# Patient Record
Sex: MALE | Race: WHITE | NOT HISPANIC OR LATINO | Employment: FULL TIME | URBAN - NONMETROPOLITAN AREA
[De-identification: names, ages, dates, MRNs, and addresses within clinical notes are randomized per-mention and may not be internally consistent; named-entity substitution may affect disease eponyms.]

---

## 2017-10-09 ENCOUNTER — OFFICE VISIT (OUTPATIENT)
Dept: FAMILY MEDICINE | Facility: OTHER | Age: 22
End: 2017-10-09
Attending: FAMILY MEDICINE

## 2017-10-09 VITALS
HEART RATE: 88 BPM | SYSTOLIC BLOOD PRESSURE: 108 MMHG | HEIGHT: 66 IN | DIASTOLIC BLOOD PRESSURE: 62 MMHG | BODY MASS INDEX: 21.38 KG/M2 | TEMPERATURE: 99.2 F | WEIGHT: 133 LBS

## 2017-10-09 DIAGNOSIS — J01.01 ACUTE RECURRENT MAXILLARY SINUSITIS: Primary | ICD-10-CM

## 2017-10-09 PROCEDURE — 99213 OFFICE O/P EST LOW 20 MIN: CPT | Performed by: FAMILY MEDICINE

## 2017-10-09 RX ORDER — METHYLPREDNISOLONE 4 MG
TABLET, DOSE PACK ORAL
Qty: 21 TABLET | Refills: 0 | Status: SHIPPED | OUTPATIENT
Start: 2017-10-09 | End: 2018-11-21

## 2017-10-09 ASSESSMENT — ANXIETY QUESTIONNAIRES
1. FEELING NERVOUS, ANXIOUS, OR ON EDGE: NOT AT ALL
4. TROUBLE RELAXING: NOT AT ALL
6. BECOMING EASILY ANNOYED OR IRRITABLE: NOT AT ALL
5. BEING SO RESTLESS THAT IT IS HARD TO SIT STILL: NOT AT ALL
2. NOT BEING ABLE TO STOP OR CONTROL WORRYING: NOT AT ALL
7. FEELING AFRAID AS IF SOMETHING AWFUL MIGHT HAPPEN: NOT AT ALL
3. WORRYING TOO MUCH ABOUT DIFFERENT THINGS: NOT AT ALL
GAD7 TOTAL SCORE: 0

## 2017-10-09 ASSESSMENT — PATIENT HEALTH QUESTIONNAIRE - PHQ9: SUM OF ALL RESPONSES TO PHQ QUESTIONS 1-9: 0

## 2017-10-09 ASSESSMENT — PAIN SCALES - GENERAL: PAINLEVEL: MILD PAIN (3)

## 2017-10-09 NOTE — NURSING NOTE
"Chief Complaint   Patient presents with     Sinus Problem       Initial /62  Pulse 88  Temp 99.2  F (37.3  C)  Ht 5' 6\" (1.676 m)  Wt 133 lb (60.3 kg)  BMI 21.47 kg/m2 Estimated body mass index is 21.47 kg/(m^2) as calculated from the following:    Height as of this encounter: 5' 6\" (1.676 m).    Weight as of this encounter: 133 lb (60.3 kg).  Medication Reconciliation: complete     Song Odom      "

## 2017-10-09 NOTE — MR AVS SNAPSHOT
"              After Visit Summary   10/9/2017    Aaron Conner    MRN: [id number]           Patient Information     Date Of Birth          1995        Visit Information        Provider Department      10/9/2017 2:20 PM Osmel Salas MD [facility]        Today's Diagnoses     Acute recurrent maxillary sinusitis    -  1       Follow-ups after your visit        Who to contact     If you have questions or need follow up information about today's clinic visit or your schedule please contact [facility] directly at [phone].  Normal or non-critical lab and imaging results will be communicated to you by MyChart, letter or phone within 4 business days after the clinic has received the results. If you do not hear from us within 7 days, please contact the clinic through MyChart or phone. If you have a critical or abnormal lab result, we will notify you by phone as soon as possible.  Submit refill requests through [employer] or call your pharmacy and they will forward the refill request to us. Please allow 3 business days for your refill to be completed.          Additional Information About Your Visit        MyChart Information     [employer] lets you send messages to your doctor, view your test results, renew your prescriptions, schedule appointments and more. To sign up, go to [email or web address] . Click on \"Log in\" on the left side of the screen, which will take you to the Welcome page. Then click on \"Sign up Now\" on the right side of the page.     You will be asked to enter the access code listed below, as well as some personal information. Please follow the directions to create your username and password.     Your access code is: [email or web address]  Expires: 2018  2:48 PM     Your access code will  in 90 days. If you need help or a new code, please call your [facility] or [phone].        Care EveryWhere ID     This is your Care EveryWhere ID. This could be used by other " "organizations to access your [place] medical records  [id number]        Your Vitals Were     Pulse Temperature Height BMI (Body Mass Index)          88 99.2  F (37.3  C) 5' 6\" (1.676 m) 21.47 kg/m2         Blood Pressure from Last 3 Encounters:   10/09/17 108/62   10/18/16 118/70   11/16/15 121/61    Weight from Last 3 Encounters:   10/09/17 133 lb (60.3 kg)   10/18/16 131 lb (59.4 kg)   11/16/15 130 lb (59 kg)              Today, you had the following     No orders found for display         Today's Medication Changes          These changes are accurate as of: 10/9/17  2:48 PM.  If you have any questions, ask your nurse or doctor.               Start taking these medicines.        Dose/Directions    amoxicillin-clavulanate 875-125 MG per tablet   Commonly known as:  AUGMENTIN   Used for:  Acute recurrent maxillary sinusitis   Started by:  Osmel Salas MD        Dose:  1 tablet   Take 1 tablet by mouth 2 times daily   Quantity:  20 tablet   Refills:  0         Stop taking these medicines if you haven't already. Please contact your care team if you have questions.     cefPROZIL 500 MG tablet   Commonly known as:  CEFZIL   Stopped by:  Osmel Salas MD                Where to get your medicines      These medications were sent to [address] [address]     Phone:  [phone]     amoxicillin-clavulanate 875-125 MG per tablet    methylPREDNISolone 4 MG tablet                Primary Care Provider Office Phone # Fax #    Osmel Salas [phone]       [facility] [address]  [place] [address]        Equal Access to Services     [facility] TITO AH: Hadii shi Zhou, waaxda luqadaha, qaybta kaalmada adevaldezyada, jean benitez. So [facility] [phone].    ATENCIÓN: Si habla español, tiene a mcgarry disposición servicios gratuitos de asistencia lingüística. Llame al [phone].    We " comply with applicable federal civil rights laws and [place] laws. We do not discriminate on the basis of race, color, national origin, age, disability, sex, sexual orientation, or gender identity.            Thank you!     Thank you for [facility]  for your [facility]. Our goal is always to provide you with excellent care. Hearing back from our patients is one way we can continue to improve our services. Please take a few minutes to complete the written survey that you may receive in the mail after your visit with us. Thank you!             Your Updated Medication List - Protect others around you: Learn how to safely use, store and throw away your medicines at www.disposemymeds.org.          This list is accurate as of: 10/9/17  2:48 PM.  Always use your most recent med list.                   Brand Name Dispense Instructions for use Diagnosis    amoxicillin-clavulanate 875-125 MG per tablet    AUGMENTIN    20 tablet    Take 1 tablet by mouth 2 times daily    Acute recurrent maxillary sinusitis       methylPREDNISolone 4 MG tablet    MEDROL DOSEPAK    21 tablet    Follow package instructions    Acute recurrent maxillary sinusitis

## 2017-10-09 NOTE — PROGRESS NOTES
"  SUBJECTIVE:   Aaron Conner is a 21 year old male who presents to clinic today for the following health issues:      RESPIRATORY SYMPTOMS      Duration: couple weeks    Description  nasal congestion, sore throat, facial pain/pressure, cough, chills, ear pain left, headache, fatigue/malaise, hoarse voice, myalgias and conjunctival irritation    Severity: moderate    Accompanying signs and symptoms: dizziness    History (predisposing factors):  none    Precipitating or alleviating factors: None    Therapies tried and outcome:  rest and fluids nasal spray/wash - affrin      ROS  GEN: NEGATIVE for fever, weight loss, night sweats, recent travel. POSITIVE for warmth and chills and sick contacts.   HEENT: NEGATIVE for postnasal drip, sore throat. POSITIVE for sinus tenderness, cough,   CV: NEGATIVE for chest pain, palpitations.   RESP: NEGATIVE for SOB, pleuritic pain.   GI: NEGATIVE for diarrhea, constipation           Problem list and histories reviewed & adjusted, as indicated.  Additional history: as documented    Labs reviewed in EPIC    Reviewed and updated as needed this visit by clinical staff     Reviewed and updated as needed this visit by Provider         ROS:  C: NEGATIVE for fever, chills, change in weight  R: NEGATIVE for significant cough or SOB    OBJECTIVE:                                                    /62  Pulse 88  Temp 99.2  F (37.3  C)  Ht 5' 6\" (1.676 m)  Wt 133 lb (60.3 kg)  BMI 21.47 kg/m2  Body mass index is 21.47 kg/(m^2).   GENERAL: healthy, alert, well nourished, well hydrated, no distress  HENT: ear canals- normal; TMs- normal; Nose- normal; Mouth- no ulcers, no lesions  Maxillary sinus tenderness  NECK: no tenderness, no adenopathy, no asymmetry, no masses, no stiffness; thyroid- normal to palpation  RESP: lungs clear to auscultation - no rales, no rhonchi, no wheezes         ASSESSMENT/PLAN:                                                    (J01.01) Acute recurrent " maxillary sinusitis  (primary encounter diagnosis)  Comment: will treat.    Plan: amoxicillin-clavulanate (AUGMENTIN) 875-125 MG         per tablet, methylPREDNISolone (MEDROL DOSEPAK)        4 MG tablet        Symptomatic treatment was discussed along when patient should call and/or come back into the clinic or go to ER/Urgent care. All questions answered.   Symptomatic treatment was discussed along what is available for OTC medications for symptomatic relief.             Osmel Salas MD  [facility]

## 2017-10-10 ENCOUNTER — TELEPHONE (OUTPATIENT)
Dept: FAMILY MEDICINE | Facility: OTHER | Age: 22
End: 2017-10-10

## 2017-10-10 ENCOUNTER — OFFICE VISIT (OUTPATIENT)
Dept: FAMILY MEDICINE | Facility: OTHER | Age: 22
End: 2017-10-10
Attending: FAMILY MEDICINE

## 2017-10-10 VITALS
BODY MASS INDEX: 21.38 KG/M2 | SYSTOLIC BLOOD PRESSURE: 112 MMHG | HEIGHT: 66 IN | WEIGHT: 133 LBS | HEART RATE: 100 BPM | TEMPERATURE: 99.2 F | DIASTOLIC BLOOD PRESSURE: 62 MMHG

## 2017-10-10 DIAGNOSIS — B08.4 HAND, FOOT AND MOUTH DISEASE: Primary | ICD-10-CM

## 2017-10-10 PROCEDURE — 99213 OFFICE O/P EST LOW 20 MIN: CPT | Performed by: FAMILY MEDICINE

## 2017-10-10 ASSESSMENT — PAIN SCALES - GENERAL: PAINLEVEL: MILD PAIN (2)

## 2017-10-10 ASSESSMENT — ANXIETY QUESTIONNAIRES: GAD7 TOTAL SCORE: 0

## 2017-10-10 NOTE — PROGRESS NOTES
SUBJECTIVE:   Aaron Conner is a 21 year old male who presents to clinic today for the following health issues:      Rash      Duration: 1 day    Description  Location: bottom of left foot  Itching: no    Intensity:  moderate    Accompanying signs and symptoms: painfull    History (similar episodes/previous evaluation): None    Precipitating or alleviating factors:  New exposures:  medication augmebtin and prednisone  Recent travel: no      Therapies tried and outcome: none    Throat      Duration: blisters and white spots    Description (location/character/radiation): throat    Intensity:  mild    Accompanying signs and symptoms: fatigue    History (similar episodes/previous evaluation): None    Precipitating or alleviating factors: None    Therapies tried and outcome: None     Hot flashes, cold flashes - feeling more weak and run down.   101 last night   Headache with coughing   Took decongestant this morning and slept better.   Started prednisone this morning, this it was after rash.   Taken 2 doses of Augmentin.     Noticed throat spots this am with blister on left tonsil. Pain 4-5/10 when swallowing, throat feels small but the same as yesterday.     Rash this am, purple circles in center of sole, somewhat painful. No itching. Feels like needles with pressure when moving or walking.   Didn't step on anything he knows.   Nothing like this before. No known allergies.       dtr has simialr sx      Problem list and histories reviewed & adjusted, as indicated.  Additional history: as documented        Reviewed and updated as needed this visit by clinical staffTobacco  Allergies  Meds  Med Hx  Surg Hx  Fam Hx  Soc Hx      Reviewed and updated as needed this visit by Provider       ROS:  CONSTITUTIONAL:POSITIVE for chills, fatigue, fever to 101, malaise, myalgias and sweats  INTEGUMENTARY/SKIN: POSITIVE for rash on bottom of left foot. NEGATIVE for other rashes or sores.   ENT/MOUTH: POSITIVE for hoarseness,  "nasal congestion, sinus pressure and sore throat  RESP:POSITIVE for productive cough. NEGATIVE for SOB or difficulty breathing.   CV: NEGATIVE for chest pain, palpitations or peripheral edema  GI: NEGATIVE for nausea, abdominal pain, heartburn, or change in bowel habits    OBJECTIVE:     /62  Pulse 100  Temp 99.2  F (37.3  C)  Ht 5' 6\" (1.676 m)  Wt 133 lb (60.3 kg)  BMI 21.47 kg/m2  Body mass index is 21.47 kg/(m^2).     GENERAL: mild  ill appearing & coughing, alert and no acute distress  HENT: normal cephalic/atraumatic, ear canals and TM's normal, nose and mouth tonsils with  ulcers  On both - very superficial , oral mucous membranes moist, tonsillar erythema   NECK: right adenopathy, no asymmetry, masses, or scars and thyroid normal to palpation  RESP: lungs clear to auscultation - no rales, rhonchi or wheezes  CV: regular rate and rhythm, normal S1 S2, no S3 or S4, no murmur, click or rub, no peripheral edema and peripheral pulses strong  MS: no gross musculoskeletal defects noted, no edema  SKIN: left foot with purple rash tender to the touch, [id number] in center of sole with some blisters and right foot starting to have some blister     Diagnostic Test Results:  none     ASSESSMENT/PLAN:     (B08.4) Hand, foot and mouth disease  (primary encounter diagnosis)  Comment: discussed and hand out given.  Truly feel this is going on. Probably not so much sinus issue that was discussed yesterday.    Plan: Discussed stopping steroids- may immunosuppress but may also help pain in throat. Stop for now.  Motrin and tylenol.  Keeping or stopping abx discussed. Pt wants to stay on it.  Contagiousness and pain control discussed.  Symptomatic treatment was discussed along when patient should call and/or come back into the clinic or go to ER/Urgent care. All questions answered.         See Patient Instructions    Osmel Salas MD  [facility] [place]  "

## 2017-10-10 NOTE — TELEPHONE ENCOUNTER
Has a bumpy painful rash on bottom of left foot, noticed this morning. Also throat looks worse than yesterday has white bumps in back of it and a puss filled bump.

## 2017-10-10 NOTE — PATIENT INSTRUCTIONS
When Your Child Has Hand, Foot, and Mouth Disease  Hand, foot, and mouth disease (HFMD) is a common viral infection in children. It can cause mouth sores and a painless rash on the hands, feet, or buttocks. HFMD can be easily spread from one person to another. It occurs more often in children younger than 10 years old, but anyone can get it. HFMD is often mistaken for strep throat because the symptoms of both conditions are similar. HFMD can cause some discomfort, but it s not a serious problem. Most cases can easily be managed and treated at home.  What causes hand, foot, and mouth disease?  HFMD is usually caused by the coxsackievirus. It can also be caused by other viruses in the same family as coxsackievirus. Your child may have caught HFMD in one of the following ways:    Breathing infected air (the virus can enter the air when an infected person coughs, sneezes, or talks).    Contact with items contaminated with stool from an infected person. Contamination can occur when an infected person doesn t wash his or her hands after having a bowel movement or changing a diaper.    Contact with fluid from the blisters that are part of the rash (this type of transmission is rare).  What are the symptoms of hand, foot, and mouth disease?  Symptoms usually appear 24 to 72 hours after exposure. They include:    Rash (small, red bumps or blisters on the hands, feet, or buttocks)    Mouth sores that often occur on the gums, tongue, inside the cheeks, and in the back of the throat (mouth sores may not occur in some children)    Sore throat    A nonspecific rash over the rest of the body    Fever    Loss of appetite    Pain when swallowing    Drooling  How is hand, foot, and mouth disease diagnosed?  HFMD is diagnosed by how the rash and mouth sores look. To get more information, the healthcare provider will ask about your child s symptoms and health history. He or she will also examine your child. You will be told if any  tests are needed to rule out other infections.  How is hand, foot, and mouth disease treated?  There is no specific treatment for HFMD, but there are things you can do at home to help relieve some symptoms. The illness generally lasts about 7 to 10 days. Your child is no longer contagious 24 hours after the fever is gone.  Mouth pain    Unless your child s healthcare provider has prescribed another medicine for mouth pain, give your child ibuprofen or acetaminophen to treat pain or discomfort. Talk with your child's provider about dosing instructions and when to give the medicine (schedule). Do not give ibuprofen to an infant age 6 months or younger. Do not give aspirin to a child with a fever. This can put your child at risk of a serious illness called Reye syndrome.    Liquid antacid can be used 4 times per day to coat the mouth sores for pain relief. Talk with your child's provider about how much and when to give the medicine to your child:    Children over age 4 can use 1 teaspoon (5ml) as a mouth rinse after meals.    For children under age 4, a parent can place 1/2 teaspoon (2.5ml) in the front of the mouth after meals. Avoid regular mouth rinses because they may sting.  Diet    Follow a soft diet with plenty of fluids to prevent fluid loss (dehydration). If your child doesn't want to eat solid foods, it's OK for a few days, as long as he or she drinks plenty of fluids.    Cool drinks and frozen treats (such as sherbet) are soothing and easier to take.    Avoid citrus juices (such as orange juice or lemonade) and salty or spicy foods. These may cause more pain in the mouth sores.  When to seek medical care  Call the child's provider if your otherwise healthy child has any of the following:    A mouth sore that doesn t go away within 14 days    Increased mouth pain    Trouble swallowing    Neck pain    Chest pain    Trouble breathing    Weakness    Lack of energy    Signs of infection around the rash or mouth  sores (pus, drainage, or swelling)    Signs of dehydration (very dark or little urine, excessive thirst, dry mouth, dizziness)    A fever ((see fever and children section below)    A seizure  Fever and children  Always use a digital thermometer when checking your child s temperature. Never use mercury thermometers.  For infants and toddlers, be sure to use a rectal thermometer correctly. A rectal thermometer may accidentally poke a hole in (perforate) the rectum. It may also pass on germs from the stool. Always follow the product maker s instructions for proper use. If you don t feel comfortable taking a rectal temperature, use a different method. When you talk to your child s healthcare provider, tell him or her which type of method you used to take your child s temperature.  Here are guidelines for fever temperature. Ear temperatures aren t accurate before 6 months of age. Don t take an oral temperature until your child is at least 4 years old.  Infant under 3 months old:    Ask your child s healthcare provider how you should take the temperature.    Rectal or forehead (temporal artery) temperature of 100.4 F (38 C) or higher, or as directed by the provider.    Armpit (axillary) temperature of 99 F (37.2 C) or higher, or as directed by the provider.  Child age 3 to 36 months:    Rectal, forehead (temporal artery), or ear temperature of 102 F (38.9 C) or higher, or as directed by the provider.    Armpit temperature of 101 F (38.3 C) or higher, or as directed by the provider.  Child of any age:    Repeated temperature of 104 F (40 C) or higher, or as directed by the provider.    Fever that lasts more than 24 hours in a child under 2 years old, or for 3 days in a child 2 years or older.   How can hand, foot, and mouth disease be prevented?    Follow these steps to keep your child from passing HFMD on to others:    Teach your child to wash his or her hands with soap and warm water often. Handwashing is especially  important before eating or handling food, after using the bathroom, and after touching the rash. A child is very contagious during the first week of the illness and he or she can still be contagious for days to weeks after the illness resolves.    Your child should remain at home while he or she is sick with hand, foot, and mouth disease. Discuss with your child's health care provider how long you should keep your child from attending school or  or playing with others.    Do not allow your child to share cups, utensils, napkins, or personal items such as towels and toothbrushes with others.  Date Last Reviewed: 1/1/2017 2000-2017 [employer]. [address] [place] [address], [place] [address]. All rights reserved. This information is not intended as a substitute for professional medical care. Always follow your healthcare professional's instructions.        Hand, Foot & Mouth Disease (Child)    Hand, foot, and mouth disease (HFMD) is an illness caused by a virus. It is usually seen in infant and children younger than 10 years of age, but can occur in adults. This virus causes small ulcers in the mouth (throat, lips, cheeks, gums, and tongue) and small blisters or red spots may appear on the palms (hands), diaper area, and soles of the feet. There is usually a low-grade fever and poor appetite. HFMD is not a serious illness and usually go away in 1 to 2 weeks. The painful sores in the mouth may prevent your child from taking oral fluids well and result in dehydration.  It takes 3 to 5 days for the illness to appear in an exposed child. Generally, the HFMD is the most contagious during the first week of the illness. Sometimes, people can be contagious for days or weeks after the symptoms have disappeared. Adults who get infected with the HFMD may not have symptoms and may still be contagious.  HFMD can be transmitted from person to person by:    Touching your nose, mouth, eye after touching the stool of  an infected person (has the virus)    Touching your nose, mouth, eye after touching fluid from the blisters/sores of an infected person    Respiratory secretions (sneezing, coughing, blowing your nose)    Touching contaminated objects (toys, doorknobs)    Oral secretions (kissing)  Home care  Mouth pain  Unless your doctor has prescribed another medicine for mouth pain:    Acetaminophen or ibuprofen may be used for pain or discomfort. Please consult your child's doctor before giving your child acetaminophen or ibuprofen for dosing instructions and when to give the medicine (schedule).  Do not give ibuprofen to an infant 6 months of age or younger. Talk to your child's doctor before giving him or her over-the counter medicines.    Liquid antacid can be used 4 times per day to coat the mouth sores for pain relief.  Follow these instructions or do as directed by your child's doctor.    Children over age 4 can use 1 teaspoon (5 ml)  as a mouth rinse after meals.    For children under age 4, a parent can place 1/2 teaspoon (2.5 ml)  in the front of the mouth after meals.  Avoid regular mouth rinses because they may sting.  Feeding  Follow a soft diet with plenty of fluids to prevent dehydration. If your child doesn't want to eat solid foods, it's OK for a few days, as long as he or she drinks lots of fluid. Cool drinks and frozen treats (sherbet) are soothing and easier to take. Avoid citrus juices (orange juice, lemonade, etc.) and salty or spicy foods. These may cause more pain in the mouth sores.  Fever  You may use acetaminophen or ibuprofen for fever, as directed by your child's doctor. Talk to your child's doctor for dosing instructions and schedule. Do not give ibuprofen to an infant 6 months of age or younger. If your child has chronic liver or kidney disease or ever had a stomach ulcer or GI bleeding, talk with your doctor before using these medicines.  Aspirin should never be used in anyone under 18 years of  age who is ill with a fever. It may cause severe disease (Reye Syndrome) or death.  Isolation  Children may return to day care or school once the fever is gone and they are eating and drinking well. Contact your healthcare provider and ask when your child (or you) is able to return to school (or work).  Follow up  Follow up with your doctor as directed by our staff.  When to seek medical care  Call your child's healthcare provider right away if any of these occur:    Your child complains of neck or chest pain    Your child is having trouble breathing and lethargic    Your child is having trouble swallowing    Mouth ulcers are present after 2 weeks    Your child's condition is worse    Your child appear to be dehydrated (dry mouth, no tears, haven' t urinated is 8 or more hours)    Fever of 100.4 F (38 C) or higher, not better with fever medicine    Your child has repeated fevers above 104 F (40 C)    Your child is younger than 2 years old and their fever continues for more than 24 hours    Your child is 2 years old and older and their fever continues for more than 3 days  When to call 911  When to call 911 or seek medical care immediately :    Unusual fussiness, drowsiness or confusion    Dark purple rash    Trouble breathing    Seizure  Date Last Reviewed: 8/13/2015 2000-2017 [employer] [address] [place] [address], [place] [address]. All rights reserved. This information is not intended as a substitute for professional medical care. Always follow your healthcare professional's instructions.

## 2017-10-10 NOTE — TELEPHONE ENCOUNTER
"9:07 AM    Reason for Call: Phone Call    Description: Patient was seen yesterday for sinus infection and was given an antibiotic. This morning patient stated \" I have sores in the back of my throat and I would like to make sure that the antibiotic I was given will treat this?\"    Was an appointment offered for this call? No  If yes : Appointment type              Date    Preferred method for responding to this message: Telephone Call  What is your phone number ?    If we cannot reach you directly, may we leave a detailed response at the number you provided? Yes    Can this message wait until your PCP/provider returns, if available today? Not applicable,     Viola Brown"

## 2017-10-10 NOTE — MR AVS SNAPSHOT
After Visit Summary   10/10/2017    Aaron Conner    MRN: [id number]           Patient Information     Date Of Birth          1995        Visit Information        Provider Department      10/10/2017 10:40 AM Osmel Salas MD [facility]        Today's Diagnoses     Hand, foot and mouth disease    -  1      Care Instructions      When Your Child Has Hand, Foot, and Mouth Disease  Hand, foot, and mouth disease (HFMD) is a common viral infection in children. It can cause mouth sores and a painless rash on the hands, feet, or buttocks. HFMD can be easily spread from one person to another. It occurs more often in children younger than 10 years old, but anyone can get it. HFMD is often mistaken for strep throat because the symptoms of both conditions are similar. HFMD can cause some discomfort, but it s not a serious problem. Most cases can easily be managed and treated at home.  What causes hand, foot, and mouth disease?  HFMD is usually caused by the coxsackievirus. It can also be caused by other viruses in the same family as coxsackievirus. Your child may have caught HFMD in one of the following ways:    Breathing infected air (the virus can enter the air when an infected person coughs, sneezes, or talks).    Contact with items contaminated with stool from an infected person. Contamination can occur when an infected person doesn t wash his or her hands after having a bowel movement or changing a diaper.    Contact with fluid from the blisters that are part of the rash (this type of transmission is rare).  What are the symptoms of hand, foot, and mouth disease?  Symptoms usually appear 24 to 72 hours after exposure. They include:    Rash (small, red bumps or blisters on the hands, feet, or buttocks)    Mouth sores that often occur on the gums, tongue, inside the cheeks, and in the back of the throat (mouth sores may not occur in some children)    Sore throat    A nonspecific rash over  the rest of the body    Fever    Loss of appetite    Pain when swallowing    Drooling  How is hand, foot, and mouth disease diagnosed?  HFMD is diagnosed by how the rash and mouth sores look. To get more information, the healthcare provider will ask about your child s symptoms and health history. He or she will also examine your child. You will be told if any tests are needed to rule out other infections.  How is hand, foot, and mouth disease treated?  There is no specific treatment for HFMD, but there are things you can do at home to help relieve some symptoms. The illness generally lasts about 7 to 10 days. Your child is no longer contagious 24 hours after the fever is gone.  Mouth pain    Unless your child s healthcare provider has prescribed another medicine for mouth pain, give your child ibuprofen or acetaminophen to treat pain or discomfort. Talk with your child's provider about dosing instructions and when to give the medicine (schedule). Do not give ibuprofen to an infant age 6 months or younger. Do not give aspirin to a child with a fever. This can put your child at risk of a serious illness called Reye syndrome.    Liquid antacid can be used 4 times per day to coat the mouth sores for pain relief. Talk with your child's provider about how much and when to give the medicine to your child:    Children over age 4 can use 1 teaspoon (5ml) as a mouth rinse after meals.    For children under age 4, a parent can place 1/2 teaspoon (2.5ml) in the front of the mouth after meals. Avoid regular mouth rinses because they may sting.  Diet    Follow a soft diet with plenty of fluids to prevent fluid loss (dehydration). If your child doesn't want to eat solid foods, it's OK for a few days, as long as he or she drinks plenty of fluids.    Cool drinks and frozen treats (such as sherbet) are soothing and easier to take.    Avoid citrus juices (such as orange juice or lemonade) and salty or spicy foods. These may cause more  pain in the mouth sores.  When to seek medical care  Call the child's provider if your otherwise healthy child has any of the following:    A mouth sore that doesn t go away within 14 days    Increased mouth pain    Trouble swallowing    Neck pain    Chest pain    Trouble breathing    Weakness    Lack of energy    Signs of infection around the rash or mouth sores (pus, drainage, or swelling)    Signs of dehydration (very dark or little urine, excessive thirst, dry mouth, dizziness)    A fever ((see fever and children section below)    A seizure  Fever and children  Always use a digital thermometer when checking your child s temperature. Never use mercury thermometers.  For infants and toddlers, be sure to use a rectal thermometer correctly. A rectal thermometer may accidentally poke a hole in (perforate) the rectum. It may also pass on germs from the stool. Always follow the product maker s instructions for proper use. If you don t feel comfortable taking a rectal temperature, use a different method. When you talk to your child s healthcare provider, tell him or her which type of method you used to take your child s temperature.  Here are guidelines for fever temperature. Ear temperatures aren t accurate before 6 months of age. Don t take an oral temperature until your child is at least 4 years old.  Infant under 3 months old:    Ask your child s healthcare provider how you should take the temperature.    Rectal or forehead (temporal artery) temperature of 100.4 F (38 C) or higher, or as directed by the provider.    Armpit (axillary) temperature of 99 F (37.2 C) or higher, or as directed by the provider.  Child age 3 to 36 months:    Rectal, forehead (temporal artery), or ear temperature of 102 F (38.9 C) or higher, or as directed by the provider.    Armpit temperature of 101 F (38.3 C) or higher, or as directed by the provider.  Child of any age:    Repeated temperature of 104 F (40 C) or higher, or as directed by  the provider.    Fever that lasts more than 24 hours in a child under 2 years old, or for 3 days in a child 2 years or older.   How can hand, foot, and mouth disease be prevented?    Follow these steps to keep your child from passing HFMD on to others:    Teach your child to wash his or her hands with soap and warm water often. Handwashing is especially important before eating or handling food, after using the bathroom, and after touching the rash. A child is very contagious during the first week of the illness and he or she can still be contagious for days to weeks after the illness resolves.    Your child should remain at home while he or she is sick with hand, foot, and mouth disease. Discuss with your child's health care provider how long you should keep your child from attending school or  or playing with others.    Do not allow your child to share cups, utensils, napkins, or personal items such as towels and toothbrushes with others.  Date Last Reviewed: 1/1/2017 2000-2017 [employer]. [address] [place] [address]. All rights reserved. This information is not intended as a substitute for professional medical care. Always follow your healthcare professional's instructions.        Hand, Foot & Mouth Disease (Child)    Hand, foot, and mouth disease (HFMD) is an illness caused by a virus. It is usually seen in infant and children younger than 10 years of age, but can occur in adults. This virus causes small ulcers in the mouth (throat, lips, cheeks, gums, and tongue) and small blisters or red spots may appear on the palms (hands), diaper area, and soles of the feet. There is usually a low-grade fever and poor appetite. HFMD is not a serious illness and usually go away in 1 to 2 weeks. The painful sores in the mouth may prevent your child from taking oral fluids well and result in dehydration.  It takes 3 to 5 days for the illness to appear in an exposed child. Generally, the  HFMD is the most contagious during the first week of the illness. Sometimes, people can be contagious for days or weeks after the symptoms have disappeared. Adults who get infected with the HFMD may not have symptoms and may still be contagious.  HFMD can be transmitted from person to person by:    Touching your nose, mouth, eye after touching the stool of an infected person (has the virus)    Touching your nose, mouth, eye after touching fluid from the blisters/sores of an infected person    Respiratory secretions (sneezing, coughing, blowing your nose)    Touching contaminated objects (toys, doorknobs)    Oral secretions (kissing)  Home care  Mouth pain  Unless your doctor has prescribed another medicine for mouth pain:    Acetaminophen or ibuprofen may be used for pain or discomfort. Please consult your child's doctor before giving your child acetaminophen or ibuprofen for dosing instructions and when to give the medicine (schedule).  Do not give ibuprofen to an infant 6 months of age or younger. Talk to your child's doctor before giving him or her over-the counter medicines.    Liquid antacid can be used 4 times per day to coat the mouth sores for pain relief.  Follow these instructions or do as directed by your child's doctor.    Children over age 4 can use 1 teaspoon (5 ml)  as a mouth rinse after meals.    For children under age 4, a parent can place 1/2 teaspoon (2.5 ml)  in the front of the mouth after meals.  Avoid regular mouth rinses because they may sting.  Feeding  Follow a soft diet with plenty of fluids to prevent dehydration. If your child doesn't want to eat solid foods, it's OK for a few days, as long as he or she drinks lots of fluid. Cool drinks and frozen treats (sherbet) are soothing and easier to take. Avoid citrus juices (orange juice, lemonade, etc.) and salty or spicy foods. These may cause more pain in the mouth sores.  Fever  You may use acetaminophen or ibuprofen for fever, as directed  by your child's doctor. Talk to your child's doctor for dosing instructions and schedule. Do not give ibuprofen to an infant 6 months of age or younger. If your child has chronic liver or kidney disease or ever had a stomach ulcer or GI bleeding, talk with your doctor before using these medicines.  Aspirin should never be used in anyone under 18 years of age who is ill with a fever. It may cause severe disease (Reye Syndrome) or death.  Isolation  Children may return to day care or school once the fever is gone and they are eating and drinking well. Contact your healthcare provider and ask when your child (or you) is able to return to school (or work).  Follow up  Follow up with your doctor as directed by our staff.  When to seek medical care  Call your child's healthcare provider right away if any of these occur:    Your child complains of neck or chest pain    Your child is having trouble breathing and lethargic    Your child is having trouble swallowing    Mouth ulcers are present after 2 weeks    Your child's condition is worse    Your child appear to be dehydrated (dry mouth, no tears, haven' t urinated is 8 or more hours)    Fever of 100.4 F (38 C) or higher, not better with fever medicine    Your child has repeated fevers above 104 F (40 C)    Your child is younger than 2 years old and their fever continues for more than 24 hours    Your child is 2 years old and older and their fever continues for more than 3 days  When to call 911  When to call 911 or seek medical care immediately :    Unusual fussiness, drowsiness or confusion    Dark purple rash    Trouble breathing    Seizure  Date Last Reviewed: 8/13/2015 2000-2017 [employer]. [address] [place] [address], [place] [address]. All rights reserved. This information is not intended as a substitute for professional medical care. Always follow your healthcare professional's instructions.                Follow-ups after your visit        Who to  "contact     If you have questions or need follow up information about today's clinic visit or your schedule please contact [facility] directly at [phone].  Normal or non-critical lab and imaging results will be communicated to you by MyChart, letter or phone within 4 business days after the clinic has received the results. If you do not hear from us within 7 days, please contact the clinic through MyChart or phone. If you have a critical or abnormal lab result, we will notify you by phone as soon as possible.  Submit refill requests through [employer] or call your pharmacy and they will forward the refill request to us. Please allow 3 business days for your refill to be completed.          Additional Information About Your Visit        [employer] Information     [employer] lets you send messages to your doctor, view your test results, renew your prescriptions, schedule appointments and more. To sign up, go to [email or web address] . Click on \"Log in\" on the left side of the screen, which will take you to the Welcome page. Then click on \"Sign up Now\" on the right side of the page.     You will be asked to enter the access code listed below, as well as some personal information. Please follow the directions to create your username and password.     Your access code is: [email or web address]  Expires: 2018  2:48 PM     Your access code will  in 90 days. If you need help or a new code, please call your [facility] clinic or [phone].        Care EveryWhere ID     This is your Care EveryWhere ID. This could be used by other organizations to access your [facility] medical records  [id number]        Your Vitals Were     Pulse Temperature Height BMI (Body Mass Index)          100 99.2  F (37.3  C) 5' 6\" (1.676 m) 21.47 kg/m2         Blood Pressure from Last 3 Encounters:   10/10/17 112/62   10/09/17 108/62   10/18/16 118/70    Weight from Last 3 Encounters:   10/10/17 133 lb (60.3 kg)   10/09/17 133 lb (60.3 " kg)   10/18/16 131 lb (59.4 kg)              Today, you had the following     No orders found for display       Primary Care Provider Office Phone # Fax #    Osmel Salas [phone]       [facility] [address] [place] [address]        Equal Access to Services     [facility] TITO : Hadii aad ku hadasho Soomaali, waaxda luqadaha, qaybta kaalmada adeegyada, waxay idiin hayaan adeeg khradhash lakatherinn . [facility] [phone].    ATENCIÓN: Si habla español, tiene a mcgarry disposición servicios gratuitos de asistencia lingüística. Llame al [phone].    We comply with applicable federal civil rights laws and [place] laws. We do not discriminate on the basis of race, color, national origin, age, disability, sex, sexual orientation, or gender identity.            Thank you!     Thank you for choosing [facility]  for your care. Our goal is always to provide you with excellent care. Hearing back from our patients is one way we can continue to improve our services. Please take a few minutes to complete the written survey that you may receive in the mail after your visit with us. Thank you!             Your Updated Medication List - Protect others around you: Learn how to safely use, store and throw away your medicines at www.disposemymeds.org.          This list is accurate as of: 10/10/17 11:23 AM.  Always use your most recent med list.                   Brand Name Dispense Instructions for use Diagnosis    amoxicillin-clavulanate 875-125 MG per tablet    AUGMENTIN    20 tablet    Take 1 tablet by mouth 2 times daily    Acute recurrent maxillary sinusitis       methylPREDNISolone 4 MG tablet    MEDROL DOSEPAK    21 tablet    Follow package instructions    Acute recurrent maxillary sinusitis

## 2017-10-11 ENCOUNTER — TELEPHONE (OUTPATIENT)
Dept: FAMILY MEDICINE | Facility: OTHER | Age: 22
End: 2017-10-11

## 2017-10-11 NOTE — TELEPHONE ENCOUNTER
10:20 AM    Reason for Call: Phone Call    Description: Pt called and stated he has a few questions regarding his diagnosis from yesterday. Please call him back at [phone]    Was an appointment offered for this call? No  If yes : Appointment type              Date    Preferred method for responding to this message: Telephone Call  What is your phone number ?    If we cannot reach you directly, may we leave a detailed response at the number you provided? Yes    Can this message wait until your PCP/provider returns, if available today? Not applicable    Chelsey Landers

## 2017-10-12 ENCOUNTER — TELEPHONE (OUTPATIENT)
Dept: FAMILY MEDICINE | Facility: OTHER | Age: 22
End: 2017-10-12

## 2017-10-12 NOTE — TELEPHONE ENCOUNTER
11:14 AM    Reason for Call: Phone Call    Description: NAHOMI  swapna would like a call back/ personal.    Was an appointment offered for this call? Yes  If yes : Appointment type              Date    Preferred method for responding to this message: Telephone Call  What is your phone number ? [phone]    If we cannot reach you directly, may we leave a detailed response at the number you provided? Yes    Can this message wait until your PCP/provider returns, if available today? adalberto Grey

## 2017-10-12 NOTE — TELEPHONE ENCOUNTER
Patient notified via phone. States he has been taking ibuprofen 800 mg not helping. Told patient MD not available this afternoon, if feels needs to be seen today to go into urgent care.

## 2017-10-12 NOTE — TELEPHONE ENCOUNTER
1:05 PM    Reason for Call: Phone Call    Description:   Patient called back , advised him of what Dr. Salas said, and he said that is what he has been doing, he says on a scale of 1 to 10 his pain is a 12 Please call him back      Feli Grey

## 2017-10-12 NOTE — TELEPHONE ENCOUNTER
Recommend 1000mg Tylenol alternating with 800mg Motrin every 4 hrs.  So can take tylenol every 8 hrs and Motrin every 8 hrs just alterate times.    Chloroseptic spray for mouth.  Push liquids to prevent dehydration.  Call tomorrow afternoon on how above is working .  Few other options available.

## 2017-10-12 NOTE — TELEPHONE ENCOUNTER
Calling about blisters becoming more wide spread, on both feet, hands and face, painful. Patient is wondering if there is anything to make it better?

## 2017-11-24 ENCOUNTER — OFFICE VISIT (OUTPATIENT)
Dept: FAMILY MEDICINE | Facility: OTHER | Age: 22
End: 2017-11-24
Attending: FAMILY MEDICINE

## 2017-11-24 VITALS
WEIGHT: 125 LBS | OXYGEN SATURATION: 98 % | DIASTOLIC BLOOD PRESSURE: 70 MMHG | BODY MASS INDEX: 20.09 KG/M2 | HEIGHT: 66 IN | SYSTOLIC BLOOD PRESSURE: 115 MMHG | HEART RATE: 111 BPM | TEMPERATURE: 98.3 F

## 2017-11-24 DIAGNOSIS — J01.01 ACUTE RECURRENT MAXILLARY SINUSITIS: Primary | ICD-10-CM

## 2017-11-24 PROCEDURE — 99213 OFFICE O/P EST LOW 20 MIN: CPT | Performed by: FAMILY MEDICINE

## 2017-11-24 ASSESSMENT — ANXIETY QUESTIONNAIRES
3. WORRYING TOO MUCH ABOUT DIFFERENT THINGS: NOT AT ALL
IF YOU CHECKED OFF ANY PROBLEMS ON THIS QUESTIONNAIRE, HOW DIFFICULT HAVE THESE PROBLEMS MADE IT FOR YOU TO DO YOUR WORK, TAKE CARE OF THINGS AT HOME, OR GET ALONG WITH OTHER PEOPLE: NOT DIFFICULT AT ALL
6. BECOMING EASILY ANNOYED OR IRRITABLE: NOT AT ALL
GAD7 TOTAL SCORE: 0
1. FEELING NERVOUS, ANXIOUS, OR ON EDGE: NOT AT ALL
7. FEELING AFRAID AS IF SOMETHING AWFUL MIGHT HAPPEN: NOT AT ALL
4. TROUBLE RELAXING: NOT AT ALL
2. NOT BEING ABLE TO STOP OR CONTROL WORRYING: NOT AT ALL
5. BEING SO RESTLESS THAT IT IS HARD TO SIT STILL: NOT AT ALL

## 2017-11-24 ASSESSMENT — PAIN SCALES - GENERAL: PAINLEVEL: MODERATE PAIN (5)

## 2017-11-24 NOTE — PROGRESS NOTES
"  SUBJECTIVE:   Aaron Conner is a 21 year old male who presents to clinic today for the following health issues:      ENT Symptoms             Symptoms: cc Present Absent Comment   Fever/Chills  yes  chillls   Fatigue  yes     Muscle Aches       Eye Irritation  yes  Flashes of light   Sneezing  no     Nasal Benjie/Drg  yes  Yellow drainage   Sinus Pressure/Pain  yes  Pressure in back of the head & forehead   Loss of smell  yes  briefly   Dental pain  no     Sore Throat   no    Swollen Glands   no    Ear Pain/Fullness  yes     Cough  yes     Wheeze   no    Chest Pain   no    Shortness of breath  yes     Rash   No     Other  Yes   Constant headache      Symptom duration: week   Symptom severity:  moderate   Treatments tried: On amoxicillin but didn't finish it- for Sinus Infection   Contacts:       Last notes reviewed  Last week has increase sinus pain and pressure.   Has h/o chronic sinus issue in past and s/p sinus surgery           Problem list and histories reviewed & adjusted, as indicated.  Additional history: as documented    Labs reviewed in EPIC    Reviewed and updated as needed this visit by clinical staffTobacco  Allergies  Meds  Med Hx  Surg Hx  Fam Hx  Soc Hx      Reviewed and updated as needed this visit by Provider         ROS:  C: NEGATIVE for fever, chills, change in weight  R: NEGATIVE for significant cough or SOB    OBJECTIVE:                                                    /70  Pulse 111  Temp 98.3  F (36.8  C) (Tympanic)  Ht 5' 6\" (1.676 m)  Wt 125 lb (56.7 kg)  SpO2 98%  BMI 20.18 kg/m2  Body mass index is 20.18 kg/(m^2).   GENERAL: healthy, alert, well nourished, well hydrated, no distress  HENT: ear canals- normal; TMs- normal; Nose- normal; Mouth- no ulcers, no lesions  Sinus tenderness over maxillary   NECK: no tenderness, no adenopathy, no asymmetry, no masses, no stiffness; thyroid- normal to palpation  RESP: lungs clear to auscultation - no rales, no rhonchi, no " wheezes         ASSESSMENT/PLAN:                                                    (J01.01) Acute recurrent maxillary sinusitis  (primary encounter diagnosis)  Comment: will treat and needs to finish all meds. Did not finish augmentin and did not use steriod pack last time  Plan: amoxicillin-clavulanate (AUGMENTIN) 875-125 MG         per tablet        Symptomatic treatment was discussed along when patient should call and/or come back into the clinic or go to ER/Urgent care. All questions answered.   If not getting better - consider CT has h/o sinus cyst.         See Patient Instructions    Osmel Salas MD  [facility]

## 2017-11-24 NOTE — MR AVS SNAPSHOT
"              After Visit Summary   11/24/2017    Aaron Conner    MRN: [id number]           Patient Information     Date Of Birth          1995        Visit Information        Provider Department      11/24/2017 2:30 PM Osmel Salas MD [facility]        Today's Diagnoses     Acute recurrent maxillary sinusitis    -  1       Follow-ups after your visit        Your next 10 appointments already scheduled     Dec 05, 2017  8:45 AM CST   (Arrive by 8:30 AM)   New Visit with Jessica Ramsay PA-C   [facility] [place] ([facility] [place] )    [address]  [place] [address]   [phone]              Who to contact     If you have questions or need follow up information about today's clinic visit or your schedule please contact [facility] directly at [phone].  Normal or non-critical lab and imaging results will be communicated to you by MyChart, letter or phone within 4 business days after the clinic has received the results. If you do not hear from us within 7 days, please contact the clinic through MyChart or phone. If you have a critical or abnormal lab result, we will notify you by phone as soon as possible.  Submit refill requests through [employer] or call your pharmacy and they will forward the refill request to us. Please allow 3 business days for your refill to be completed.          Additional Information About Your Visit        MyChart Information     [employer] lets you send messages to your doctor, view your test results, renew your prescriptions, schedule appointments and more. To sign up, go to [email or web address] . Click on \"Log in\" on the left side of the screen, which will take you to the Welcome page. Then click on \"Sign up Now\" on the right side of the page.     You will be asked to enter the access code listed below, as well as some personal information. Please follow the directions to create your username and password.     Your access code " "is: [email or web address]  Expires: 2018  1:48 PM     Your access code will  in 90 days. If you need help or a new code, please call your [facility] clinic or [phone].        Care EveryWhere ID     This is your Care EveryWhere ID. This could be used by other organizations to access your [facility] medical records  [id number]        Your Vitals Were     Pulse Temperature Height Pulse Oximetry BMI (Body Mass Index)       111 98.3  F (36.8  C) (Tympanic) 5' 6\" (1.676 m) 98% 20.18 kg/m2        Blood Pressure from Last 3 Encounters:   17 115/70   10/10/17 112/62   10/09/17 108/62    Weight from Last 3 Encounters:   17 125 lb (56.7 kg)   10/10/17 133 lb (60.3 kg)   10/09/17 133 lb (60.3 kg)              Today, you had the following     No orders found for display         Today's Medication Changes          These changes are accurate as of: 17  3:16 PM.  If you have any questions, ask your nurse or doctor.               These medicines have changed or have updated prescriptions.        Dose/Directions    * amoxicillin-clavulanate 875-125 MG per tablet   Commonly known as:  AUGMENTIN   This may have changed:  Another medication with the same name was added. Make sure you understand how and when to take each.   Used for:  Acute recurrent maxillary sinusitis   Changed by:  Osmel Salas MD        Dose:  1 tablet   Take 1 tablet by mouth 2 times daily   Quantity:  20 tablet   Refills:  0       * amoxicillin-clavulanate 875-125 MG per tablet   Commonly known as:  AUGMENTIN   This may have changed:  You were already taking a medication with the same name, and this prescription was added. Make sure you understand how and when to take each.   Used for:  Acute recurrent maxillary sinusitis   Changed by:  Osmel Salas MD        Dose:  1 tablet   Take 1 tablet by mouth 2 times daily   Quantity:  20 tablet   Refills:  0       * Notice:  This list has 2 medication(s) that are the same as other medications " prescribed for you. Read the directions carefully, and ask your doctor or other care provider to review them with you.         Where to get your medicines      These medications were sent to [facility] - [place], [place] - [address] [facility]  [address], [place] [address]     Phone:  [phone]     amoxicillin-clavulanate 875-125 MG per tablet                Primary Care Provider Office Phone # Fax #    Osmel Salas [phone]       [facility] [address] [address]        Equal Access to Services     [facility]: Hadii aad ku hadasho Soomaali, waaxda luqadaha, qaybta kaalmada adeegyada, jean gusman . So [facility] [phone].    ATENCIÓN: Si habla español, tiene a mcgarry disposición servicios gratuitos de asistencia lingüística. [facility] [phone].    We comply with applicable federal civil rights laws and [place] laws. We do not discriminate on the basis of race, color, national origin, age, disability, sex, sexual orientation, or gender identity.            Thank you!     Thank you for choosing [facility]  for your care. Our goal is always to provide you with excellent care. Hearing back from our patients is one way we can continue to improve our services. Please take a few minutes to complete the written survey that you may receive in the mail after your visit with us. Thank you!             Your Updated Medication List - Protect others around you: Learn how to safely use, store and throw away your medicines at www.disposemymeds.org.          This list is accurate as of: 11/24/17  3:16 PM.  Always use your most recent med list.                   Brand Name Dispense Instructions for use Diagnosis    * amoxicillin-clavulanate 875-125 MG per tablet    AUGMENTIN    20 tablet    Take 1 tablet by mouth 2 times daily    Acute recurrent maxillary sinusitis       * amoxicillin-clavulanate 875-125 MG per tablet    AUGMENTIN    20  tablet    Take 1 tablet by mouth 2 times daily    Acute recurrent maxillary sinusitis       methylPREDNISolone 4 MG tablet    MEDROL DOSEPAK    21 tablet    Follow package instructions    Acute recurrent maxillary sinusitis       * Notice:  This list has 2 medication(s) that are the same as other medications prescribed for you. Read the directions carefully, and ask your doctor or other care provider to review them with you.

## 2017-11-24 NOTE — NURSING NOTE
"Chief Complaint   Patient presents with     Headache       Initial /70  Pulse 111  Temp 98.3  F (36.8  C) (Tympanic)  Ht 5' 6\" (1.676 m)  Wt 125 lb (56.7 kg)  SpO2 98%  BMI 20.18 kg/m2 Estimated body mass index is 20.18 kg/(m^2) as calculated from the following:    Height as of this encounter: 5' 6\" (1.676 m).    Weight as of this encounter: 125 lb (56.7 kg).  Medication Reconciliation: complete   Chelsey Peres    "

## 2017-11-25 ASSESSMENT — ANXIETY QUESTIONNAIRES: GAD7 TOTAL SCORE: 0

## 2017-11-27 ASSESSMENT — ANXIETY QUESTIONNAIRES
3. WORRYING TOO MUCH ABOUT DIFFERENT THINGS: NOT AT ALL
5. BEING SO RESTLESS THAT IT IS HARD TO SIT STILL: NOT AT ALL
1. FEELING NERVOUS, ANXIOUS, OR ON EDGE: NOT AT ALL
GAD7 TOTAL SCORE: 0
4. TROUBLE RELAXING: NOT AT ALL
6. BECOMING EASILY ANNOYED OR IRRITABLE: NOT AT ALL
2. NOT BEING ABLE TO STOP OR CONTROL WORRYING: NOT AT ALL
7. FEELING AFRAID AS IF SOMETHING AWFUL MIGHT HAPPEN: NOT AT ALL

## 2017-11-27 ASSESSMENT — PATIENT HEALTH QUESTIONNAIRE - PHQ9: SUM OF ALL RESPONSES TO PHQ QUESTIONS 1-9: 6

## 2017-12-01 ENCOUNTER — TELEPHONE (OUTPATIENT)
Dept: FAMILY MEDICINE | Facility: OTHER | Age: 22
End: 2017-12-01

## 2017-12-01 NOTE — TELEPHONE ENCOUNTER
Normal to cough up dark yellow brownish phlegm after quitting smoking. If developes any other symptoms needs to come in an bee seen.

## 2017-12-01 NOTE — TELEPHONE ENCOUNTER
11:46 AM    Reason for Call: Phone Call    Description: Pt would like a call  Back states he quit smoking about 2 weeks ago and he  is coughing up Black flem and wants to know if this is normal     Was an appointment offered for this call? No  If yes : Appointment type              Date    Preferred method for responding to this message: Telephone Call  What is your phone number ? [phone]    If we cannot reach you directly, may we leave a detailed response at the number you provided? Yes    Can this message wait until your PCP/provider returns, if available today? Not applicable, provider is in     Fabi Cosme

## 2018-07-28 ENCOUNTER — HOSPITAL ENCOUNTER (EMERGENCY)
Facility: HOSPITAL | Age: 23
Discharge: HOME | End: 2018-07-28
Attending: INTERNAL MEDICINE | Admitting: INTERNAL MEDICINE

## 2018-07-28 VITALS
OXYGEN SATURATION: 100 % | TEMPERATURE: 97.4 F | DIASTOLIC BLOOD PRESSURE: 71 MMHG | RESPIRATION RATE: 16 BRPM | SYSTOLIC BLOOD PRESSURE: 125 MMHG

## 2018-07-28 DIAGNOSIS — K08.89 PAIN, DENTAL: ICD-10-CM

## 2018-07-28 PROCEDURE — 25000132 ZZH RX MED GY IP 250 OP 250 PS 637: Performed by: INTERNAL MEDICINE

## 2018-07-28 PROCEDURE — 99283 EMERGENCY DEPT VISIT LOW MDM: CPT

## 2018-07-28 PROCEDURE — 99283 EMERGENCY DEPT VISIT LOW MDM: CPT | Performed by: INTERNAL MEDICINE

## 2018-07-28 RX ORDER — KETOROLAC TROMETHAMINE 30 MG/ML
60 INJECTION, SOLUTION INTRAMUSCULAR; INTRAVENOUS ONCE
Status: DISCONTINUED | OUTPATIENT
Start: 2018-07-28 | End: 2018-07-28 | Stop reason: HOSPADM

## 2018-07-28 RX ORDER — IBUPROFEN 800 MG/1
800 TABLET, FILM COATED ORAL ONCE
Status: COMPLETED | OUTPATIENT
Start: 2018-07-28 | End: 2018-07-28

## 2018-07-28 RX ADMIN — IBUPROFEN 800 MG: 800 TABLET ORAL at 01:04

## 2018-07-28 ASSESSMENT — ENCOUNTER SYMPTOMS
ARTHRALGIAS: 0
HEADACHES: 0
NECK STIFFNESS: 0
CONFUSION: 0
COLOR CHANGE: 0
FEVER: 0
DIFFICULTY URINATING: 0
EYE REDNESS: 0
FACIAL SWELLING: 0
SHORTNESS OF BREATH: 0
ABDOMINAL PAIN: 0

## 2018-07-28 NOTE — ED AVS SNAPSHOT
[facility]    [address] [place] [address]    Phone:  [phone]                                       Aaron Conner   MRN: [id number]    Department:  [facility]   Date of Visit:  7/28/2018           After Visit Summary Signature Page     I have received my discharge instructions, and my questions have been answered. I have discussed any challenges I see with this plan with the nurse or doctor.    ..........................................................................................................................................  Patient/Patient Representative Signature      ..........................................................................................................................................  Patient Representative Print Name and Relationship to Patient    ..................................................               ................................................  Date                                            Time    ..........................................................................................................................................  Reviewed by Signature/Title    ...................................................              ..............................................  Date                                                            Time

## 2018-07-28 NOTE — ED AVS SNAPSHOT
" [facility]    [address] [address]    Phone:  [phone]                                       Aaron Conner   MRN: [id number]    Department:  [facility]   Date of Visit:  7/28/2018           Patient Information     Date Of Birth          1995        Your diagnoses for this visit were:     Pain, dental        You were seen by Sandip Melara MD.      Follow-up Information     Schedule an appointment as soon as possible for a visit with [facility].    Contact information:    [address] [facility] [address] [phone]          Discharge Instructions          *DENTAL PAIN    A crack or cavity in the tooth, which exposes the sensitive inner area of the tooth can cause tooth pain. An infection in the gum or the root of the tooth can cause pain and swelling. The pain is often made worse by drinking hot or cold fluids, or biting on hard foods. Pain may spread from the tooth to the ear or jaw on the same side.  HOME CARE:    1. Avoid hot and cold foods and liquids since your tooth may be sensitive to temperature changes.  2. If your tooth is chipped or cracked, or if there is a large open cavity, apply OIL OF CLOVES (available over-the-counter in drug stores) directly to the tooth to reduce pain. Some pharmacies carry an over-the-counter \"toothache kit.\" This contains a paste, which can be applied over the exposed tooth to decrease sensitivity. This is only a temporary solution. See a dentist as soon as possible to fix the tooth.  3. A cold pack on your jaw over the sore area may help reduce pain.  4. You may use acetaminophen (Tylenol) 650-1000 mg every 6 hours or ibuprofen (Motrin, Advil) 600 mg every 6-8 hours with food to control pain, if you are able to take these medicines. [ NOTE: If you have chronic liver or kidney disease or ever had a stomach ulcer or GI bleeding, talk with your doctor before using these medicines.]  5. If you have signs of an " infection, an antibiotic will be given. Take it as directed.  FOLLOW-UP as directed with a dentist. Your pain may go away with the treatment given. However, only a dentist can fully evaluate and treat the cause and prevent the pain from coming back again.  TOOTHACHE IS A SIGN OF DISEASE IN YOUR TOOTH AND SHOULD BE EXAMINED AND TREATED BY A DENTIST.  GET PROMPT MEDICAL ATTENTION if any of the following occur:    Your face becomes swollen or red    Pain worsens or spreads to the neck    Fever over 101  F (38.3  C)    Unusual drowsiness; headache or stiff neck; weakness or fainting    Pus drains from the tooth    Difficulty swallowing or breathing       9222-7126 [employer]. [address] [place] [address], [place] [address]. All rights reserved. This information is not intended as a substitute for professional medical care. Always follow your healthcare professional's instructions.  This information has been modified by your health care provider with permission from the publisher.         Review of your medicines      Our records show that you are taking the medicines listed below. If these are incorrect, please call your family doctor or clinic.        Dose / Directions Last dose taken    * amoxicillin-clavulanate 875-125 MG per tablet   Commonly known as:  AUGMENTIN   Dose:  1 tablet   Quantity:  20 tablet        Take 1 tablet by mouth 2 times daily   Refills:  0        * amoxicillin-clavulanate 875-125 MG per tablet   Commonly known as:  AUGMENTIN   Dose:  1 tablet   Quantity:  20 tablet        Take 1 tablet by mouth 2 times daily   Refills:  0        methylPREDNISolone 4 MG tablet   Commonly known as:  MEDROL DOSEPAK   Quantity:  21 tablet        Follow package instructions   Refills:  0        * Notice:  This list has 2 medication(s) that are the same as other medications prescribed for you. Read the directions carefully, and ask your doctor or other care provider to review them with you.            Orders  "Needing Specimen Collection     None      Pending Results     No orders found from 2018 to 2018.            Pending Culture Results     No orders found from 2018 to 2018.            Thank you for choosing [facility]       Thank you for choosing [facility] for your care. Our goal is always to provide you with excellent care. Hearing back from our patients is one way we can continue to improve our services. Please take a few minutes to complete the written survey that you may receive in the mail after you visit with us. Thank you!        [employer] Information     [employer] lets you send messages to your doctor, view your test results, renew your prescriptions, schedule appointments and more. To sign up, go to [email or web address] . Click on \"Log in\" on the left side of the screen, which will take you to the Welcome page. Then click on \"Sign up Now\" on the right side of the page.     You will be asked to enter the access code listed below, as well as some personal information. Please follow the directions to create your username and password.     Your access code is: [email or web address]  Expires: 10/26/2018  1:01 AM     Your access code will  in 90 days. If you need help or a new code, please call your [facility] clinic or [phone].        Care EveryWhere ID     This is your Care EveryWhere ID. This could be used by other organizations to access your [facility] medical records  [id number]        Equal Access to Services     [facility]: Hadii shi broussardo Somichaelali, waaxda luqadaha, qaybta kaalmada adeegyada, jean gusman . So [facility] [phone].    ATENCIÓN: Si habla español, tiene a mcgarry disposición servicios gratuitos de asistencia lingüística. Llame al [phone].    We comply with applicable federal civil rights laws and [place] laws. We do not discriminate on the basis of race, color, national origin, age, disability, sex, sexual orientation, or gender " identity.            After Visit Summary       This is your record. Keep this with you and show to your community pharmacist(s) and doctor(s) at your next visit.

## 2018-07-28 NOTE — ED NOTES
Pt c/o left lower dental pain that started Monday. Pt states that he has been unable to be seen by a dentist but was rx'd clindamycin, which he started yesterday.

## 2018-07-28 NOTE — DISCHARGE INSTRUCTIONS
"   *DENTAL PAIN    A crack or cavity in the tooth, which exposes the sensitive inner area of the tooth can cause tooth pain. An infection in the gum or the root of the tooth can cause pain and swelling. The pain is often made worse by drinking hot or cold fluids, or biting on hard foods. Pain may spread from the tooth to the ear or jaw on the same side.  HOME CARE:    1. Avoid hot and cold foods and liquids since your tooth may be sensitive to temperature changes.  2. If your tooth is chipped or cracked, or if there is a large open cavity, apply OIL OF CLOVES (available over-the-counter in drug stores) directly to the tooth to reduce pain. Some pharmacies carry an over-the-counter \"toothache kit.\" This contains a paste, which can be applied over the exposed tooth to decrease sensitivity. This is only a temporary solution. See a dentist as soon as possible to fix the tooth.  3. A cold pack on your jaw over the sore area may help reduce pain.  4. You may use acetaminophen (Tylenol) 650-1000 mg every 6 hours or ibuprofen (Motrin, Advil) 600 mg every 6-8 hours with food to control pain, if you are able to take these medicines. [ NOTE: If you have chronic liver or kidney disease or ever had a stomach ulcer or GI bleeding, talk with your doctor before using these medicines.]  5. If you have signs of an infection, an antibiotic will be given. Take it as directed.  FOLLOW-UP as directed with a dentist. Your pain may go away with the treatment given. However, only a dentist can fully evaluate and treat the cause and prevent the pain from coming back again.  TOOTHACHE IS A SIGN OF DISEASE IN YOUR TOOTH AND SHOULD BE EXAMINED AND TREATED BY A DENTIST.  GET PROMPT MEDICAL ATTENTION if any of the following occur:    Your face becomes swollen or red    Pain worsens or spreads to the neck    Fever over 101  F (38.3  C)    Unusual drowsiness; headache or stiff neck; weakness or fainting    Pus drains from the tooth    Difficulty " swallowing or breathing       2029-1536 [employer]. [address] [place] [address], [place] [address]. All rights reserved. This information is not intended as a substitute for professional medical care. Always follow your healthcare professional's instructions.  This information has been modified by your health care provider with permission from the publisher.

## 2018-07-29 NOTE — ED PROVIDER NOTES
History     Chief Complaint   Patient presents with     Dental Pain     Patient is a 22 year old male presenting with tooth pain. The history is provided by the patient.   Dental Pain   Location:  Lower  Lower teeth location:  17/LL 3rd molar  Quality:  Aching  Onset quality:  Gradual  Chronicity:  Recurrent  Associated symptoms: no congestion, no facial swelling, no fever and no headaches          Problem List:    There are no active problems to display for this patient.       Past Medical History:    Past Medical History:   Diagnosis Date     Allergic rhinitis, cause unspecified 12/10/2010     Anxiety 3/28/2011     Cephalgia 5/14/2012     Chronic tonsillitis 5/14/2012     Peripheral vertigo, unspecified 3/14/2011       Past Surgical History:    Past Surgical History:   Procedure Laterality Date     APPENDECTOMY      appendicitis     foreign  body removed from right long finger  1994     maxillary antrostomy  5/2011    LT       Family History:    Family History   Problem Relation Age of Onset     Cancer Maternal Grandfather      lung     Cancer Mother      ovarian       Social History:  Marital Status:  Single [1]  Social History   Substance Use Topics     Smoking status: Former Smoker     Packs/day: 0.50     Types: Cigarettes     Smokeless tobacco: Former User     Types: Chew      Comment: yes passive exposure, chews twice wekkly     Alcohol use No        Medications:      amoxicillin-clavulanate (AUGMENTIN) 875-125 MG per tablet   amoxicillin-clavulanate (AUGMENTIN) 875-125 MG per tablet   methylPREDNISolone (MEDROL DOSEPAK) 4 MG tablet         Review of Systems   Constitutional: Negative for fever.   HENT: Negative for congestion and facial swelling.    Eyes: Negative for redness.   Respiratory: Negative for shortness of breath.    Cardiovascular: Negative for chest pain.   Gastrointestinal: Negative for abdominal pain.   Genitourinary: Negative for difficulty urinating.   Musculoskeletal: Negative for  arthralgias and neck stiffness.   Skin: Negative for color change.   Neurological: Negative for headaches.   Psychiatric/Behavioral: Negative for confusion.       Physical Exam   BP: 125/71  Heart Rate: 96  Temp: 97.4  F (36.3  C)  Resp: 16  SpO2: 100 %      Physical Exam   Constitutional: No distress.   HENT:   Head: Atraumatic.   Mouth/Throat: Oropharynx is clear and moist. Normal dentition. Dental caries present. No oropharyngeal exudate.       Eyes: Pupils are equal, round, and reactive to light. No scleral icterus.   Cardiovascular: Normal heart sounds and intact distal pulses.    Pulmonary/Chest: Breath sounds normal. No respiratory distress.   Abdominal: Soft. Bowel sounds are normal. There is no tenderness.   Musculoskeletal: He exhibits no edema or tenderness.   Skin: Skin is warm. No rash noted. He is not diaphoretic.       ED Course     ED Course     Procedures           No results found for this or any previous visit (from the past 24 hour(s)).    Medications   ibuprofen (ADVIL/MOTRIN) tablet 800 mg (800 mg Oral Given 7/28/18 0104)       Assessments & Plan (with Medical Decision Making)   Dental pain  NSAIDS for pain controled  Fu with dental clinic  I have reviewed the nursing notes.    I have reviewed the findings, diagnosis, plan and need for follow up with the patient.      Discharge Medication List as of 7/28/2018  1:01 AM          Final diagnoses:   Pain, dental       7/28/2018   [facility]     Sandip Melara MD  07/28/18 2015

## 2018-11-05 ENCOUNTER — TELEPHONE (OUTPATIENT)
Dept: FAMILY MEDICINE | Facility: OTHER | Age: 23
End: 2018-11-05

## 2018-11-05 NOTE — TELEPHONE ENCOUNTER
8:45 AM    Reason for Call: OVERBOOK    Patient is having the following symptoms: sore throat for 2 days.    The patient is requesting an appointment for 11/05/18 with Anyone who is willing to fit him in .    Was an appointment offered for this call? No  If yes : Appointment type              Date    Preferred method for responding to this message: Telephone Call  What is your phone number ?[phone]    If we cannot reach you directly, may we leave a detailed response at the number you provided? Yes    Can this message wait until your PCP/provider returns, if unavailable today? No, pcp is out     Stephanie Steele

## 2018-11-21 ENCOUNTER — HOSPITAL ENCOUNTER (EMERGENCY)
Facility: HOSPITAL | Age: 23
Discharge: HOME | End: 2018-11-21
Attending: PHYSICIAN ASSISTANT | Admitting: PHYSICIAN ASSISTANT

## 2018-11-21 VITALS
TEMPERATURE: 98.6 F | DIASTOLIC BLOOD PRESSURE: 79 MMHG | SYSTOLIC BLOOD PRESSURE: 121 MMHG | WEIGHT: 130 LBS | RESPIRATION RATE: 16 BRPM | OXYGEN SATURATION: 99 % | HEART RATE: 100 BPM | BODY MASS INDEX: 20.98 KG/M2

## 2018-11-21 DIAGNOSIS — Z11.3 SCREENING EXAMINATION FOR VENEREAL DISEASE: ICD-10-CM

## 2018-11-21 DIAGNOSIS — Z13.9 SCREENING FOR CONDITION: ICD-10-CM

## 2018-11-21 LAB
ALBUMIN UR-MCNC: 10 MG/DL
AMORPH CRY #/AREA URNS HPF: ABNORMAL /HPF
APPEARANCE UR: ABNORMAL
BACTERIA #/AREA URNS HPF: ABNORMAL /HPF
BILIRUB UR QL STRIP: NEGATIVE
C TRACH DNA SPEC QL PROBE+SIG AMP: NOT DETECTED
COLOR UR AUTO: YELLOW
GLUCOSE UR STRIP-MCNC: NEGATIVE MG/DL
HGB UR QL STRIP: NEGATIVE
KETONES UR STRIP-MCNC: NEGATIVE MG/DL
LEUKOCYTE ESTERASE UR QL STRIP: NEGATIVE
MUCOUS THREADS #/AREA URNS LPF: PRESENT /LPF
N GONORRHOEA DNA SPEC QL PROBE+SIG AMP: NOT DETECTED
NITRATE UR QL: NEGATIVE
PH UR STRIP: 7 PH (ref 4.7–8)
RBC #/AREA URNS AUTO: 2 /HPF (ref 0–2)
SOURCE: ABNORMAL
SP GR UR STRIP: 1.02 (ref 1–1.03)
SPECIMEN SOURCE: NORMAL
UROBILINOGEN UR STRIP-MCNC: 2 MG/DL (ref 0–2)
WBC #/AREA URNS AUTO: <1 /HPF (ref 0–5)

## 2018-11-21 PROCEDURE — 99213 OFFICE O/P EST LOW 20 MIN: CPT | Performed by: PHYSICIAN ASSISTANT

## 2018-11-21 PROCEDURE — G0463 HOSPITAL OUTPT CLINIC VISIT: HCPCS

## 2018-11-21 PROCEDURE — 81001 URINALYSIS AUTO W/SCOPE: CPT | Performed by: FAMILY MEDICINE

## 2018-11-21 PROCEDURE — 87491 CHLMYD TRACH DNA AMP PROBE: CPT | Performed by: FAMILY MEDICINE

## 2018-11-21 PROCEDURE — 87591 N.GONORRHOEAE DNA AMP PROB: CPT | Performed by: FAMILY MEDICINE

## 2018-11-21 ASSESSMENT — ENCOUNTER SYMPTOMS
FATIGUE: 0
CARDIOVASCULAR NEGATIVE: 1
BACK PAIN: 0
ABDOMINAL PAIN: 0
NAUSEA: 0
PSYCHIATRIC NEGATIVE: 1
FEVER: 0
HEMATURIA: 0
DYSURIA: 1
APPETITE CHANGE: 0
FLANK PAIN: 0
RESPIRATORY NEGATIVE: 1
VOMITING: 0
FREQUENCY: 0
NEUROLOGICAL NEGATIVE: 1

## 2018-11-21 NOTE — ED AVS SNAPSHOT
[facility]    [address] [address]    Phone:  [phone]                                       Aaron Conner   MRN: [id number]    Department:  [facility]   Date of Visit:  11/21/2018           Patient Information     Date Of Birth          1995        Your diagnoses for this visit were:     Screening examination for venereal disease     Screening for condition Urine negative for bladder infection       You were seen by Bonnie Del Angel PA.      Follow-up Information     Follow up with [facility].    Specialty:  EMERGENCY MEDICINE    Why:  If symptoms worsen or if further concerns develop over the long weekend    Contact information:    [address] [address] [phone]    Additional information:    From [facility]: Take [address]. Turn left at [address]. Turn left at the first stoplight on [address]. At the first stop sign, take a right onto [address]. Take a left into the parking lot and continue through until you reach the North enterance of the building.       From [place]: Take [address]. Take the [address] ramp towards [place]. Turn left onto [address]. Take a slight right onto [address] [facility]. Turn left at the first stoplight on [address]. At the first stop sign, take a right onto [address]. Take a left into the parking lot and continue through until you reach the North enterance of the building.       From [place]: Take [address]. Take a right at [address]. At the first stop sign, take a right onto [address]. Take a left into the parking lot and continue through until you reach the North enterance of the building.       Discharge References/Attachments     URETHRITIS, INFEC VS INFLAM (ADULT MALE) (ENGLISH)         Review of your medicines      Notice     You have not been prescribed any medications.           "  Procedures and tests performed during your visit     GC/Chlamydia by PCR - HIGH    UA with Microscopic      Orders Needing Specimen Collection     None      Pending Results     Date and Time Order Name Status Description    2018 GC/Chlamydia by PCR - [facility]IGH In process             Pending Culture Results     Date and Time Order Name Status Description    2018 GC/Chlamydia by PCR - [facility] In process             Thank you for choosing [facility]       Thank you for choosing [facility] for your care. Our goal is always to provide you with excellent care. Hearing back from our patients is one way we can continue to improve our services. Please take a few minutes to complete the written survey that you may receive in the mail after you visit with us. Thank you!        [employer] Information     [employer] lets you send messages to your doctor, view your test results, renew your prescriptions, schedule appointments and more. To sign up, go to [email or web address] . Click on \"Log in\" on the left side of the screen, which will take you to the Welcome page. Then click on \"Sign up Now\" on the right side of the page.     You will be asked to enter the access code listed below, as well as some personal information. Please follow the directions to create your username and password.     Your access code is: [email or web address]  Expires: 2/3/2019  4:27 PM     Your access code will  in 90 days. If you need help or a new code, please call your [facility] clinic or [phone].        Care EveryWhere ID     This is your Care EveryWhere ID. This could be used by other organizations to access your [facility] medical records  [id number]        Equal Access to Services     [facility]: Hadii shi Zhou, waaxda luqadaha, qaybta kaalmajean sapp . So [facility] [phone].    ATENCIÓN: Si habla español, tiene a mcgarry disposición servicios gratuitos de asistencia lingüística. " Elizabeth platt [phone].    We comply with applicable federal civil rights laws and [place] laws. We do not discriminate on the basis of race, color, national origin, age, disability, sex, sexual orientation, or gender identity.            After Visit Summary       This is your record. Keep this with you and show to your community pharmacist(s) and doctor(s) at your next visit.

## 2018-11-21 NOTE — ED AVS SNAPSHOT
[facility]    [address] [place] [address]    Phone:  [phone]                                       Aaron Conner   MRN: [id number]    Department[facility]  [facility]   Date of Visit:  11/21/2018           After Visit Summary Signature Page     I have received my discharge instructions, and my questions have been answered. I have discussed any challenges I see with this plan with the nurse or doctor.    ..........................................................................................................................................  Patient/Patient Representative Signature      ..........................................................................................................................................  Patient Representative Print Name and Relationship to Patient    ..................................................               ................................................  Date                                   Time    ..........................................................................................................................................  Reviewed by Signature/Title    ...................................................              ..............................................  Date                                               Time          22EPIC Rev 08/18

## 2018-11-22 NOTE — ED PROVIDER NOTES
"  History     Chief Complaint   Patient presents with     Rule out Urinary Tract Infection     \"symptoms for 2 days\"      The history is provided by the patient. No  was used.     Aaron Conner is a 22 year old male who has 2 days of intermittent irritation with urination and irritation to the urethra. No n/v/d/f/c. No flank pain. No pelvic or bladder pain/pressure.  States his urine has white/clody aspect to it.  No blood in the urine. No decrease in energy      Past Medical History:    Past Medical History:   Diagnosis Date     Allergic rhinitis, cause unspecified 12/10/2010     Anxiety 3/28/2011     Cephalgia 5/14/2012     Chronic tonsillitis 5/14/2012     Peripheral vertigo, unspecified 3/14/2011       Past Surgical History:    Past Surgical History:   Procedure Laterality Date     APPENDECTOMY      appendicitis     foreign  body removed from right long finger  1994     maxillary antrostomy  5/2011    LT       Family History:    Family History   Problem Relation Age of Onset     Cancer Maternal Grandfather      lung     Cancer Mother      ovarian       Social History:  Marital Status:  Single [1]  Social History   Substance Use Topics     Smoking status: Former Smoker     Packs/day: 0.50     Types: Cigarettes     Smokeless tobacco: Former User     Types: Chew      Comment: yes passive exposure, chews twice wekkly     Alcohol use No        Medications:      No current outpatient prescriptions on file.      Review of Systems   Constitutional: Negative for appetite change, fatigue and fever.   Respiratory: Negative.    Cardiovascular: Negative.    Gastrointestinal: Negative for abdominal pain, nausea and vomiting.   Genitourinary: Positive for dysuria. Negative for discharge, flank pain, frequency, hematuria, penile pain, penile swelling, scrotal swelling, testicular pain and urgency.   Musculoskeletal: Negative for back pain.   Neurological: Negative.    Psychiatric/Behavioral: Negative.  "       Physical Exam   BP: 121/79  Pulse: 100  Temp: 98.6  F (37  C)  Resp: 16  Weight: 59 kg (130 lb)  SpO2: 99 %      Physical Exam   Constitutional: He is oriented to person, place, and time. He appears well-developed and well-nourished. No distress.   HENT:   Head: Atraumatic.   Cardiovascular: Normal rate, regular rhythm and normal heart sounds.    Pulmonary/Chest: Effort normal and breath sounds normal. No respiratory distress.   Abdominal: Soft. Bowel sounds are normal. He exhibits no distension.   NO CVA TTP   Neurological: He is alert and oriented to person, place, and time.   Skin: He is not diaphoretic.   Psychiatric: He has a normal mood and affect.   Nursing note and vitals reviewed.      ED Course     ED Course     Procedures            Results for orders placed or performed during the hospital encounter of 11/21/18 (from the past 24 hour(s))   GC/Chlamydia by PCR - [facility]   Result Value Ref Range    Specimen Source Urine     Neisseria gonorrhoreae PCR Not Detected NDET^Not Detected    Chlamydia Trachomatis PCR Not Detected NDET^Not Detected   UA with Microscopic   Result Value Ref Range    Color Urine Yellow     Appearance Urine Slightly Cloudy     Glucose Urine Negative NEG^Negative mg/dL    Bilirubin Urine Negative NEG^Negative    Ketones Urine Negative NEG^Negative mg/dL    Specific Gravity Urine 1.020 1.003 - 1.035    Blood Urine Negative NEG^Negative    pH Urine 7.0 4.7 - 8.0 pH    Protein Albumin Urine 10 (A) NEG^Negative mg/dL    Urobilinogen mg/dL 2.0 0.0 - 2.0 mg/dL    Nitrite Urine Negative NEG^Negative    Leukocyte Esterase Urine Negative NEG^Negative    Source Midstream Urine     WBC Urine <1 0 - 5 /HPF    RBC Urine 2 0 - 2 /HPF    Bacteria Urine None (A) NEG^Negative /HPF    Mucous Urine Present (A) NEG^Negative /LPF    Amorphous Crystals Few (A) NEG^Negative /HPF       Medications - No data to display    Assessments & Plan (with Medical Decision Making)     I have reviewed the nursing  notes.    I have reviewed the findings, diagnosis, plan and need for follow up with the patient.      There are no discharge medications for this patient.      Final diagnoses:   Screening examination for venereal disease   Screening for condition - Urine negative for bladder infection       Pt has the negative GC results  Patient verbally educated and given appropriate education sheets for the diagnoses and has no questions.   Follow up with your Primary Care provider if symptoms increase or if further concerns develop, return to the ER  Bonnie Del Angel Certified  Physician Assistant  11/21/2018  11:10 PM  URGENT CARE CLINIC      11/21/2018   [facility]     Bonnie Del Angel PA  11/21/18 [id number]

## 2018-11-22 NOTE — PROGRESS NOTES
Pt asked me to pedro starr when the results came in. I have relayed the negative GC results. He has no further questions.  Bonnie Del Angel Certified  Physician Assistant  11/21/2018  11:10 PM  URGENT CARE CLINIC

## 2018-11-22 NOTE — ED TRIAGE NOTES
"Pt presents today with c/o possible UTI. Symptoms; cloudy ness, \"discomfort'. Started 2 days ago.   "

## 2018-11-25 ENCOUNTER — HOSPITAL ENCOUNTER (EMERGENCY)
Facility: HOSPITAL | Age: 23
Discharge: HOME | End: 2018-11-25
Attending: PHYSICIAN ASSISTANT | Admitting: PHYSICIAN ASSISTANT

## 2018-11-25 VITALS
SYSTOLIC BLOOD PRESSURE: 121 MMHG | DIASTOLIC BLOOD PRESSURE: 73 MMHG | TEMPERATURE: 98.6 F | OXYGEN SATURATION: 98 % | RESPIRATION RATE: 16 BRPM

## 2018-11-25 DIAGNOSIS — N34.2 URETHRITIS: ICD-10-CM

## 2018-11-25 LAB
ALBUMIN UR-MCNC: NEGATIVE MG/DL
AMORPH CRY #/AREA URNS HPF: ABNORMAL /HPF
APPEARANCE UR: ABNORMAL
BACTERIA #/AREA URNS HPF: ABNORMAL /HPF
BILIRUB UR QL STRIP: NEGATIVE
C TRACH DNA SPEC QL PROBE+SIG AMP: NOT DETECTED
COLOR UR AUTO: YELLOW
GLUCOSE UR STRIP-MCNC: NEGATIVE MG/DL
HGB UR QL STRIP: ABNORMAL
KETONES UR STRIP-MCNC: NEGATIVE MG/DL
LEUKOCYTE ESTERASE UR QL STRIP: NEGATIVE
MUCOUS THREADS #/AREA URNS LPF: PRESENT /LPF
N GONORRHOEA DNA SPEC QL PROBE+SIG AMP: NOT DETECTED
NITRATE UR QL: NEGATIVE
PH UR STRIP: 7 PH (ref 4.7–8)
RBC #/AREA URNS AUTO: >182 /HPF (ref 0–2)
SOURCE: ABNORMAL
SP GR UR STRIP: 1.01 (ref 1–1.03)
SPECIMEN SOURCE: NORMAL
UROBILINOGEN UR STRIP-MCNC: NORMAL MG/DL (ref 0–2)
WBC #/AREA URNS AUTO: 7 /HPF (ref 0–5)

## 2018-11-25 PROCEDURE — 99283 EMERGENCY DEPT VISIT LOW MDM: CPT | Performed by: PHYSICIAN ASSISTANT

## 2018-11-25 PROCEDURE — 25000132 ZZH RX MED GY IP 250 OP 250 PS 637: Performed by: PHYSICIAN ASSISTANT

## 2018-11-25 PROCEDURE — 96372 THER/PROPH/DIAG INJ SC/IM: CPT

## 2018-11-25 PROCEDURE — 87491 CHLMYD TRACH DNA AMP PROBE: CPT | Performed by: PHYSICIAN ASSISTANT

## 2018-11-25 PROCEDURE — 25000125 ZZHC RX 250

## 2018-11-25 PROCEDURE — 87591 N.GONORRHOEAE DNA AMP PROB: CPT | Performed by: PHYSICIAN ASSISTANT

## 2018-11-25 PROCEDURE — 25000128 H RX IP 250 OP 636: Performed by: PHYSICIAN ASSISTANT

## 2018-11-25 PROCEDURE — 99284 EMERGENCY DEPT VISIT MOD MDM: CPT | Mod: 25

## 2018-11-25 PROCEDURE — 81001 URINALYSIS AUTO W/SCOPE: CPT | Performed by: PHYSICIAN ASSISTANT

## 2018-11-25 RX ORDER — AZITHROMYCIN 250 MG/1
1000 TABLET, FILM COATED ORAL ONCE
Status: COMPLETED | OUTPATIENT
Start: 2018-11-25 | End: 2018-11-25

## 2018-11-25 RX ORDER — CEFTRIAXONE SODIUM 1 G
250 VIAL (EA) INJECTION ONCE
Status: COMPLETED | OUTPATIENT
Start: 2018-11-25 | End: 2018-11-25

## 2018-11-25 RX ORDER — LIDOCAINE HYDROCHLORIDE 10 MG/ML
INJECTION, SOLUTION EPIDURAL; INFILTRATION; INTRACAUDAL; PERINEURAL
Status: COMPLETED
Start: 2018-11-25 | End: 2018-11-25

## 2018-11-25 RX ADMIN — LIDOCAINE HYDROCHLORIDE: 10 INJECTION, SOLUTION EPIDURAL; INFILTRATION; INTRACAUDAL; PERINEURAL at 17:53

## 2018-11-25 RX ADMIN — CEFTRIAXONE 250 MG: 1 INJECTION, POWDER, FOR SOLUTION INTRAMUSCULAR; INTRAVENOUS at 17:53

## 2018-11-25 RX ADMIN — AZITHROMYCIN 1000 MG: 250 TABLET, FILM COATED ORAL at 17:48

## 2018-11-25 ASSESSMENT — ENCOUNTER SYMPTOMS
ABDOMINAL PAIN: 0
CHILLS: 0
FEVER: 0

## 2018-11-25 NOTE — ED AVS SNAPSHOT
[facility]    [address] [address]    Phone:  [phone]                                       Aaron Conner   MRN: [id number]    Department[facility]  [facility]   Date of Visit:  11/25/2018           Patient Information     Date Of Birth          1995        Your diagnoses for this visit were:     Urethritis        You were seen by Nafisa Betancur PA-C.      Follow-up Information     Follow up with Osmel Salas MD In 5 days.    Specialty:  Family Practice    Contact information:    [address] [facility] [address]  [phone]          Follow up with [facility].    Specialty:  EMERGENCY MEDICINE    Why:  If symptoms worsen    Contact information:    [address] [address] [phone]    Additional information:    From [facility]: Take [address]. Turn left at [address]. Turn left at the first stoplight on [address] [place] [address]. At the first stop sign, take a right onto [address]. Take a left into the parking lot and continue through until you reach the North enterance of the building.       From [place]: Take [address]. Take the [address] ramp towards [place]. Turn left onto [address]. Take a slight right onto [address]. Turn left at the first stoplight on [address]. At the first stop sign, take a right onto [address]. Take a left into the parking lot and continue through until you reach the North enterance of the building.       From [place]: Take [address]. Take a right at [address] [facility]. At the first stop sign, take a right onto [address]. Take a left into the parking lot and continue through until you reach the North enterance of the building.         Discharge Instructions       I will call you if any of your results come back abnormal. You have been treated for both gonorrhea and chlamydia in the ED and no further treatment  is needed. Your sexual partner will need treated as well if any of your tests come back positive.     Urethritis in Men     An inflamed urethra can cause pain during urination.   The urethra is the tube that runs from the bladder through the penis. When the urethra is inflamed, it is called urethritis. The urethra becomes swollen and causes burning pain when you urinate. Other symptoms of urethritis may include itching or tingling of the penis or pus discharge from the penis. You may also have pain with sex and masturbation. Some men may not have symptoms.  What causes urethritis?  Urethritis can be caused by a bacterial or viral infection. Such an infection can lead to conditions such as a urinary tract infection (UTI) or sexually transmitted diseases (STD). Urethritis can also be caused by injury or sensitivity or allergy to chemicals in lotions and other products.  How is urethritis diagnosed?  Your healthcare provider will examine you and ask about your symptoms and health history. You may also have one or more of the following tests:    Urine test to take samples of urine and have them checked for problems.    Blood test to take a sample of blood and have it checked for problems.    Urethral discharge to take a sample of fluid from inside the urethra. A cotton swab is inserted into the opening of the penis and into the urethra.    Cystoscopy to allow the healthcare provider to look for problems in the urinary tract. The test uses a thin, flexible telescope called a cystoscope with a light and camera attached. The scope is inserted into the urethra.  How is urethritis treated?  Treatment depends on the cause of urethritis. If it s due to a bacterial infection, antibiotics (medicines that fight infection) will be given. Your healthcare provider can tell you more about your treatment options. In the meantime, your symptoms can be treated. To relieve pain and swelling, anti-inflammatory medications, such as  ibuprofen, may be given. Untreated, symptoms may get worse. Also, scar tissue can form in the urethra, causing it to narrow.  When to call your healthcare provider   Call your healthcare provider right away if you have any of the following:    Fever of 100.4 F (38.0 C) or higher     Blood in the urine or semen    Burning pain with urination    Increased urge to urinate    Discharge from the penis    Itching, tenderness, or swelling in the penis or groin    Pain during sex or masturbation   Preventing STDs  When it comes to sex, it s important to take care and be safe. Any sexual contact with the penis, vagina, anus, or mouth can spread an STD. The only sure way to prevent STDs is not having sex. But there are ways to make sex safer. Use a latex condom each time you have sex. And talk to your partner about STDs before you have sex.  Date Last Reviewed: 1/1/2017 2000-2018 [employer]. [address] [place] [address]. All rights reserved. This information is not intended as a substitute for professional medical care. Always follow your healthcare professional's instructions.             Review of your medicines      Notice     You have not been prescribed any medications.            Procedures and tests performed during your visit     GC/Chlamydia by PCR - [facility]    UA reflex to Microscopic and Culture      Orders Needing Specimen Collection     None      Pending Results     Date and Time Order Name Status Description    11/25/2018 1714 GC/Chlamydia by PCR - [facility] In process             Pending Culture Results     Date and Time Order Name Status Description    11/25/2018 1714 GC/Chlamydia by PCR - [facility] In process             Thank you for choosing [facility]       Thank you for choosing [facility] for your care. Our goal is always to provide you with excellent care. Hearing back from our patients is one way we can continue to improve our services. Please take a few minutes to complete the written  "survey that you may receive in the mail after you visit with us. Thank you!        [employer] Information     [employer] lets you send messages to your doctor, view your test results, renew your prescriptions, schedule appointments and more. To sign up, go to [email or web address] . Click on \"Log in\" on the left side of the screen, which will take you to the Welcome page. Then click on \"Sign up Now\" on the right side of the page.     You will be asked to enter the access code listed below, as well as some personal information. Please follow the directions to create your username and password.     Your access code is: [email or web address]  Expires: 2/3/2019  4:27 PM     Your access code will  in 90 days. If you need help or a new code, please call your [facility] clinic or [phone].        Care EveryWhere ID     This is your Care EveryWhere ID. This could be used by other organizations to access your [facility] medical records  [id number]        Equal Access to Services     [facility] : Hadii shi broussardo Sokarlo, waaxda luqadaha, qaybta kaalmapreeti angel, jean gusman . So [facility] [phone].    ATENCIÓN: Si habla español, tiene a mcgarry disposición servicios gratuitos de asistencia lingüística. Llame al [phone].    We comply with applicable federal civil rights laws and [place] laws. We do not discriminate on the basis of race, color, national origin, age, disability, sex, sexual orientation, or gender identity.            After Visit Summary       This is your record. Keep this with you and show to your community pharmacist(s) and doctor(s) at your next visit.                  "

## 2018-11-25 NOTE — ED NOTES
"Patient presents by himself for re-evaluation of urethral discharge and \"kidney pressure\" states his girlfriend had similar symptoms but was given an ABX and is feeling much better.  Patient is inquiring if the lab tests were \"wrong\" states when he voided for the GC/CHLAMYDIA test he had voided less then 10 minutes prior to giving the sample.  Patient states he continues to have a milky white discharge and dysuria that comes and goes.  States at times he has some \"tingling/itching\" sensation noted.  Patient is awake/alert and interactive.  Skin is pink, warm, dry and intact.  Appears to be in no distress.  Call light within reach.  "

## 2018-11-25 NOTE — ED AVS SNAPSHOT
[facility]    [address] [place] [address]    Phone:  [phone]                                       Aaron Conner   MRN: [id number]    Department[facility]  [facility]   Date of Visit:  11/25/2018           After Visit Summary Signature Page     I have received my discharge instructions, and my questions have been answered. I have discussed any challenges I see with this plan with the nurse or doctor.    ..........................................................................................................................................  Patient/Patient Representative Signature      ..........................................................................................................................................  Patient Representative Print Name and Relationship to Patient    ..................................................               ................................................  Date                                   Time    ..........................................................................................................................................  Reviewed by Signature/Title    ...................................................              ..............................................  Date                                               Time          22EPIC Rev 08/18

## 2018-11-26 NOTE — ED NOTES
Patient received discharge instructions and verbalized understanding.  Patient without any other questions at this time.  Will follow up with Dr. Salas in 5 days for a recheck.  Will return to the emergency room if symptoms get worse.  Home.

## 2018-11-26 NOTE — ED PROVIDER NOTES
History     Chief Complaint   Patient presents with     Flank Pain     c/o rt flank pain and penis discharge, notes seen for the same on wed HPI  Aaron Conner is a 22 year old male who presents with urethral pain and clear discharge from penis x 5 days. States his girlfriend had similar symptoms (dysuria/discharge) and was treated for sinusitis with Augmentin and her dysuria has also resolved. He was seen in the  last week for the same and had a negative GC/Chlamydia urine test so was not treated. He states the symptoms have been getting worse.     Problem List:    There are no active problems to display for this patient.       Past Medical History:    Past Medical History:   Diagnosis Date     Allergic rhinitis, cause unspecified 12/10/2010     Anxiety 3/28/2011     Cephalgia 5/14/2012     Chronic tonsillitis 5/14/2012     Peripheral vertigo, unspecified 3/14/2011       Past Surgical History:    Past Surgical History:   Procedure Laterality Date     APPENDECTOMY      appendicitis     foreign  body removed from right long finger  1994     maxillary antrostomy  5/2011    LT       Family History:    Family History   Problem Relation Age of Onset     Cancer Maternal Grandfather      lung     Cancer Mother      ovarian       Social History:  Marital Status:  Single [1]  Social History   Substance Use Topics     Smoking status: Current Every Day Smoker     Packs/day: 1.00     Types: Cigarettes     Smokeless tobacco: Former User     Types: Chew     Alcohol use Yes      Comment: social        Medications:      No current outpatient prescriptions on file.      Review of Systems   Constitutional: Negative for chills and fever.   Gastrointestinal: Negative for abdominal pain.   All other systems reviewed and are negative.      Physical Exam   BP: 142/80  Heart Rate: 115  Temp: 98.6  F (37  C)  Resp: 14  SpO2: 98 %      Physical Exam   Constitutional: He is oriented to person, place, and time. He appears  well-developed and well-nourished.  Non-toxic appearance. He does not have a sickly appearance. He does not appear ill. No distress.   HENT:   Head: Normocephalic and atraumatic.   Nose: Nose normal.   Mouth/Throat: Oropharynx is clear and moist. No oropharyngeal exudate.   Eyes: Conjunctivae are normal. Pupils are equal, round, and reactive to light. Right eye exhibits no discharge. Left eye exhibits no discharge. No scleral icterus.   Neck: Normal range of motion. Neck supple.   Cardiovascular: Normal rate, regular rhythm, normal heart sounds and intact distal pulses.  Exam reveals no gallop and no friction rub.    No murmur heard.  Pulmonary/Chest: Effort normal and breath sounds normal. No respiratory distress. He has no wheezes. He has no rales.   Abdominal: Soft. Bowel sounds are normal. He exhibits no distension and no mass. There is no tenderness. There is no rebound, no guarding and no CVA tenderness.   Musculoskeletal: Normal range of motion. He exhibits no edema.   Lymphadenopathy:     He has no cervical adenopathy.   Neurological: He is alert and oriented to person, place, and time. No cranial nerve deficit. Coordination normal.   Skin: Skin is warm and dry. No rash noted. He is not diaphoretic. No erythema. No pallor.   Psychiatric: He has a normal mood and affect. His behavior is normal. Judgment and thought content normal.   Nursing note and vitals reviewed.      ED Course     ED Course     Procedures            Results for orders placed or performed during the hospital encounter of 11/25/18 (from the past 24 hour(s))   GC/Chlamydia by PCR - [facility],   Result Value Ref Range    Specimen Source Urine     Neisseria gonorrhoreae PCR Not Detected NDET^Not Detected    Chlamydia Trachomatis PCR Not Detected NDET^Not Detected   UA reflex to Microscopic and Culture   Result Value Ref Range    Color Urine Yellow     Appearance Urine Slightly Cloudy     Glucose Urine Negative NEG^Negative mg/dL    Bilirubin Urine  Negative NEG^Negative    Ketones Urine Negative NEG^Negative mg/dL    Specific Gravity Urine 1.010 1.003 - 1.035    Blood Urine Large (A) NEG^Negative    pH Urine 7.0 4.7 - 8.0 pH    Protein Albumin Urine Negative NEG^Negative mg/dL    Urobilinogen mg/dL Normal 0.0 - 2.0 mg/dL    Nitrite Urine Negative NEG^Negative    Leukocyte Esterase Urine Negative NEG^Negative    Source Midstream Urine     RBC Urine >182 (H) 0 - 2 /HPF    WBC Urine 7 (H) 0 - 5 /HPF    Bacteria Urine Few (A) NEG^Negative /HPF    Mucous Urine Present (A) NEG^Negative /LPF    Amorphous Crystals Few (A) NEG^Negative /HPF       Medications   azithromycin (ZITHROMAX) tablet 1,000 mg (1,000 mg Oral Given 11/25/18 1748)   cefTRIAXone (ROCEPHIN) injection 250 mg (250 mg Intramuscular Given 11/25/18 1753)   lidocaine (PF) (XYLOCAINE) 1 % injection (  Given 11/25/18 1753)       Assessments & Plan (with Medical Decision Making)   Aaron presents with continued urethritis and clear penile discharge. He is in NAD. No CVA tenderness. No abdominal tenderness. Girlfriend had similar symptoms. Suspect STI. Will treat him with Rocephin 250mg IM and Azithromycin 1g PO. He did not want to wait for GC/Chlamydia results. He was discharged home following in good condition.   GC/Chalmydia came back negative which I informed pt of via phone. Recommended if he is not feeling better in 2 days, he f/u with Dr. Dumont, urologist. If he becomes worse, return here. He verbalizes understanding.     Plan: I will call you if any of your results come back abnormal. You have been treated for both gonorrhea and chlamydia in the ED and no further treatment is needed. Your sexual partner will need treated as well if any of your tests come back positive.     I have reviewed the nursing notes.    I have reviewed the findings, diagnosis, plan and need for follow up with the patient.    There are no discharge medications for this patient.      Final diagnoses:   Urethritis       11/25/2018    [facility]     Nafisa Betancur PA-C  11/25/18 1942

## 2018-11-30 ENCOUNTER — OFFICE VISIT (OUTPATIENT)
Dept: FAMILY MEDICINE | Facility: OTHER | Age: 23
End: 2018-11-30
Attending: FAMILY MEDICINE

## 2018-11-30 VITALS
BODY MASS INDEX: 20.33 KG/M2 | HEART RATE: 88 BPM | TEMPERATURE: 98 F | HEIGHT: 65 IN | SYSTOLIC BLOOD PRESSURE: 118 MMHG | DIASTOLIC BLOOD PRESSURE: 66 MMHG | WEIGHT: 122 LBS | OXYGEN SATURATION: 96 %

## 2018-11-30 DIAGNOSIS — R30.0 DYSURIA: Primary | ICD-10-CM

## 2018-11-30 DIAGNOSIS — N34.3 URETHRITIS, NOT SEXUALLY TRANSMITTED: ICD-10-CM

## 2018-11-30 DIAGNOSIS — N34.1 URETHRITIS, NOT SEXUALLY TRANSMITTED: ICD-10-CM

## 2018-11-30 LAB
ALBUMIN UR-MCNC: 10 MG/DL
AMORPH CRY #/AREA URNS HPF: ABNORMAL /HPF
APPEARANCE UR: ABNORMAL
BACTERIA #/AREA URNS HPF: ABNORMAL /HPF
BILIRUB UR QL STRIP: NEGATIVE
C TRACH DNA SPEC QL PROBE+SIG AMP: NOT DETECTED
CAOX CRY #/AREA URNS HPF: ABNORMAL /HPF
COLOR UR AUTO: YELLOW
GLUCOSE UR STRIP-MCNC: NEGATIVE MG/DL
HGB UR QL STRIP: ABNORMAL
KETONES UR STRIP-MCNC: NEGATIVE MG/DL
LEUKOCYTE ESTERASE UR QL STRIP: NEGATIVE
MUCOUS THREADS #/AREA URNS LPF: PRESENT /LPF
N GONORRHOEA DNA SPEC QL PROBE+SIG AMP: NOT DETECTED
NITRATE UR QL: NEGATIVE
PH UR STRIP: 6 PH (ref 4.7–8)
RBC #/AREA URNS AUTO: 6 /HPF (ref 0–2)
SOURCE: ABNORMAL
SP GR UR STRIP: 1.02 (ref 1–1.03)
SPECIMEN SOURCE: NORMAL
UROBILINOGEN UR STRIP-MCNC: NORMAL MG/DL (ref 0–2)
WBC #/AREA URNS AUTO: 3 /HPF (ref 0–5)

## 2018-11-30 PROCEDURE — 99213 OFFICE O/P EST LOW 20 MIN: CPT | Performed by: FAMILY MEDICINE

## 2018-11-30 PROCEDURE — 87491 CHLMYD TRACH DNA AMP PROBE: CPT | Performed by: FAMILY MEDICINE

## 2018-11-30 PROCEDURE — 87591 N.GONORRHOEAE DNA AMP PROB: CPT | Performed by: FAMILY MEDICINE

## 2018-11-30 PROCEDURE — 81001 URINALYSIS AUTO W/SCOPE: CPT | Performed by: FAMILY MEDICINE

## 2018-11-30 ASSESSMENT — ANXIETY QUESTIONNAIRES
2. NOT BEING ABLE TO STOP OR CONTROL WORRYING: NOT AT ALL
1. FEELING NERVOUS, ANXIOUS, OR ON EDGE: SEVERAL DAYS
4. TROUBLE RELAXING: NOT AT ALL
7. FEELING AFRAID AS IF SOMETHING AWFUL MIGHT HAPPEN: NOT AT ALL
3. WORRYING TOO MUCH ABOUT DIFFERENT THINGS: NOT AT ALL
GAD7 TOTAL SCORE: 1
5. BEING SO RESTLESS THAT IT IS HARD TO SIT STILL: NOT AT ALL
6. BECOMING EASILY ANNOYED OR IRRITABLE: NOT AT ALL

## 2018-11-30 ASSESSMENT — PAIN SCALES - GENERAL: PAINLEVEL: NO PAIN (0)

## 2018-11-30 ASSESSMENT — PATIENT HEALTH QUESTIONNAIRE - PHQ9: SUM OF ALL RESPONSES TO PHQ QUESTIONS 1-9: 0

## 2018-11-30 NOTE — MR AVS SNAPSHOT
"              After Visit Summary   2018    Aaron Conner    MRN: [id number]           Patient Information     Date Of Birth          1995        Visit Information        Provider Department      2018 10:30 AM Osmel Salas MD [facility]        Today's Diagnoses     Dysuria    -  1    Urethritis, not sexually transmitted           Follow-ups after your visit        Who to contact     If you have questions or need follow up information about today's clinic visit or your schedule please contact [facility] directly at [phone].  Normal or non-critical lab and imaging results will be communicated to you by OpenCloudhart, letter or phone within 4 business days after the clinic has received the results. If you do not hear from us within 7 days, please contact the clinic through OpenCloudhart or phone. If you have a critical or abnormal lab result, we will notify you by phone as soon as possible.  Submit refill requests through MitraSpan or call your pharmacy and they will forward the refill request to us. Please allow 3 business days for your refill to be completed.          Additional Information About Your Visit        MyChart Information     MitraSpan lets you send messages to your doctor, view your test results, renew your prescriptions, schedule appointments and more. To sign up, go to [email or web address] . Click on \"Log in\" on the left side of the screen, which will take you to the Welcome page. Then click on \"Sign up Now\" on the right side of the page.     You will be asked to enter the access code listed below, as well as some personal information. Please follow the directions to create your username and password.     Your access code is: [email or web address]  Expires: 2/3/2019  4:27 PM     Your access code will  in 90 days. If you need help or a new code, please call your [place] clinic or [phone].        Care EveryWhere ID     This is your Care " "EveryWhere ID. This could be used by other organizations to access your [place] medical records  [id number]        Your Vitals Were     Pulse Temperature Height Pulse Oximetry BMI (Body Mass Index)       88 98  F (36.7  C) 5' 5.25\" (1.657 m) 96% 20.15 kg/m2        Blood Pressure from Last 3 Encounters:   11/30/18 118/66   11/25/18 121/73   11/21/18 121/79    Weight from Last 3 Encounters:   11/30/18 122 lb (55.3 kg)   11/21/18 130 lb (59 kg)   11/24/17 125 lb (56.7 kg)              We Performed the Following     GC/Chlamydia by PCR - [facility]     UA with Microscopic reflex to Culture        Primary Care Provider Office Phone # Fax #    Osmel Salas [phone] [address] [facility] [address]        Equal Access to Services     [facility]: Hadii shi broussardo Sokarlo, waaxda luqadaha, qaybta kaalmada adeegyada, jean gusman . So [facility] [phone].    ATENCIÓN: Si habla español, tiene a mcgarry disposición servicios gratuitos de asistencia lingüística. Llame al [phone].    We comply with applicable federal civil rights laws and [place] laws. We do not discriminate on the basis of race, color, national origin, age, disability, sex, sexual orientation, or gender identity.            Thank you!     Thank you for choosing [facility]  for your care. Our goal is always to provide you with excellent care. Hearing back from our patients is one way we can continue to improve our services. Please take a few minutes to complete the written survey that you may receive in the mail after your visit with us. Thank you!             Your Updated Medication List - Protect others around you: Learn how to safely use, store and throw away your medicines at www.disposemymeds.org.      Notice  As of 11/30/2018 11:57 AM    You have not been prescribed any medications.      "

## 2018-11-30 NOTE — PROGRESS NOTES
"  SUBJECTIVE:   Aaron Conner is a 22 year old male who presents to clinic today for the following health issues:      ED/UC Followup:    Facility:  [facility]  Date of visit: 11/21/18 and 11/25/18  Reason for visit: urethritis  Current Status: still having symptoms of white urine and and discharge, feels itchy at times  Getting some better after last visit             Problem list and histories reviewed & adjusted, as indicated.  Additional history: as documented    Labs reviewed in EPIC    Reviewed and updated as needed this visit by clinical staff       Reviewed and updated as needed this visit by Provider         ROS:  CONSTITUTIONAL: NEGATIVE for fever, chills, change in weight    OBJECTIVE:                                                    /66  Pulse 88  Temp 98  F (36.7  C)  Ht 5' 5.25\" (1.657 m)  Wt 122 lb (55.3 kg)  SpO2 96%  BMI 20.15 kg/m2  Body mass index is 20.15 kg/(m^2).   GENERAL: healthy, alert, well nourished, well hydrated, no distress  Exam deferred  Results for orders placed or performed in visit on 11/30/18 (from the past 24 hour(s))   UA with Microscopic reflex to Culture   Result Value Ref Range    Color Urine Yellow     Appearance Urine Slightly Cloudy     Glucose Urine Negative NEG^Negative mg/dL    Bilirubin Urine Negative NEG^Negative    Ketones Urine Negative NEG^Negative mg/dL    Specific Gravity Urine 1.021 1.003 - 1.035    Blood Urine Small (A) NEG^Negative    pH Urine 6.0 4.7 - 8.0 pH    Protein Albumin Urine 10 (A) NEG^Negative mg/dL    Urobilinogen mg/dL Normal 0.0 - 2.0 mg/dL    Nitrite Urine Negative NEG^Negative    Leukocyte Esterase Urine Negative NEG^Negative    Source Midstream Urine     WBC Urine 3 0 - 5 /HPF    RBC Urine 6 (H) 0 - 2 /HPF    Bacteria Urine None (A) NEG^Negative /HPF    Mucous Urine Present (A) NEG^Negative /LPF    Calcium Oxalate Few (A) NEG^Negative /HPF    Amorphous Crystals Few (A) NEG^Negative /HPF          ASSESSMENT/PLAN:                         "                                ICD-10-CM    1. Dysuria R30.0 UA with Microscopic reflex to Culture     GC/Chlamydia by PCR - [facility]   2. Urethritis, not sexually transmitted N34.2      Improving some / UA better and less sx. - reassurance given. Push fluids. Alleve prn. Symptomatic treatment was discussed along when patient should call and/or come back into the clinic or go to ER/Urgent care. All questions answered.   If reoccurs -- consider round of doxy. Will recheck gc/clam.         Osmel Salas MD  [facility] [place]

## 2018-11-30 NOTE — NURSING NOTE
"No chief complaint on file.      Initial /66  Pulse 88  Temp 98  F (36.7  C)  Ht 5' 5.25\" (1.657 m)  Wt 122 lb (55.3 kg)  SpO2 96%  BMI 20.15 kg/m2 Estimated body mass index is 20.15 kg/(m^2) as calculated from the following:    Height as of this encounter: 5' 5.25\" (1.657 m).    Weight as of this encounter: 122 lb (55.3 kg).  Medication Reconciliation: complete    Song Odom LPN    "

## 2018-12-01 ASSESSMENT — ANXIETY QUESTIONNAIRES: GAD7 TOTAL SCORE: 1

## 2019-02-13 ENCOUNTER — TELEPHONE (OUTPATIENT)
Dept: FAMILY MEDICINE | Facility: OTHER | Age: 24
End: 2019-02-13

## 2019-02-13 NOTE — TELEPHONE ENCOUNTER
4:34 PM    Reason for Call: OVERBOOK    Patient is having the following symptoms: Patient got a tatoo on his Left arm last Friday & he thinks it may be infected for 3 days.    The patient is requesting an appointment for 2-14-19 with Dr. Salas.    Was an appointment offered for this call? No  If yes : Appointment type              Date    Preferred method for responding to this message: Telephone Call  What is your phone number ?[phone]    If we cannot reach you directly, may we leave a detailed response at the number you provided? Yes    Can this message wait until your PCP/provider returns, if unavailable today? Not applicable, PCP is in.    Shasha Garnica

## 2019-05-14 ENCOUNTER — NURSE TRIAGE (OUTPATIENT)
Dept: FAMILY MEDICINE | Facility: OTHER | Age: 24
End: 2019-05-14

## 2019-05-14 NOTE — TELEPHONE ENCOUNTER
Reason for Disposition    Cough has been present for > 3 weeks    Additional Information    Negative: Severe difficulty breathing (e.g., struggling for each breath, speaks in single words)    Negative: Bluish (or gray) lips or face now    Negative: [1] Difficulty breathing AND [2] exposure to flames, smoke, or fumes    Negative: [1] Stridor AND [2] difficulty breathing    Negative: Sounds like a life-threatening emergency to the triager    Negative: [1] Previous asthma attacks AND [2] this feels like asthma attack    Negative: Dry (non-productive) cough (i.e., no sputum or minimal clear sputum)    Negative: Chest pain  (Exception: MILD central chest pain, present only when coughing)    Negative: Difficulty breathing    Negative: Patient sounds very sick or weak to the triager    Negative: [1] Coughed up blood AND [2] > 1 tablespoon (15 ml) (Exception: blood-tinged sputum)    Negative: Fever > 103 F (39.4 C)    Negative: [1] Fever > 101 F (38.3 C) AND [2] age > 60    Negative: [1] Fever > 100.0 F (37.8 C) AND [2] bedridden (e.g., nursing home patient, CVA, chronic illness, recovering from surgery)    Negative: [1] Fever > 100.0 F (37.8 C) AND [2] diabetes mellitus or weak immune system (e.g., HIV positive, cancer chemo, splenectomy, chronic steroids)    Negative: Wheezing is present    Negative: SEVERE coughing spells (e.g., whooping sound after coughing, vomiting after coughing)    Negative: [1] Continuous (nonstop) coughing interferes with work or school AND [2] no improvement using cough treatment per Care Advice    Negative: Coughing up kristi-colored (reddish-brown) sputum    Negative: Fever present > 3 days (72 hours)    Negative: [1] Fever returns after gone for over 24 hours AND [2] symptoms worse or not improved    Negative: [1] Using nasal washes and pain medicine > 24 hours AND [2] sinus pain (around cheekbone or eye) persists    Negative: Earache    Negative: [1] Known COPD or other severe lung disease  "(i.e., bronchiectasis, cystic fibrosis, lung surgery) AND [2] worsening symptoms (i.e., increased sputum purulence or amount, increased breathing difficulty    Answer Assessment - Initial Assessment Questions  1. ONSET: \"When did the cough begin?\"       2 weeks ago  2. SEVERITY: \"How bad is the cough today?\"       yes  3. RESPIRATORY DISTRESS: \"Describe your breathing.\"       yes  4. FEVER: \"Do you have a fever?\" If so, ask: \"What is your temperature, how was it measured, and when did it start?\"      no  5. SPUTUM: \"Describe the color of your sputum\" (clear, white, yellow, green)       Greenish sputum  6. HEMOPTYSIS: \"Are you coughing up any blood?\" If so ask: \"How much?\" (flecks, streaks, tablespoons, etc.)      no  7. CARDIAC HISTORY: \"Do you have any history of heart disease?\" (e.g., heart attack, congestive heart failure)       no  8. LUNG HISTORY: \"Do you have any history of lung disease?\"  (e.g., pulmonary embolus, asthma, emphysema)      lung  9. PE RISK FACTORS: \"Do you have a history of blood clots?\" (or: recent major surgery, recent prolonged travel, bedridden )      no  10. OTHER SYMPTOMS: \"Do you have any other symptoms?\" (e.g., runny nose, wheezing, chest pain)        Congestion, siunus pressure  11. PREGNANCY: \"Is there any chance you are pregnant?\" \"When was your last menstrual period?\"          12. TRAVEL: \"Have you traveled out of the country in the last month?\" (e.g., travel history, exposures)        no    Protocols used: COUGH - ACUTE [id number]      "

## 2019-05-15 ENCOUNTER — OFFICE VISIT (OUTPATIENT)
Dept: FAMILY MEDICINE | Facility: OTHER | Age: 24
End: 2019-05-15
Attending: FAMILY MEDICINE
Payer: MEDICAID

## 2019-05-15 VITALS
BODY MASS INDEX: 24.11 KG/M2 | SYSTOLIC BLOOD PRESSURE: 110 MMHG | HEIGHT: 62 IN | OXYGEN SATURATION: 98 % | DIASTOLIC BLOOD PRESSURE: 60 MMHG | HEART RATE: 81 BPM | WEIGHT: 131 LBS | TEMPERATURE: 97.2 F

## 2019-05-15 DIAGNOSIS — J20.9 ACUTE BRONCHITIS, UNSPECIFIED ORGANISM: Primary | ICD-10-CM

## 2019-05-15 PROCEDURE — 99213 OFFICE O/P EST LOW 20 MIN: CPT | Performed by: FAMILY MEDICINE

## 2019-05-15 RX ORDER — AZITHROMYCIN 250 MG/1
TABLET, FILM COATED ORAL
Qty: 6 TABLET | Refills: 0 | Status: SHIPPED | OUTPATIENT
Start: 2019-05-15 | End: 2019-06-10

## 2019-05-15 RX ORDER — LORATADINE 10 MG/1
10 TABLET ORAL DAILY
COMMUNITY
End: 2019-06-10

## 2019-05-15 ASSESSMENT — PAIN SCALES - GENERAL: PAINLEVEL: NO PAIN (0)

## 2019-05-15 ASSESSMENT — MIFFLIN-ST. JEOR: SCORE: 1467.43

## 2019-05-15 NOTE — NURSING NOTE
"Chief Complaint   Patient presents with     URI       Initial /60   Pulse 81   Temp 97.2  F (36.2  C)   Ht 1.581 m (5' 2.25\")   Wt 59.4 kg (131 lb)   SpO2 98%   BMI 23.77 kg/m   Estimated body mass index is 23.77 kg/m  as calculated from the following:    Height as of this encounter: 1.581 m (5' 2.25\").    Weight as of this encounter: 59.4 kg (131 lb).  Medication Reconciliation: complete    Song Odom LPN  "

## 2019-05-15 NOTE — PROGRESS NOTES
"  SUBJECTIVE:   Aaron Conner is a 23 year old male who presents to clinic today for the following   health issues:      RESPIRATORY SYMPTOMS      Duration: over 2 weeks    Description  nasal congestion, facial pain/pressure, cough, chills, headache, hoarse voice and myalgias    Severity: moderate    Accompanying signs and symptoms: SOB and dizziness    History (predisposing factors):  tobacco abuse    Precipitating or alleviating factors: None    Therapies tried and outcome:  Antihistamine              Additional history: as documented    Reviewed  and updated as needed this visit by clinical staff  Tobacco  Allergies  Meds  Med Hx  Surg Hx  Fam Hx  Soc Hx        Reviewed and updated as needed this visit by Provider         Labs reviewed in EPIC    ROS:  CONSTITUTIONAL: NEGATIVE for fever, chills, change in weight    OBJECTIVE:                                                    /60   Pulse 81   Temp 97.2  F (36.2  C)   Ht 1.581 m (5' 2.25\")   Wt 59.4 kg (131 lb)   SpO2 98%   BMI 23.77 kg/m    Body mass index is 23.77 kg/m .   GENERAL: healthy, alert, well nourished, well hydrated, no distress  HENT: ear canals- normal; TMs- normal; Nose- normal; Mouth- no ulcers, no lesions  NECK: no tenderness, no adenopathy, no asymmetry, no masses, no stiffness; thyroid- normal to palpation  RESP: lungs not  clear to auscultation - no rales, few rhonchi, no wheezes         ASSESSMENT/PLAN:                                                    (J20.9) Acute bronchitis, unspecified organism  (primary encounter diagnosis)  Comment: will treat   Plan: azithromycin (ZITHROMAX) 250 MG tablet        Symptomatic treatment was discussed along what is available for OTC medications for symptomatic relief.   Symptomatic treatment was discussed along when patient should call and/or come back into the clinic or go to ER/Urgent care. All questions answered.       Keep off cigs. Discussed.             Osmel Salas, " MD  [facility] [place]

## 2019-06-10 ENCOUNTER — APPOINTMENT (OUTPATIENT)
Dept: CT IMAGING | Facility: HOSPITAL | Age: 24
End: 2019-06-10
Attending: EMERGENCY MEDICINE
Payer: MEDICAID

## 2019-06-10 ENCOUNTER — HOSPITAL ENCOUNTER (EMERGENCY)
Facility: HOSPITAL | Age: 24
Discharge: HOME | End: 2019-06-10
Attending: EMERGENCY MEDICINE | Admitting: EMERGENCY MEDICINE
Payer: MEDICAID

## 2019-06-10 VITALS
RESPIRATION RATE: 14 BRPM | SYSTOLIC BLOOD PRESSURE: 136 MMHG | TEMPERATURE: 98.2 F | OXYGEN SATURATION: 99 % | DIASTOLIC BLOOD PRESSURE: 71 MMHG

## 2019-06-10 DIAGNOSIS — N13.30 HYDRONEPHROSIS, UNSPECIFIED HYDRONEPHROSIS TYPE: ICD-10-CM

## 2019-06-10 DIAGNOSIS — N23 RENAL COLIC ON LEFT SIDE: ICD-10-CM

## 2019-06-10 LAB
ALBUMIN UR-MCNC: 10 MG/DL
APPEARANCE UR: CLEAR
BACTERIA #/AREA URNS HPF: ABNORMAL /HPF
BILIRUB UR QL STRIP: NEGATIVE
COLOR UR AUTO: YELLOW
GLUCOSE UR STRIP-MCNC: NEGATIVE MG/DL
HGB UR QL STRIP: ABNORMAL
KETONES UR STRIP-MCNC: 40 MG/DL
LEUKOCYTE ESTERASE UR QL STRIP: NEGATIVE
MUCOUS THREADS #/AREA URNS LPF: PRESENT /LPF
NITRATE UR QL: NEGATIVE
PH UR STRIP: 5.5 PH (ref 4.7–8)
RBC #/AREA URNS AUTO: >182 /HPF (ref 0–2)
SOURCE: ABNORMAL
SP GR UR STRIP: 1.03 (ref 1–1.03)
SQUAMOUS #/AREA URNS AUTO: 1 /HPF (ref 0–1)
UROBILINOGEN UR STRIP-MCNC: NORMAL MG/DL (ref 0–2)
WBC #/AREA URNS AUTO: 4 /HPF (ref 0–5)

## 2019-06-10 PROCEDURE — 99284 EMERGENCY DEPT VISIT MOD MDM: CPT | Mod: Z6 | Performed by: EMERGENCY MEDICINE

## 2019-06-10 PROCEDURE — 99284 EMERGENCY DEPT VISIT MOD MDM: CPT | Mod: 25

## 2019-06-10 PROCEDURE — 74176 CT ABD & PELVIS W/O CONTRAST: CPT | Mod: TC

## 2019-06-10 PROCEDURE — 81001 URINALYSIS AUTO W/SCOPE: CPT | Performed by: EMERGENCY MEDICINE

## 2019-06-10 RX ORDER — IBUPROFEN 800 MG/1
800 TABLET, FILM COATED ORAL EVERY 8 HOURS PRN
Qty: 30 TABLET | Refills: 0 | Status: SHIPPED | OUTPATIENT
Start: 2019-06-10 | End: 2019-08-21

## 2019-06-10 RX ORDER — TAMSULOSIN HYDROCHLORIDE 0.4 MG/1
0.4 CAPSULE ORAL DAILY
Qty: 7 CAPSULE | Refills: 0 | Status: SHIPPED | OUTPATIENT
Start: 2019-06-10 | End: 2019-06-18

## 2019-06-10 NOTE — ED NOTES
States long (7 hr) drive yesterday. Started having pain in left flank yesterday on the way home. Noted blood in his urine last night. Today, having hard time getting urine to flow. Voided for urine specimen, only able to get about 5 ml urine. Specimen sent to lab.

## 2019-06-10 NOTE — ED NOTES
Face to face report given with opportunity to observe patient.    Report given to KENNY Buckley   6/10/2019  6:52 PM

## 2019-06-10 NOTE — ED AVS SNAPSHOT
[facility]  [address] [place] [id number]  Phone:  [phone]                                    Aaron Conner   MRN: [id number]    Department:  [facility]   Date of Visit:  6/10/2019           After Visit Summary Signature Page    I have received my discharge instructions, and my questions have been answered. I have discussed any challenges I see with this plan with the nurse or doctor.    ..........................................................................................................................................  Patient/Patient Representative Signature      ..........................................................................................................................................  Patient Representative Print Name and Relationship to Patient    ..................................................               ................................................  Date                                   Time    ..........................................................................................................................................  Reviewed by Signature/Title    ...................................................              ..............................................  Date                                               Time          22EPIC Rev 08/18

## 2019-06-11 NOTE — DISCHARGE INSTRUCTIONS
Return if any new or concerning symptoms.    Ibuprofen for pain.  Flomax as per prescription  Drain urine for determination of  stone composition in urology clinic follow-up.

## 2019-06-11 NOTE — ED NOTES
Discharge instructions reviewed with patient.  2 Rx's given to patient to fill at pharmacy of choice.  Pt was given strainer and container to attempt to collect stone.  Pt to make f/u with  and will return to nearest ED if pt experiences new or worsening symptoms. Pt declined discharge vitals. No questions or concerns.  Copy of AVS given

## 2019-06-18 ENCOUNTER — OFFICE VISIT (OUTPATIENT)
Dept: FAMILY MEDICINE | Facility: OTHER | Age: 24
End: 2019-06-18
Attending: FAMILY MEDICINE
Payer: MEDICAID

## 2019-06-18 VITALS
BODY MASS INDEX: 23.92 KG/M2 | HEART RATE: 88 BPM | TEMPERATURE: 98.3 F | WEIGHT: 130 LBS | DIASTOLIC BLOOD PRESSURE: 60 MMHG | OXYGEN SATURATION: 98 % | HEIGHT: 62 IN | SYSTOLIC BLOOD PRESSURE: 110 MMHG

## 2019-06-18 DIAGNOSIS — N23 RENAL COLIC: ICD-10-CM

## 2019-06-18 DIAGNOSIS — R30.0 DYSURIA: Primary | ICD-10-CM

## 2019-06-18 LAB
ALBUMIN UR-MCNC: NEGATIVE MG/DL
APPEARANCE UR: CLEAR
BACTERIA #/AREA URNS HPF: ABNORMAL /HPF
BILIRUB UR QL STRIP: NEGATIVE
COLOR UR AUTO: ABNORMAL
GLUCOSE UR STRIP-MCNC: NEGATIVE MG/DL
HGB UR QL STRIP: ABNORMAL
KETONES UR STRIP-MCNC: NEGATIVE MG/DL
LEUKOCYTE ESTERASE UR QL STRIP: NEGATIVE
MUCOUS THREADS #/AREA URNS LPF: PRESENT /LPF
NITRATE UR QL: NEGATIVE
PH UR STRIP: 7.5 PH (ref 4.7–8)
RBC #/AREA URNS AUTO: 71 /HPF (ref 0–2)
SOURCE: ABNORMAL
SP GR UR STRIP: 1.02 (ref 1–1.03)
UROBILINOGEN UR STRIP-MCNC: NORMAL MG/DL (ref 0–2)
WBC #/AREA URNS AUTO: 3 /HPF (ref 0–5)

## 2019-06-18 PROCEDURE — 99213 OFFICE O/P EST LOW 20 MIN: CPT | Performed by: FAMILY MEDICINE

## 2019-06-18 PROCEDURE — 81001 URINALYSIS AUTO W/SCOPE: CPT | Mod: ZL | Performed by: FAMILY MEDICINE

## 2019-06-18 PROCEDURE — G0463 HOSPITAL OUTPT CLINIC VISIT: HCPCS | Performed by: FAMILY MEDICINE

## 2019-06-18 ASSESSMENT — PAIN SCALES - GENERAL: PAINLEVEL: NO PAIN (0)

## 2019-06-18 ASSESSMENT — MIFFLIN-ST. JEOR: SCORE: 1462.9

## 2019-06-18 NOTE — PROGRESS NOTES
"Subjective     Aaron Conner is a 23 year old male who presents to clinic today for the following health issues:    HPI   ED/UC Followup:    [place]  [facility] [place]: [place]/10/19  Reason for visit: uti, kidney pain  Current Status: pelvic and groin discomfort  Had kidney stone - did not strain all urine - did not find or pass it  Has some discomfort under the scrotum and in urethra  Mild changes and feeling but persistant  Same GF for over 2 yrs   No risk for STD  H/o several checks this winter for Gc/Chlam            Current Outpatient Medications   Medication Sig Dispense Refill     ibuprofen (ADVIL/MOTRIN) 800 MG tablet Take 1 tablet (800 mg) by mouth every 8 hours as needed for moderate pain (To be taken with food.) 30 tablet 0         Reviewed and updated as needed this visit by Provider         Review of Systems   ROS COMP: CONSTITUTIONAL: NEGATIVE for fever, chills, change in weight  RESP: NEGATIVE for significant cough or SOB  CV: NEGATIVE for chest pain, palpitations or peripheral edema      Objective    /60   Pulse 88   Temp 98.3  F (36.8  C)   Ht 1.581 m (5' 2.25\")   Wt 59 kg (130 lb)   SpO2 98%   BMI 23.59 kg/m    Body mass index is 23.59 kg/m .  Physical Exam   GENERAL: healthy, alert and no distress  ABDOMEN: soft, nontender, no hepatosplenomegaly, no masses and bowel sounds normal   (male): normal male genitalia without lesions or urethral discharge, no hernia          Results for orders placed or performed in visit on 06/18/19   UA with Microscopic reflex to Culture   Result Value Ref Range    Color Urine Light Yellow     Appearance Urine Clear     Glucose Urine Negative NEG^Negative mg/dL    Bilirubin Urine Negative NEG^Negative    Ketones Urine Negative NEG^Negative mg/dL    Specific Gravity Urine 1.016 1.003 - 1.035    Blood Urine Moderate (A) NEG^Negative    pH Urine 7.5 4.7 - 8.0 pH    Protein Albumin Urine Negative NEG^Negative mg/dL    Urobilinogen mg/dL Normal 0.0 - 2.0 mg/dL "    Nitrite Urine Negative NEG^Negative    Leukocyte Esterase Urine Negative NEG^Negative    Source Midstream Urine     WBC Urine 3 0 - 5 /HPF    RBC Urine 71 (H) 0 - 2 /HPF    Bacteria Urine None (A) NEG^Negative /HPF    Mucous Urine Present (A) NEG^Negative /LPF         Assessment & Plan       ICD-10-CM    1. Dysuria R30.0 UA with Microscopic reflex to Culture   2. Renal colic N23 UA with Microscopic reflex to Culture   Sounds like still passing the stone. Did not strain out the stone yet and with abnormal UA and sx - probably hung up at the [facility].  Push fluids- strain all urine- try to see if tolerates flomax- try again.  F/u in 8-10 days.  Repeat CT if no stone recovered.  Symptomatic treatment was discussed along when patient should call and/or come back into the clinic or go to ER/Urgent care. All questions answered.        Tobacco Cessation:   reports that he has been smoking cigarettes.  He has been smoking about 1.00 pack per day. He has quit using smokeless tobacco. His smokeless tobacco use included chew.              No follow-ups on file.    Osmel Salas MD  [facility] - [place]

## 2019-06-18 NOTE — NURSING NOTE
"Chief Complaint   Patient presents with     UTI       Initial /60   Pulse 88   Temp 98.3  F (36.8  C)   Ht 1.581 m (5' 2.25\")   Wt 59 kg (130 lb)   SpO2 98%   BMI 23.59 kg/m   Estimated body mass index is 23.59 kg/m  as calculated from the following:    Height as of this encounter: 1.581 m (5' 2.25\").    Weight as of this encounter: 59 kg (130 lb).  Medication Reconciliation: complete      "

## 2019-06-19 ENCOUNTER — TELEPHONE (OUTPATIENT)
Dept: FAMILY MEDICINE | Facility: OTHER | Age: 24
End: 2019-06-19

## 2019-06-19 DIAGNOSIS — N23 RENAL COLIC: Primary | ICD-10-CM

## 2019-06-19 DIAGNOSIS — N23 RENAL COLIC: ICD-10-CM

## 2019-06-19 PROCEDURE — 99000 SPECIMEN HANDLING OFFICE-LAB: CPT | Performed by: FAMILY MEDICINE

## 2019-06-19 PROCEDURE — 82365 CALCULUS SPECTROSCOPY: CPT | Mod: ZL

## 2019-06-19 NOTE — TELEPHONE ENCOUNTER
Patient calls stating he passed his stone yesterday. He has the stone thinks was supposed to have this tested, no order is placed. Has an appt is wanting to know if he should still keep this appt. Requesting Dr Salas's nurse to call him back.

## 2019-06-19 NOTE — TELEPHONE ENCOUNTER
If feeling better - can cancel f/u appt.     Order for stone analysis place- pt to bring stone in .

## 2019-06-28 LAB
APPEARANCE STONE: NORMAL
COMPN STONE: NORMAL
NUMBER STONE: 1
SIZE STONE: NORMAL MM
WT STONE: 9 MG

## 2019-07-23 ENCOUNTER — HOSPITAL ENCOUNTER (EMERGENCY)
Facility: HOSPITAL | Age: 24
Discharge: HOME | End: 2019-07-23
Attending: NURSE PRACTITIONER | Admitting: NURSE PRACTITIONER
Payer: MEDICAID

## 2019-07-23 ENCOUNTER — APPOINTMENT (OUTPATIENT)
Dept: GENERAL RADIOLOGY | Facility: HOSPITAL | Age: 24
End: 2019-07-23
Attending: NURSE PRACTITIONER
Payer: MEDICAID

## 2019-07-23 VITALS
SYSTOLIC BLOOD PRESSURE: 136 MMHG | HEART RATE: 66 BPM | RESPIRATION RATE: 16 BRPM | DIASTOLIC BLOOD PRESSURE: 93 MMHG | TEMPERATURE: 97.2 F | OXYGEN SATURATION: 99 %

## 2019-07-23 DIAGNOSIS — R05.3 COUGH, PERSISTENT: ICD-10-CM

## 2019-07-23 DIAGNOSIS — R42 DIZZINESS: Primary | ICD-10-CM

## 2019-07-23 LAB
ANION GAP SERPL CALCULATED.3IONS-SCNC: 3 MMOL/L (ref 3–14)
BASOPHILS # BLD AUTO: 0.1 10E9/L (ref 0–0.2)
BASOPHILS NFR BLD AUTO: 1 %
BUN SERPL-MCNC: 14 MG/DL (ref 7–30)
CALCIUM SERPL-MCNC: 9.3 MG/DL (ref 8.5–10.1)
CHLORIDE SERPL-SCNC: 106 MMOL/L (ref 94–109)
CO2 SERPL-SCNC: 31 MMOL/L (ref 20–32)
CREAT SERPL-MCNC: 1.08 MG/DL (ref 0.66–1.25)
DIFFERENTIAL METHOD BLD: NORMAL
EOSINOPHIL # BLD AUTO: 0.2 10E9/L (ref 0–0.7)
EOSINOPHIL NFR BLD AUTO: 2.9 %
ERYTHROCYTE [DISTWIDTH] IN BLOOD BY AUTOMATED COUNT: 11.9 % (ref 10–15)
GFR SERPL CREATININE-BSD FRML MDRD: >90 ML/MIN/{1.73_M2}
GLUCOSE SERPL-MCNC: 93 MG/DL (ref 70–99)
HCT VFR BLD AUTO: 49 % (ref 40–53)
HGB BLD-MCNC: 17.1 G/DL (ref 13.3–17.7)
IMM GRANULOCYTES # BLD: 0 10E9/L (ref 0–0.4)
IMM GRANULOCYTES NFR BLD: 0.1 %
LYMPHOCYTES # BLD AUTO: 1.7 10E9/L (ref 0.8–5.3)
LYMPHOCYTES NFR BLD AUTO: 24.3 %
MCH RBC QN AUTO: 31 PG (ref 26.5–33)
MCHC RBC AUTO-ENTMCNC: 34.9 G/DL (ref 31.5–36.5)
MCV RBC AUTO: 89 FL (ref 78–100)
MONOCYTES # BLD AUTO: 0.6 10E9/L (ref 0–1.3)
MONOCYTES NFR BLD AUTO: 8.5 %
NEUTROPHILS # BLD AUTO: 4.5 10E9/L (ref 1.6–8.3)
NEUTROPHILS NFR BLD AUTO: 63.2 %
NRBC # BLD AUTO: 0 10*3/UL
NRBC BLD AUTO-RTO: 0 /100
PLATELET # BLD AUTO: 284 10E9/L (ref 150–450)
POTASSIUM SERPL-SCNC: 4.2 MMOL/L (ref 3.4–5.3)
RBC # BLD AUTO: 5.52 10E12/L (ref 4.4–5.9)
SODIUM SERPL-SCNC: 140 MMOL/L (ref 133–144)
WBC # BLD AUTO: 7.2 10E9/L (ref 4–11)

## 2019-07-23 PROCEDURE — 85025 COMPLETE CBC W/AUTO DIFF WBC: CPT | Performed by: NURSE PRACTITIONER

## 2019-07-23 PROCEDURE — G0463 HOSPITAL OUTPT CLINIC VISIT: HCPCS | Mod: 25

## 2019-07-23 PROCEDURE — 93010 ELECTROCARDIOGRAM REPORT: CPT | Performed by: INTERNAL MEDICINE

## 2019-07-23 PROCEDURE — 80048 BASIC METABOLIC PNL TOTAL CA: CPT | Performed by: NURSE PRACTITIONER

## 2019-07-23 PROCEDURE — 93005 ELECTROCARDIOGRAM TRACING: CPT

## 2019-07-23 PROCEDURE — 71046 X-RAY EXAM CHEST 2 VIEWS: CPT | Mod: TC

## 2019-07-23 PROCEDURE — 99215 OFFICE O/P EST HI 40 MIN: CPT | Mod: Z6 | Performed by: NURSE PRACTITIONER

## 2019-07-23 PROCEDURE — 36415 COLL VENOUS BLD VENIPUNCTURE: CPT | Performed by: NURSE PRACTITIONER

## 2019-07-23 RX ORDER — LORATADINE 10 MG/1
10 TABLET ORAL DAILY
COMMUNITY
End: 2020-02-10

## 2019-07-23 ASSESSMENT — ENCOUNTER SYMPTOMS
FACIAL ASYMMETRY: 0
NAUSEA: 0
ARTHRALGIAS: 0
EYE PAIN: 0
RHINORRHEA: 0
SINUS PRESSURE: 0
COUGH: 1
TROUBLE SWALLOWING: 1
NECK PAIN: 0
CHILLS: 1
SORE THROAT: 0
SINUS PAIN: 0
MYALGIAS: 0
WEAKNESS: 0
DIARRHEA: 0
APPETITE CHANGE: 0
PHOTOPHOBIA: 0
SHORTNESS OF BREATH: 1
FEVER: 0
DIZZINESS: 1
DECREASED CONCENTRATION: 0
LIGHT-HEADEDNESS: 1
ABDOMINAL PAIN: 0
VOMITING: 0
CHEST TIGHTNESS: 0
CONFUSION: 0

## 2019-07-23 NOTE — ED AVS SNAPSHOT
[facility]  [address] [place] [id number]  Phone:  [phone]                                    Aaron Conner   MRN: [id number]    Department:  [facility]   Date of Visit:  7/23/2019           After Visit Summary Signature Page    I have received my discharge instructions, and my questions have been answered. I have discussed any challenges I see with this plan with the nurse or doctor.    ..........................................................................................................................................  Patient/Patient Representative Signature      ..........................................................................................................................................  Patient Representative Print Name and Relationship to Patient    ..................................................               ................................................  Date                                   Time    ..........................................................................................................................................  Reviewed by Signature/Title    ...................................................              ..............................................  Date                                               Time          22EPIC Rev 08/18

## 2019-07-23 NOTE — ED TRIAGE NOTES
"Pt presents with shortness of breath, dizziness,cough, feels like his throat and lungs swell when he smokes a cigarette, states he is coughing up\" black stuff\", feels run down for 1 month.  "

## 2019-07-23 NOTE — ED PROVIDER NOTES
"  History     Chief Complaint   Patient presents with     Cough     States chronic cough for over a year which worsened two months ago. States he feels throat drainage and swelling sensation, denies throat pain. States he has felt \"run down\" for two weeks.     HPI  Aaron Conner is a 23 year old male who presents to  c/o dizziness. He reports that over the last 2 weeks he has had dizziness which is worse in the mornings. He describes his dizziness as a lightheaded feeling. He also feels pressure in the back of his head which comes and goes. He has had a cough x 1 year which he attributes to being a cigarette smoker. He states that he has been coughing up brown/black sputum. When he smokes cigarettes he feels like his throat is swelling up and he has trouble breathing and some wheezing. He has been smoking cigarettes since he was 11 years old and currently smokes 1ppd. He also states that he smokes marijuana occasionally. Denies any sinus pressure or pain. He has had a history of sinus infections. He had surgery done on his sinuses in 2015. Occasionally takes claritin. He has recently been taking claritin more regularly since his symptoms started worsening. Denies N/V/D, fevers, body aches, or recent trauma to his head. Denies any history of lung diseases or cardiac history.    Allergies:  No Known Allergies    Problem List:    There are no active problems to display for this patient.       Past Medical History:    Past Medical History:   Diagnosis Date     Allergic rhinitis, cause unspecified 12/10/2010     Anxiety 3/28/2011     Cephalgia 5/14/2012     Chronic tonsillitis 5/14/2012     Peripheral vertigo, unspecified 3/14/2011       Past Surgical History:    Past Surgical History:   Procedure Laterality Date     APPENDECTOMY      appendicitis     foreign  body removed from right long finger  1994     maxillary antrostomy  5/2011    LT       Family History:    Family History   Problem Relation Age of Onset     " Cancer Maternal Grandfather         lung     Cancer Mother         ovarian       Social History:  Marital Status:  Single [1]  Social History     Tobacco Use     Smoking status: Current Every Day Smoker     Packs/day: 1.00     Types: Cigarettes     Smokeless tobacco: Former User     Types: Chew   Substance Use Topics     Alcohol use: Yes     Comment: social     Drug use: No        Medications:      loratadine (CLARITIN) 10 MG tablet       Review of Systems   Constitutional: Positive for chills. Negative for appetite change and fever.   HENT: Positive for trouble swallowing. Negative for congestion, ear pain, postnasal drip, rhinorrhea, sinus pressure, sinus pain, sneezing and sore throat.    Eyes: Negative for photophobia and pain.   Respiratory: Positive for cough and shortness of breath. Negative for chest tightness.    Cardiovascular: Negative for chest pain and leg swelling.   Gastrointestinal: Negative for abdominal pain, diarrhea, nausea and vomiting.   Musculoskeletal: Negative for arthralgias, myalgias and neck pain.   Neurological: Positive for dizziness and light-headedness. Negative for facial asymmetry and weakness.   Psychiatric/Behavioral: Negative for confusion and decreased concentration.   All other systems reviewed and are negative.      Physical Exam   BP: 136/93  Pulse: 66  Temp: 97.2  F (36.2  C)  Resp: 16  SpO2: 99 %      Physical Exam   Constitutional: He is oriented to person, place, and time. He appears well-developed and well-nourished.   HENT:   Head: Normocephalic and atraumatic.   Right Ear: Tympanic membrane, external ear and ear canal normal. Tympanic membrane is not erythematous and not bulging. No middle ear effusion.   Left Ear: Tympanic membrane, external ear and ear canal normal. Tympanic membrane is not erythematous and not bulging.  No middle ear effusion.   Neck: Normal range of motion. Neck supple.   Cardiovascular: Normal rate, regular rhythm and normal heart sounds.    Pulmonary/Chest: Effort normal and breath sounds normal. No respiratory distress. He has no wheezes.   Abdominal: Soft. Bowel sounds are normal. There is no tenderness. There is no guarding.   Lymphadenopathy:     He has no cervical adenopathy.   Neurological: He is alert and oriented to person, place, and time.   Skin: Skin is warm and dry. Capillary refill takes less than 2 seconds.   Psychiatric: He has a normal mood and affect.   Nursing note and vitals reviewed.      ED Course      Procedures               EKG Interpretation:      Interpreted by Skye Plaza  Time reviewed: 1415  Symptoms at time of EKG: dizziness   Rhythm: normal sinus   Rate: 50-60  Axis: Normal  Ectopy: none  Conduction: normal  ST Segments/ T Waves: No ST-T wave changes  Q Waves: none  Comparison to prior: No old EKG available    Clinical Impression: normal EKG      Results for orders placed or performed during the hospital encounter of 07/23/19 (from the past 24 hour(s))   CBC with platelets differential   Result Value Ref Range    WBC 7.2 4.0 - 11.0 10e9/L    RBC Count 5.52 4.4 - 5.9 10e12/L    Hemoglobin 17.1 13.3 - 17.7 g/dL    Hematocrit 49.0 40.0 - 53.0 %    MCV 89 78 - 100 fl    MCH 31.0 26.5 - 33.0 pg    MCHC 34.9 31.5 - 36.5 g/dL    RDW 11.9 10.0 - 15.0 %    Platelet Count 284 150 - 450 10e9/L    Diff Method Automated Method     % Neutrophils 63.2 %    % Lymphocytes 24.3 %    % Monocytes 8.5 %    % Eosinophils 2.9 %    % Basophils 1.0 %    % Immature Granulocytes 0.1 %    Nucleated RBCs 0 0 /100    Absolute Neutrophil 4.5 1.6 - 8.3 10e9/L    Absolute Lymphocytes 1.7 0.8 - 5.3 10e9/L    Absolute Monocytes 0.6 0.0 - 1.3 10e9/L    Absolute Eosinophils 0.2 0.0 - 0.7 10e9/L    Absolute Basophils 0.1 0.0 - 0.2 10e9/L    Abs Immature Granulocytes 0.0 0 - 0.4 10e9/L    Absolute Nucleated RBC 0.0    Basic metabolic panel   Result Value Ref Range    Sodium 140 133 - 144 mmol/L    Potassium 4.2 3.4 - 5.3 mmol/L    Chloride 106 94 - 109  mmol/L    Carbon Dioxide 31 20 - 32 mmol/L    Anion Gap 3 3 - 14 mmol/L    Glucose 93 70 - 99 mg/dL    Urea Nitrogen 14 7 - 30 mg/dL    Creatinine 1.08 0.66 - 1.25 mg/dL    GFR Estimate >90 >60 mL/min/[1.73_m2]    GFR Estimate If Black >90 >60 mL/min/[1.73_m2]    Calcium 9.3 8.5 - 10.1 mg/dL   Chest XR,  PA & LAT    Narrative    PROCEDURE:  XR CHEST 2 VW    HISTORY:  Productive cough x 1 year recently worsened; SOB.     COMPARISON:  None.    FINDINGS:   The cardiac silhouette is normal in size. The pulmonary vasculature is  normal.  The lungs are clear. No pleural effusion or pneumothorax.      Impression    IMPRESSION:  No acute cardiopulmonary disease.      MANGO LARA MD       Medications - No data to display    Assessments & Plan (with Medical Decision Making)   1) Dizziness  EKG normal. CXR nl. CBC and BMP WNL. Heart rate and rhythm regular. Vital signs stable. No signs of electrolyte abnormalities. Denies sensation of room spinning. He is alert and oriented x 3. No signs of dehydration. Encouraged patient to stay hydrated and make sure he is eating well. He has an appointment scheduled with PCP on 8/12/19 - informed him to try and get in sooner if possible. Return to emergency department if symptoms worsen or you develop new concerning symptoms.     2) Chronic cough  Bilateral lung sounds clear. Patient is afebrile with oxygenation 99% on room air. Patient has a history of smoking and was recently seen by his PCP for this cough in May 2019 - antibiotic given at that time and he reported some improvement in his cough. He was encouraged to continue with claritin for possible environmental allergies. Discussed with patient to consider quitting smoking and he can discuss options with his PCP. F/U with PCP as scheduled or sooner if symptoms worsen.    Patient agreeable to plan of care and verbalized understanding.     I have reviewed the nursing notes.    I have reviewed the findings, diagnosis, plan and need  for follow up with the patient.        Medication List      There are no discharge medications for this visit.         Final diagnoses:   Dizziness   Cough, persistent       7/23/2019   [facility]     Mpofu, Prudence, CNP  07/23/19 1740       Mpofu, Prudence, CNP  07/23/19 1746

## 2019-07-23 NOTE — DISCHARGE INSTRUCTIONS
Stay hydrated, drink plenty of water  Keep taking the claritin daily  Try and follow up with your doctor within a week if possible for reevaluation of your dizziness  Return to emergency department if symptoms worsen or you develop new concerning symptoms

## 2019-08-05 ENCOUNTER — HOSPITAL ENCOUNTER (EMERGENCY)
Facility: HOSPITAL | Age: 24
Discharge: HOME | End: 2019-08-05
Attending: PHYSICIAN ASSISTANT | Admitting: PHYSICIAN ASSISTANT
Payer: MEDICAID

## 2019-08-05 ENCOUNTER — TELEPHONE (OUTPATIENT)
Dept: FAMILY MEDICINE | Facility: OTHER | Age: 24
End: 2019-08-05

## 2019-08-05 VITALS
DIASTOLIC BLOOD PRESSURE: 67 MMHG | RESPIRATION RATE: 14 BRPM | SYSTOLIC BLOOD PRESSURE: 112 MMHG | OXYGEN SATURATION: 97 % | TEMPERATURE: 97.6 F

## 2019-08-05 DIAGNOSIS — J01.91 ACUTE RECURRENT SINUSITIS, UNSPECIFIED LOCATION: ICD-10-CM

## 2019-08-05 PROCEDURE — G0463 HOSPITAL OUTPT CLINIC VISIT: HCPCS

## 2019-08-05 PROCEDURE — 99213 OFFICE O/P EST LOW 20 MIN: CPT | Mod: Z6 | Performed by: PHYSICIAN ASSISTANT

## 2019-08-05 RX ORDER — MOMETASONE FUROATE MONOHYDRATE 50 UG/1
2 SPRAY, METERED NASAL DAILY
Qty: 17 G | Refills: 0 | Status: SHIPPED | OUTPATIENT
Start: 2019-08-05 | End: 2019-12-23

## 2019-08-05 NOTE — ED AVS SNAPSHOT
[facility]  [address] [place] [address]  Phone:  [phone]                                    Aaron Conner   MRN: [id number]    Department:  [facility]   Date of Visit:  8/5/2019           After Visit Summary Signature Page    I have received my discharge instructions, and my questions have been answered. I have discussed any challenges I see with this plan with the nurse or doctor.    ..........................................................................................................................................  Patient/Patient Representative Signature      ..........................................................................................................................................  Patient Representative Print Name and Relationship to Patient    ..................................................               ................................................  Date                                   Time    ..........................................................................................................................................  Reviewed by Signature/Title    ...................................................              ..............................................  Date                                               Time          22EPIC Rev 08/18

## 2019-08-05 NOTE — DISCHARGE INSTRUCTIONS
May use Afrin nasal spray (oxymetazoline) two sprays in each nostril twice daily for up to three days.  Wait 15 minutes and try Neti pot or sinus rinse as tolerated or try saline nasal sprays throughout the day.  May take up to 600 mg ibuprofen every 6 hours as needed for pain.

## 2019-08-05 NOTE — ED PROVIDER NOTES
History     Chief Complaint   Patient presents with     Sinus Problem     states he has had a sinus infection for a couple of weeks and has not been treated.      HPI  Aaron Conner is a 23 year old male who presents with complaints of nasal and sinus congestion, drainage and pain with cough for 30 days with associated head pressure, recent cough.  Denies fever, shortness of breath.  Has been taking OTC medications with minimal relief. History of recurrent sinusitis with prior sinus surgery.  Has ENT f/u scheduled for later in the month.       Allergies:  No Known Allergies    Problem List:    There are no active problems to display for this patient.       Past Medical History:    Past Medical History:   Diagnosis Date     Allergic rhinitis, cause unspecified 12/10/2010     Anxiety 3/28/2011     Cephalgia 5/14/2012     Chronic tonsillitis 5/14/2012     Peripheral vertigo, unspecified 3/14/2011       Social History:  Marital Status:  Single [1]  Social History     Tobacco Use     Smoking status: Current Every Day Smoker     Packs/day: 1.00     Types: Cigarettes     Smokeless tobacco: Former User     Types: Chew   Substance Use Topics     Alcohol use: Yes     Comment: social     Drug use: No        Medications:      amoxicillin-clavulanate (AUGMENTIN) 875-125 MG tablet   mometasone (NASONEX) 50 MCG/ACT nasal spray   loratadine (CLARITIN) 10 MG tablet         Review of Systems :  Constitutional: Negative for fever.   HENT: Positive for congestion and sinus pain and pressure. Negative for sore throat.    Respiratory: Positive for cough. Negative for shortness of breath.      Physical Exam   BP: 112/67  Heart Rate: 85  Temp: 97.6  F (36.4  C)  Resp: 14  SpO2: 97 %      Physical Exam   Constitutional: Well-developed and well-nourished.   Head: Normocephalic and atraumatic.   Eyes: Conjunctivae and EOM are normal.  KAI.  Ears: TMs normal bilaterally  Nose: Congested with no sinus tenderness  Throat: no erythema or  significant tonsillar swelling  Neck: Normal range of motion. No palpable lymphadenopathy  Cardiovascular: Normal rate and regular rhythm.   Pulmonary/Chest: Effort normal and CTAB.  Skin: Skin is warm and dry. No rash noted.     ED Course               No results found for this or any previous visit (from the past 24 hour(s)).    Medications - No data to display    Assessments & Plan (with Medical Decision Making)     I have reviewed the nursing notes.    I have reviewed the findings, diagnosis, plan and need for follow up with the patient.  May use Afrin nasal spray (oxymetazoline) two sprays in each nostril twice daily for up to three days.  Wait 15 minutes and try Neti pot or sinus rinse as tolerated or try saline nasal sprays throughout the day.  May take up to 600 mg ibuprofen every 6 hours as needed for pain.        Medication List      Started    amoxicillin-clavulanate 875-125 MG tablet  Commonly known as:  AUGMENTIN  1 tablet, Oral, 2 TIMES DAILY     mometasone 50 MCG/ACT nasal spray  Commonly known as:  NASONEX  2 sprays, Both Nostrils, DAILY            Final diagnoses:   Acute recurrent sinusitis, unspecified location       ADRIAN Grayson on 8/5/2019 at 2:59 PM   8/5/2019   [facility]          Kan Day PA  08/05/19 6735

## 2019-08-05 NOTE — TELEPHONE ENCOUNTER
Patient wanting to be seen for sinus infection, offered appointment for tomorrow but he may go into urgent care tonight.

## 2019-08-06 ENCOUNTER — TELEPHONE (OUTPATIENT)
Dept: FAMILY MEDICINE | Facility: OTHER | Age: 24
End: 2019-08-06

## 2019-08-06 NOTE — TELEPHONE ENCOUNTER
"Patient did not want to answer questions. Stated \"I have a follow up with my specialist\" and did not want to discuss things further and ended the call.     Reason for ER/UC visit: sinusitis  Can you explain to me in your own words what your main problem/concern is for your recent ER visit?: sinus issues    What did the medical team at the hospital tell you to watch out for to make sure you're ok?: no    Do you have any questions for me regarding your main diagnosis? Is there anything I can better explain to you?: NA    New or Worsening symptoms:  symptoms getting better    Prescription Received/Picked up from Pharmacy?: picked up meds   Any questions regarding your prescription?: NA    Follow-up Results or Labs that are pending: NA    Do you have any questions or concerns?: JOSE    ER Recommends Follow-up By: NA    RN Recommendations: JOSE    Thank you for your time, if you start feeling worse, or have any further questions, please feel free to contact us again at [phone].  If needing immediate medical attention at any time please call 911/Go to the ER.     "

## 2019-08-21 ENCOUNTER — TELEPHONE (OUTPATIENT)
Dept: FAMILY MEDICINE | Facility: OTHER | Age: 24
End: 2019-08-21

## 2019-08-21 ENCOUNTER — HOSPITAL ENCOUNTER (EMERGENCY)
Facility: HOSPITAL | Age: 24
Discharge: HOME | End: 2019-08-21
Attending: PHYSICIAN ASSISTANT | Admitting: PHYSICIAN ASSISTANT
Payer: MEDICAID

## 2019-08-21 VITALS
TEMPERATURE: 97.3 F | SYSTOLIC BLOOD PRESSURE: 131 MMHG | DIASTOLIC BLOOD PRESSURE: 82 MMHG | RESPIRATION RATE: 14 BRPM | OXYGEN SATURATION: 99 % | HEART RATE: 90 BPM

## 2019-08-21 DIAGNOSIS — R10.9 BILATERAL FLANK PAIN: ICD-10-CM

## 2019-08-21 LAB
ALBUMIN UR-MCNC: NEGATIVE MG/DL
ANION GAP SERPL CALCULATED.3IONS-SCNC: 4 MMOL/L (ref 3–14)
APPEARANCE UR: CLEAR
BASOPHILS # BLD AUTO: 0.1 10E9/L (ref 0–0.2)
BASOPHILS NFR BLD AUTO: 0.7 %
BILIRUB UR QL STRIP: NEGATIVE
BUN SERPL-MCNC: 16 MG/DL (ref 7–30)
C TRACH DNA SPEC QL PROBE+SIG AMP: NOT DETECTED
CALCIUM SERPL-MCNC: 9.1 MG/DL (ref 8.5–10.1)
CHLORIDE SERPL-SCNC: 105 MMOL/L (ref 94–109)
CO2 SERPL-SCNC: 30 MMOL/L (ref 20–32)
COLOR UR AUTO: NORMAL
CREAT SERPL-MCNC: 1.04 MG/DL (ref 0.66–1.25)
DIFFERENTIAL METHOD BLD: NORMAL
EOSINOPHIL # BLD AUTO: 0.1 10E9/L (ref 0–0.7)
EOSINOPHIL NFR BLD AUTO: 1.4 %
ERYTHROCYTE [DISTWIDTH] IN BLOOD BY AUTOMATED COUNT: 11.9 % (ref 10–15)
GFR SERPL CREATININE-BSD FRML MDRD: >90 ML/MIN/{1.73_M2}
GLUCOSE SERPL-MCNC: 97 MG/DL (ref 70–99)
GLUCOSE UR STRIP-MCNC: NEGATIVE MG/DL
HCT VFR BLD AUTO: 45.5 % (ref 40–53)
HGB BLD-MCNC: 16.1 G/DL (ref 13.3–17.7)
HGB UR QL STRIP: NEGATIVE
IMM GRANULOCYTES # BLD: 0 10E9/L (ref 0–0.4)
IMM GRANULOCYTES NFR BLD: 0.3 %
KETONES UR STRIP-MCNC: NEGATIVE MG/DL
LEUKOCYTE ESTERASE UR QL STRIP: NEGATIVE
LYMPHOCYTES # BLD AUTO: 1.7 10E9/L (ref 0.8–5.3)
LYMPHOCYTES NFR BLD AUTO: 22.7 %
MCH RBC QN AUTO: 31.1 PG (ref 26.5–33)
MCHC RBC AUTO-ENTMCNC: 35.4 G/DL (ref 31.5–36.5)
MCV RBC AUTO: 88 FL (ref 78–100)
MONOCYTES # BLD AUTO: 0.7 10E9/L (ref 0–1.3)
MONOCYTES NFR BLD AUTO: 9 %
N GONORRHOEA DNA SPEC QL PROBE+SIG AMP: NOT DETECTED
NEUTROPHILS # BLD AUTO: 4.8 10E9/L (ref 1.6–8.3)
NEUTROPHILS NFR BLD AUTO: 65.9 %
NITRATE UR QL: NEGATIVE
NRBC # BLD AUTO: 0 10*3/UL
NRBC BLD AUTO-RTO: 0 /100
PH UR STRIP: 7 PH (ref 4.7–8)
PLATELET # BLD AUTO: 248 10E9/L (ref 150–450)
POTASSIUM SERPL-SCNC: 3.7 MMOL/L (ref 3.4–5.3)
RBC # BLD AUTO: 5.18 10E12/L (ref 4.4–5.9)
SODIUM SERPL-SCNC: 139 MMOL/L (ref 133–144)
SOURCE: NORMAL
SP GR UR STRIP: 1 (ref 1–1.03)
SPECIMEN SOURCE: NORMAL
UROBILINOGEN UR STRIP-MCNC: NORMAL MG/DL (ref 0–2)
WBC # BLD AUTO: 7.3 10E9/L (ref 4–11)

## 2019-08-21 PROCEDURE — 87491 CHLMYD TRACH DNA AMP PROBE: CPT | Performed by: PHYSICIAN ASSISTANT

## 2019-08-21 PROCEDURE — 81003 URINALYSIS AUTO W/O SCOPE: CPT | Performed by: PHYSICIAN ASSISTANT

## 2019-08-21 PROCEDURE — 85025 COMPLETE CBC W/AUTO DIFF WBC: CPT | Performed by: PHYSICIAN ASSISTANT

## 2019-08-21 PROCEDURE — 99283 EMERGENCY DEPT VISIT LOW MDM: CPT

## 2019-08-21 PROCEDURE — 87591 N.GONORRHOEAE DNA AMP PROB: CPT | Performed by: PHYSICIAN ASSISTANT

## 2019-08-21 PROCEDURE — 80048 BASIC METABOLIC PNL TOTAL CA: CPT | Performed by: PHYSICIAN ASSISTANT

## 2019-08-21 PROCEDURE — 36415 COLL VENOUS BLD VENIPUNCTURE: CPT | Performed by: PHYSICIAN ASSISTANT

## 2019-08-21 PROCEDURE — 99283 EMERGENCY DEPT VISIT LOW MDM: CPT | Mod: Z6 | Performed by: PHYSICIAN ASSISTANT

## 2019-08-21 ASSESSMENT — ENCOUNTER SYMPTOMS
FLANK PAIN: 1
DYSURIA: 0
FEVER: 0
GASTROINTESTINAL NEGATIVE: 1
FREQUENCY: 0
HEMATURIA: 0

## 2019-08-21 NOTE — TELEPHONE ENCOUNTER
Pt calls, was in ER today    Had lab drawn    Looking for results    Results given per RN triage nurse, Yen, all labs normal    Calista Rosado LPN

## 2019-08-21 NOTE — ED PROVIDER NOTES
History     Chief Complaint   Patient presents with     Flank Pain     3-4 days bilateral. states he has hx of bilateral kidney stones. some difficulty with urinating. dark colored urine a couple of days ago.      HPI  Aaron Conner is a 23 year old male who presents emergency department with complaints of 4 days of bilateral flank pain.  Patient reports that currently the pain is not very bad.  He denies recent fever, dysuria, frequency, urgency.  Patient does have a history of kidney stones.  He notes that he has had some mild difficulty with urinating recently with dark urine within the last couple days.  He denies significant abdominal pain.  He has had multiple partners and has had 2 partners since the last time he was checked for gonorrhea and chlamydia.    Allergies:  No Known Allergies    Problem List:    There are no active problems to display for this patient.       Past Medical History:    Past Medical History:   Diagnosis Date     Allergic rhinitis, cause unspecified 12/10/2010     Anxiety 3/28/2011     Cephalgia 5/14/2012     Chronic tonsillitis 5/14/2012     Peripheral vertigo, unspecified 3/14/2011       Past Surgical History:    Past Surgical History:   Procedure Laterality Date     APPENDECTOMY      appendicitis     foreign  body removed from right long finger  1994     maxillary antrostomy  5/2011    LT       Family History:    Family History   Problem Relation Age of Onset     Cancer Maternal Grandfather         lung     Cancer Mother         ovarian       Social History:  Marital Status:  Single [1]  Social History     Tobacco Use     Smoking status: Current Every Day Smoker     Packs/day: 1.00     Types: Cigarettes     Smokeless tobacco: Former User     Types: Chew   Substance Use Topics     Alcohol use: Yes     Comment: social     Drug use: No        Medications:      loratadine (CLARITIN) 10 MG tablet   mometasone (NASONEX) 50 MCG/ACT nasal spray         Review of Systems   Constitutional:  Negative for fever.   Gastrointestinal: Negative.    Genitourinary: Positive for flank pain. Negative for discharge, dysuria, frequency, hematuria and urgency.       Physical Exam   BP: 121/74  Pulse: 90  Temp: 97.3  F (36.3  C)  Resp: 14  SpO2: 100 %      Physical Exam   Constitutional: He is oriented to person, place, and time. He appears well-developed and well-nourished. No distress.   HENT:   Head: Normocephalic and atraumatic.   Eyes: No scleral icterus.   Neck: Normal range of motion. Neck supple.   Pulmonary/Chest: Effort normal.   Abdominal: Soft. Bowel sounds are normal. There is no tenderness. There is no CVA tenderness.   Neurological: He is alert and oriented to person, place, and time.   Skin: Skin is warm and dry. No rash noted. He is not diaphoretic.       ED Course        Procedures  No blood in urine.  Last CT in June noted punctate stones in bilateral kidneys.  Abdominal exam is unremarkable.  G/C pending.  Recommended observe for worsening pain or new onset fever and return to ED if necessary.               Results for orders placed or performed during the hospital encounter of 08/21/19 (from the past 24 hour(s))   UA reflex to Microscopic and Culture   Result Value Ref Range    Color Urine Straw     Appearance Urine Clear     Glucose Urine Negative NEG^Negative mg/dL    Bilirubin Urine Negative NEG^Negative    Ketones Urine Negative NEG^Negative mg/dL    Specific Gravity Urine 1.003 1.003 - 1.035    Blood Urine Negative NEG^Negative    pH Urine 7.0 4.7 - 8.0 pH    Protein Albumin Urine Negative NEG^Negative mg/dL    Urobilinogen mg/dL Normal 0.0 - 2.0 mg/dL    Nitrite Urine Negative NEG^Negative    Leukocyte Esterase Urine Negative NEG^Negative    Source Midstream Urine    Basic metabolic panel   Result Value Ref Range    Sodium 139 133 - 144 mmol/L    Potassium 3.7 3.4 - 5.3 mmol/L    Chloride 105 94 - 109 mmol/L    Carbon Dioxide 30 20 - 32 mmol/L    Anion Gap 4 3 - 14 mmol/L    Glucose 97 70  - 99 mg/dL    Urea Nitrogen 16 7 - 30 mg/dL    Creatinine 1.04 0.66 - 1.25 mg/dL    GFR Estimate >90 >60 mL/min/[1.73_m2]    GFR Estimate If Black >90 >60 mL/min/[1.73_m2]    Calcium 9.1 8.5 - 10.1 mg/dL   CBC with platelets differential   Result Value Ref Range    WBC 7.3 4.0 - 11.0 10e9/L    RBC Count 5.18 4.4 - 5.9 10e12/L    Hemoglobin 16.1 13.3 - 17.7 g/dL    Hematocrit 45.5 40.0 - 53.0 %    MCV 88 78 - 100 fl    MCH 31.1 26.5 - 33.0 pg    MCHC 35.4 31.5 - 36.5 g/dL    RDW 11.9 10.0 - 15.0 %    Platelet Count 248 150 - 450 10e9/L    Diff Method Automated Method     % Neutrophils 65.9 %    % Lymphocytes 22.7 %    % Monocytes 9.0 %    % Eosinophils 1.4 %    % Basophils 0.7 %    % Immature Granulocytes 0.3 %    Nucleated RBCs 0 0 /100    Absolute Neutrophil 4.8 1.6 - 8.3 10e9/L    Absolute Lymphocytes 1.7 0.8 - 5.3 10e9/L    Absolute Monocytes 0.7 0.0 - 1.3 10e9/L    Absolute Eosinophils 0.1 0.0 - 0.7 10e9/L    Absolute Basophils 0.1 0.0 - 0.2 10e9/L    Abs Immature Granulocytes 0.0 0 - 0.4 10e9/L    Absolute Nucleated RBC 0.0        Medications - No data to display    Assessments & Plan (with Medical Decision Making)     I have reviewed the nursing notes.    I have reviewed the findings, diagnosis, plan and need for follow up with the patient.    Current Discharge Medication List          Final diagnoses:   Bilateral flank pain     ADRIAN Grayson on 8/21/2019 at 2:54 PM   8/21/2019   [facility]     Kan Day PA  08/21/19 8619

## 2019-08-21 NOTE — ED AVS SNAPSHOT
[facility]  [address] [place] [address]  Phone:  [phone]                                    Aaron Conner   MRN: [id number]    Department:  [facility]   Date of Visit:  8/21/2019           After Visit Summary Signature Page    I have received my discharge instructions, and my questions have been answered. I have discussed any challenges I see with this plan with the nurse or doctor.    ..........................................................................................................................................  Patient/Patient Representative Signature      ..........................................................................................................................................  Patient Representative Print Name and Relationship to Patient    ..................................................               ................................................  Date                                   Time    ..........................................................................................................................................  Reviewed by Signature/Title    ...................................................              ..............................................  Date                                               Time          22EPIC Rev 08/18

## 2019-08-22 ENCOUNTER — TELEPHONE (OUTPATIENT)
Dept: FAMILY MEDICINE | Facility: OTHER | Age: 24
End: 2019-08-22

## 2019-08-23 ENCOUNTER — HOSPITAL ENCOUNTER (EMERGENCY)
Facility: HOSPITAL | Age: 24
Discharge: HOME | End: 2019-08-23
Attending: PHYSICIAN ASSISTANT | Admitting: PHYSICIAN ASSISTANT
Payer: MEDICAID

## 2019-08-23 VITALS
SYSTOLIC BLOOD PRESSURE: 117 MMHG | DIASTOLIC BLOOD PRESSURE: 71 MMHG | BODY MASS INDEX: 24.49 KG/M2 | TEMPERATURE: 96.5 F | OXYGEN SATURATION: 100 % | RESPIRATION RATE: 16 BRPM | WEIGHT: 135 LBS

## 2019-08-23 DIAGNOSIS — N48.89 PENILE PAIN: ICD-10-CM

## 2019-08-23 LAB
ALBUMIN UR-MCNC: NEGATIVE MG/DL
APPEARANCE UR: CLEAR
BILIRUB UR QL STRIP: NEGATIVE
COLOR UR AUTO: NORMAL
GLUCOSE UR STRIP-MCNC: NEGATIVE MG/DL
HGB UR QL STRIP: NEGATIVE
KETONES UR STRIP-MCNC: NEGATIVE MG/DL
LEUKOCYTE ESTERASE UR QL STRIP: NEGATIVE
NITRATE UR QL: NEGATIVE
PH UR STRIP: 7 PH (ref 4.7–8)
SOURCE: NORMAL
SP GR UR STRIP: 1.01 (ref 1–1.03)
UROBILINOGEN UR STRIP-MCNC: NORMAL MG/DL (ref 0–2)

## 2019-08-23 PROCEDURE — G0463 HOSPITAL OUTPT CLINIC VISIT: HCPCS

## 2019-08-23 PROCEDURE — 99213 OFFICE O/P EST LOW 20 MIN: CPT | Mod: Z6 | Performed by: PHYSICIAN ASSISTANT

## 2019-08-23 PROCEDURE — 81003 URINALYSIS AUTO W/O SCOPE: CPT | Performed by: PHYSICIAN ASSISTANT

## 2019-08-23 NOTE — ED AVS SNAPSHOT
[facility]  [address] [place] [address]  Phone:  [phone]                                    Aaron Conner   MRN: [id number]    Department:  [facility]   Date of Visit:  8/23/2019           After Visit Summary Signature Page    I have received my discharge instructions, and my questions have been answered. I have discussed any challenges I see with this plan with the nurse or doctor.    ..........................................................................................................................................  Patient/Patient Representative Signature      ..........................................................................................................................................  Patient Representative Print Name and Relationship to Patient    ..................................................               ................................................  Date                                   Time    ..........................................................................................................................................  Reviewed by Signature/Title    ...................................................              ..............................................  Date                                               Time          22EPIC Rev 08/18

## 2019-08-23 NOTE — ED NOTES
Pt states he has a burning sensation in his penis. Denies the burning with urination. States it comes and goes. Complains of burning and discomfort that started 3-4 days ago after his penis was bent.

## 2019-08-23 NOTE — ED PROVIDER NOTES
History     Chief Complaint   Patient presents with     Dysuria     HPI  Aaron Conner is a 23 year old male who presents with complaints of penile pain that has been present since penis was bent since having intercourse several days ago.  He denies pain with urination, blood in urine.  He has not had an erection since the incident but states the penis went straight again after it was bent.  Denies penile discharge or any physical sign of injury.  Was tested for G/C three days ago and was negative.    Allergies:  No Known Allergies    Problem List:    There are no active problems to display for this patient.       Past Medical History:    Past Medical History:   Diagnosis Date     Allergic rhinitis, cause unspecified 12/10/2010     Anxiety 3/28/2011     Cephalgia 5/14/2012     Chronic tonsillitis 5/14/2012     Peripheral vertigo, unspecified 3/14/2011       Social History:  Marital Status:  Single [1]  Social History     Tobacco Use     Smoking status: Current Every Day Smoker     Packs/day: 1.00     Types: Cigarettes     Smokeless tobacco: Former User     Types: Chew   Substance Use Topics     Alcohol use: Yes     Comment: social     Drug use: No        Medications:      loratadine (CLARITIN) 10 MG tablet   mometasone (NASONEX) 50 MCG/ACT nasal spray         Review of Systems :  : negative for Dysuria.  Positive for penile pain.    Abd: Negative for abdominal pain.      Physical Exam   BP: 117/71  Heart Rate: 75  Temp: 96.5  F (35.8  C)  Resp: 16  Weight: 61.2 kg (135 lb)  SpO2: 100 %    Physical Exam   Constitutional: Well-developed and well-nourished.   Head: Normocephalic and atraumatic.   Eyes: Conjunctivae and EOM are normal. Pupils are equal, round, and reactive to light.   Neck: Normal range of motion.   Pulmonary/Chest: Effort normal  Skin: Skin is warm and dry. No rash noted.   Neuro: Gait normal.    Penis: Exam deferred.      ED Course               Results for orders placed or performed during the  hospital encounter of 08/23/19 (from the past 24 hour(s))   UA reflex to Microscopic and Culture   Result Value Ref Range    Color Urine Straw     Appearance Urine Clear     Glucose Urine Negative NEG^Negative mg/dL    Bilirubin Urine Negative NEG^Negative    Ketones Urine Negative NEG^Negative mg/dL    Specific Gravity Urine 1.008 1.003 - 1.035    Blood Urine Negative NEG^Negative    pH Urine 7.0 4.7 - 8.0 pH    Protein Albumin Urine Negative NEG^Negative mg/dL    Urobilinogen mg/dL Normal 0.0 - 2.0 mg/dL    Nitrite Urine Negative NEG^Negative    Leukocyte Esterase Urine Negative NEG^Negative    Source Midstream Urine        Medications - No data to display    Assessments & Plan (with Medical Decision Making)     I have reviewed the nursing notes.    I have reviewed the findings, diagnosis, plan and need for follow up with the patient.  Recommended ibuprofen for pain relief.  No evidence based on description of penile fracture or injury to the urethra.  Referral to urology provided if pain persists.  Return if severely worsening symptoms.         Medication List      There are no discharge medications for this visit.         Final diagnoses:   Penile pain       ADRIAN Grayson on 8/23/2019 at 12:10 PM   8/23/2019   [facility]       Kan Day PA  08/23/19 9538

## 2019-08-26 ENCOUNTER — HOSPITAL ENCOUNTER (EMERGENCY)
Facility: HOSPITAL | Age: 24
Discharge: HOME | End: 2019-08-26
Attending: EMERGENCY MEDICINE | Admitting: EMERGENCY MEDICINE
Payer: MEDICAID

## 2019-08-26 VITALS
DIASTOLIC BLOOD PRESSURE: 79 MMHG | RESPIRATION RATE: 14 BRPM | OXYGEN SATURATION: 98 % | TEMPERATURE: 98.1 F | SYSTOLIC BLOOD PRESSURE: 118 MMHG

## 2019-08-26 DIAGNOSIS — R10.2 PERINEAL PAIN IN MALE: ICD-10-CM

## 2019-08-26 PROCEDURE — 99282 EMERGENCY DEPT VISIT SF MDM: CPT | Mod: Z6 | Performed by: EMERGENCY MEDICINE

## 2019-08-26 PROCEDURE — 99281 EMR DPT VST MAYX REQ PHY/QHP: CPT

## 2019-08-26 NOTE — ED AVS SNAPSHOT
[facility]  [address] [place] [address]  Phone:  [phone]                                    Aaron Conner   MRN: [id number]    Department:  [facility]   Date of Visit:  8/26/2019           After Visit Summary Signature Page    I have received my discharge instructions, and my questions have been answered. I have discussed any challenges I see with this plan with the nurse or doctor.    ..........................................................................................................................................  Patient/Patient Representative Signature      ..........................................................................................................................................  Patient Representative Print Name and Relationship to Patient    ..................................................               ................................................  Date                                   Time    ..........................................................................................................................................  Reviewed by Signature/Title    ...................................................              ..............................................  Date                                               Time          22EPIC Rev 08/18

## 2019-08-26 NOTE — ED NOTES
Patient in today with complaints of testicular pain which he says he was in about a week ago and states he has been tested for STI's and was negative. States it's different now, the pain is behind the scrotum to his butt. States he can't see it but it hurts and states there's a little tiny bump he could feel. States it feels like a pulled muscle feeling.

## 2019-08-28 ENCOUNTER — OFFICE VISIT (OUTPATIENT)
Dept: UROLOGY | Facility: OTHER | Age: 24
End: 2019-08-28
Attending: PHYSICIAN ASSISTANT
Payer: MEDICAID

## 2019-08-28 VITALS
WEIGHT: 130 LBS | HEIGHT: 65 IN | OXYGEN SATURATION: 98 % | BODY MASS INDEX: 21.66 KG/M2 | SYSTOLIC BLOOD PRESSURE: 110 MMHG | TEMPERATURE: 98 F | DIASTOLIC BLOOD PRESSURE: 60 MMHG | HEART RATE: 80 BPM

## 2019-08-28 DIAGNOSIS — N50.819 TESTICULAR PAIN: Primary | ICD-10-CM

## 2019-08-28 DIAGNOSIS — N48.89 PENILE PAIN: ICD-10-CM

## 2019-08-28 PROCEDURE — 99203 OFFICE O/P NEW LOW 30 MIN: CPT | Performed by: UROLOGY

## 2019-08-28 PROCEDURE — G0463 HOSPITAL OUTPT CLINIC VISIT: HCPCS

## 2019-08-28 ASSESSMENT — MIFFLIN-ST. JEOR: SCORE: 1506.56

## 2019-08-28 ASSESSMENT — PAIN SCALES - GENERAL: PAINLEVEL: NO PAIN (0)

## 2019-08-28 NOTE — NURSING NOTE
"Chief Complaint   Patient presents with     Consult     Penile pain       Initial /60 (BP Location: Left arm, Patient Position: Chair, Cuff Size: Adult Regular)   Pulse 80   Temp 98  F (36.7  C) (Tympanic)   Ht 1.651 m (5' 5\")   Wt 59 kg (130 lb)   SpO2 98%   BMI 21.63 kg/m   Estimated body mass index is 21.63 kg/m  as calculated from the following:    Height as of this encounter: 1.651 m (5' 5\").    Weight as of this encounter: 59 kg (130 lb).  Medication Reconciliation: complete   BRANDIN CARRILLO LPN    Review of Systems:    Weight loss:    No     Recent fever/chills:  No   Night sweats:   No  Current skin rash:  No   Recent hair loss:  No  Heat intolerance:  No   Cold intolerance:  No  Chest pain:   No   Palpitations:   No  Shortness of breath:  No   Wheezing:   No  Constipation:    No   Diarrhea:   No   Nausea:   No   Vomiting:   No   Kidney/side pain:  No   Back pain:   No  Frequent headaches:  No   Dizziness:     No  Leg swelling:   No   Calf pain:    No    Parents, brothers or sisters with history of kidney cancer:   No  Parents, brothers or sisters with history of bladder cancer: No  BRANDIN CARRILLO LPN      "

## 2019-08-28 NOTE — PROGRESS NOTES
Type of Visit  NPV    Chief Complaint  Testicular pain    HPI  Mr. Conner is a 23 year old male who presents with intermittent and right testicular pain.    Pain-associated HPI  Quality:    Dull  Provocative factors:  Activity makes it worse  Palliative factors:   Rest makes it better  Radiation:   None  Onset:    1 week ago  Previous tx:  None    Current LUTS?  No  History of vasectomy? No  History of kidney stones? Yes  History of recent injury? No  History of constipation? No     The pain started 1 week ago following a traumatic sexual encounter.  He describes bending of the penis but denies bruising or any signs or symptoms consistent with penile fracture.  Since the event he has spontaneously achieved multiple erections without issue.      Past Medical History  He  has a past medical history of Allergic rhinitis, cause unspecified (12/10/2010), Anxiety (3/28/2011), Cephalgia (5/14/2012), Chronic tonsillitis (5/14/2012), and Peripheral vertigo, unspecified (3/14/2011).  There is no problem list on file for this patient.      Past Surgical History  He  has a past surgical history that includes appendectomy; maxillary antrostomy (5/2011); and foreign  body removed from right long finger (1994).    Medications  He has a current medication list which includes the following prescription(s): loratadine and mometasone.    Allergies  No Known Allergies    Social History  He  reports that he has been smoking cigarettes.  He has been smoking about 1.00 pack per day. He has quit using smokeless tobacco. His smokeless tobacco use included chew. He reports that he drinks alcohol. He reports that he does not use drugs.  No drug abuse.    Family History  Family History   Problem Relation Age of Onset     Cancer Maternal Grandfather         lung     Cancer Mother         ovarian       Review of Systems  I personally reviewed the ROS with the patient.    Nursing Notes:   Yessi Robert LPN  8/28/2019  1:42 PM  Signed  Chief  "Complaint   Patient presents with     Consult     Penile pain       Initial /60 (BP Location: Left arm, Patient Position: Chair, Cuff Size: Adult Regular)   Pulse 80   Temp 98  F (36.7  C) (Tympanic)   Ht 1.651 m (5' 5\")   Wt 59 kg (130 lb)   SpO2 98%   BMI 21.63 kg/m    Estimated body mass index is 21.63 kg/m  as calculated from the following:    Height as of this encounter: 1.651 m (5' 5\").    Weight as of this encounter: 59 kg (130 lb).  Medication Reconciliation: complete   BRANDIN CARRILLO LPN    Review of Systems:    Weight loss:    No     Recent fever/chills:  No   Night sweats:   No  Current skin rash:  No   Recent hair loss:  No  Heat intolerance:  No   Cold intolerance:  No  Chest pain:   No   Palpitations:   No  Shortness of breath:  No   Wheezing:   No  Constipation:    No   Diarrhea:   No   Nausea:   No   Vomiting:   No   Kidney/side pain:  No   Back pain:   No  Frequent headaches:  No   Dizziness:     No  Leg swelling:   No   Calf pain:    No    Parents, brothers or sisters with history of kidney cancer:   No  Parents, brothers or sisters with history of bladder cancer: No  BRANDIN CARRILLO LPN        Physical Exam  Vitals:    08/28/19 1323   BP: 110/60   BP Location: Left arm   Patient Position: Chair   Cuff Size: Adult Regular   Pulse: 80   Temp: 98  F (36.7  C)   TempSrc: Tympanic   SpO2: 98%   Weight: 59 kg (130 lb)   Height: 1.651 m (5' 5\")     Constitutional: No acute distress.  Alert and cooperative   Head: NCAT  Eyes: Conjunctivae normal  Cardiovascular: Regular rate.  Pulmonary/Chest: Respirations are even and non-labored bilaterally, no audible wheezing  Abdominal: Soft. No distension, tenderness, masses or guarding.   Neurological: A + O x 3.  Cranial Nerves II-XII grossly intact.  Extremities: ISABELLA x 4, Warm. No clubbing.  No cyanosis.    Skin: Pink, warm and dry.  No visible rashes noted.  Psychiatric:  Normal mood and affect  Back:  No left CVA tenderness.  No right CVA " tenderness.  Genitourinary:  Nonpalpable bladder  Normal male phallus without discharge or lesions, normal pubic hair distribution.  Testicles descended bilaterally.    Labs  Results for orders placed or performed during the hospital encounter of 19   UA reflex to Microscopic and Culture   Result Value Ref Range    Color Urine Straw     Appearance Urine Clear     Glucose Urine Negative NEG^Negative mg/dL    Bilirubin Urine Negative NEG^Negative    Ketones Urine Negative NEG^Negative mg/dL    Specific Gravity Urine 1.008 1.003 - 1.035    Blood Urine Negative NEG^Negative    pH Urine 7.0 4.7 - 8.0 pH    Protein Albumin Urine Negative NEG^Negative mg/dL    Urobilinogen mg/dL Normal 0.0 - 2.0 mg/dL    Nitrite Urine Negative NEG^Negative    Leukocyte Esterase Urine Negative NEG^Negative    Source Midstream Urine      Imaging  CT Stone  6/10/2019  IMPRESSION:    Mild left hydroureteronephrosis secondary to a 3 mm stone in the mid  left ureter    Assessment  Mr. Conner is a 23 year old male who presents with chronic right testicular pain.    We discussed different strategies for management including the followin.  Conservative therapy vs  2.  Cord denervation vs  3.  Orchiectomy    Plan  Scrotal support continuously, especially with increased activity.  Warm bath soaks twice daily.  NSAIDs for pain as instructed with food.

## 2019-08-29 ENCOUNTER — HOSPITAL ENCOUNTER (EMERGENCY)
Facility: HOSPITAL | Age: 24
Discharge: HOME | End: 2019-08-29
Attending: NURSE PRACTITIONER | Admitting: NURSE PRACTITIONER
Payer: MEDICAID

## 2019-08-29 VITALS
RESPIRATION RATE: 18 BRPM | SYSTOLIC BLOOD PRESSURE: 126 MMHG | OXYGEN SATURATION: 100 % | HEART RATE: 120 BPM | DIASTOLIC BLOOD PRESSURE: 75 MMHG

## 2019-08-29 DIAGNOSIS — Z20.2 EXPOSURE TO STD: Primary | ICD-10-CM

## 2019-08-29 LAB
ALBUMIN UR-MCNC: 10 MG/DL
AMORPH CRY #/AREA URNS HPF: ABNORMAL /HPF
APPEARANCE UR: ABNORMAL
BACTERIA #/AREA URNS HPF: ABNORMAL /HPF
BILIRUB UR QL STRIP: NEGATIVE
COLOR UR AUTO: YELLOW
GLUCOSE UR STRIP-MCNC: NEGATIVE MG/DL
HGB UR QL STRIP: ABNORMAL
KETONES UR STRIP-MCNC: NEGATIVE MG/DL
LEUKOCYTE ESTERASE UR QL STRIP: NEGATIVE
MUCOUS THREADS #/AREA URNS LPF: PRESENT /LPF
NITRATE UR QL: NEGATIVE
PH UR STRIP: 7 PH (ref 4.7–8)
RBC #/AREA URNS AUTO: 0 /HPF (ref 0–2)
SOURCE: ABNORMAL
SP GR UR STRIP: 1.02 (ref 1–1.03)
UROBILINOGEN UR STRIP-MCNC: NORMAL MG/DL (ref 0–2)
WBC #/AREA URNS AUTO: 0 /HPF (ref 0–5)

## 2019-08-29 PROCEDURE — 81001 URINALYSIS AUTO W/SCOPE: CPT | Performed by: NURSE PRACTITIONER

## 2019-08-29 PROCEDURE — 99213 OFFICE O/P EST LOW 20 MIN: CPT | Mod: Z6 | Performed by: NURSE PRACTITIONER

## 2019-08-29 PROCEDURE — 86695 HERPES SIMPLEX TYPE 1 TEST: CPT | Performed by: NURSE PRACTITIONER

## 2019-08-29 PROCEDURE — 86696 HERPES SIMPLEX TYPE 2 TEST: CPT | Performed by: NURSE PRACTITIONER

## 2019-08-29 PROCEDURE — G0463 HOSPITAL OUTPT CLINIC VISIT: HCPCS

## 2019-08-29 PROCEDURE — 36415 COLL VENOUS BLD VENIPUNCTURE: CPT | Performed by: NURSE PRACTITIONER

## 2019-08-29 RX ORDER — ACYCLOVIR 400 MG/1
400 TABLET ORAL 3 TIMES DAILY
Qty: 21 TABLET | Refills: 0 | Status: SHIPPED | OUTPATIENT
Start: 2019-08-29 | End: 2019-09-08

## 2019-08-29 ASSESSMENT — ENCOUNTER SYMPTOMS
NAUSEA: 0
DYSURIA: 1
CHILLS: 0
VOMITING: 0
FEVER: 0
FLANK PAIN: 0

## 2019-08-29 NOTE — ED AVS SNAPSHOT
[facility]  [address] [place] [address]  Phone:  [phone]                                    Aaron Conner   MRN: [id number]    Department:  [facility]   Date of Visit:  8/29/2019           After Visit Summary Signature Page    I have received my discharge instructions, and my questions have been answered. I have discussed any challenges I see with this plan with the nurse or doctor.    ..........................................................................................................................................  Patient/Patient Representative Signature      ..........................................................................................................................................  Patient Representative Print Name and Relationship to Patient    ..................................................               ................................................  Date                                   Time    ..........................................................................................................................................  Reviewed by Signature/Title    ...................................................              ..............................................  Date                                               Time          22EPIC Rev 08/18

## 2019-08-29 NOTE — ED PROVIDER NOTES
History     Chief Complaint   Patient presents with     Exposure to STD     thinks was herpes     HPI  Aaron Conner is a 23 year old male who presents to  for possible herpes exposure. He states that his girlfriend just informed him that she has herpes.  He denies that she had any symptoms and diagnosis was an accidental finding.  He states he has been with the same partner for over a year.  He states he has slight burning with urination, denies any rash, lesions or sores to the penis. He was recently seen in the ED for penile pain and referred to urology.  He states he saw urology yesterday (8/28/2019) and everything checked out fine.  He is requesting a test for herpes.  Denies any concerns for any other STIs.  Denies fever, abdominal pain nausea, vomiting or blood in urine.    Allergies:  No Known Allergies    Problem List:    There are no active problems to display for this patient.       Past Medical History:    Past Medical History:   Diagnosis Date     Allergic rhinitis, cause unspecified 12/10/2010     Anxiety 3/28/2011     Cephalgia 5/14/2012     Chronic tonsillitis 5/14/2012     Peripheral vertigo, unspecified 3/14/2011       Past Surgical History:    Past Surgical History:   Procedure Laterality Date     APPENDECTOMY      appendicitis     foreign  body removed from right long finger  1994     maxillary antrostomy  5/2011    LT       Family History:    Family History   Problem Relation Age of Onset     Cancer Maternal Grandfather         lung     Cancer Mother         ovarian       Social History:  Marital Status:  Single [1]  Social History     Tobacco Use     Smoking status: Current Every Day Smoker     Packs/day: 1.00     Types: Cigarettes     Smokeless tobacco: Former User     Types: Chew   Substance Use Topics     Alcohol use: Yes     Comment: social     Drug use: No        Medications:      acyclovir (ZOVIRAX) 400 MG tablet   loratadine (CLARITIN) 10 MG tablet   mometasone (NASONEX) 50 MCG/ACT  nasal spray         Review of Systems   Constitutional: Negative for chills and fever.   Gastrointestinal: Negative for nausea and vomiting.   Genitourinary: Positive for dysuria. Negative for discharge, flank pain, genital sores and urgency.   All other systems reviewed and are negative.      Physical Exam   BP: 126/75  Pulse: 120  Resp: 18  SpO2: 100 %      Physical Exam   Constitutional: He is oriented to person, place, and time. He appears well-developed. No distress.   Cardiovascular: Normal rate and regular rhythm.   Pulmonary/Chest: Effort normal.   Neurological: He is alert and oriented to person, place, and time.   Skin: Skin is warm and dry. Capillary refill takes less than 2 seconds.   Psychiatric: He has a normal mood and affect.   Nursing note and vitals reviewed.      ED Course   Procedures       Results for orders placed or performed during the hospital encounter of 08/29/19 (from the past 24 hour(s))   UA with Microscopic reflex to Culture   Result Value Ref Range    Color Urine Yellow     Appearance Urine Cloudy     Glucose Urine Negative NEG^Negative mg/dL    Bilirubin Urine Negative NEG^Negative    Ketones Urine Negative NEG^Negative mg/dL    Specific Gravity Urine 1.019 1.003 - 1.035    Blood Urine Trace (A) NEG^Negative    pH Urine 7.0 4.7 - 8.0 pH    Protein Albumin Urine 10 (A) NEG^Negative mg/dL    Urobilinogen mg/dL Normal 0.0 - 2.0 mg/dL    Nitrite Urine Negative NEG^Negative    Leukocyte Esterase Urine Negative NEG^Negative    Source Midstream Urine     WBC Urine 0 0 - 5 /HPF    RBC Urine 0 0 - 2 /HPF    Bacteria Urine None (A) NEG^Negative /HPF    Mucous Urine Present (A) NEG^Negative /LPF    Amorphous Crystals Few (A) NEG^Negative /HPF       Medications - No data to display    Assessments & Plan (with Medical Decision Making)     I have reviewed the nursing notes.    I have reviewed the findings, diagnosis, plan and need for follow up with the patient.  #1 Exposure to STD:    Patient  presents to urgent care following a report from his partner that she was diagnosed with herpes.  Discussed with patient recommended blood testing guidelines for herpes simplex virus being about 4 to 6 weeks post exposure.  Patient states he is unaware how long his partner has had herpes.  Patient requesting herpes blood test.  Herpes simplex 1 and 2 blood testing results pending, will call him with results.  Urinalysis unremarkable. Patient had recent testing for gonorrhea and chlamydia, which was negative.  Will treat patient with acyclovir for 7 days.  Patient education done on genital herpes signs and symptoms and when to seek treatment post antiviral treatment.  Advised patient to follow up with his doctor for any concerns of future herpes outbreaks.  Questions answered. Patient will return to emergency department for worsening or concerning symptoms. Patient verbalizes understanding and agreeable with plan of care.     Discharge Medication List as of 8/29/2019  5:25 PM      START taking these medications    Details   acyclovir (ZOVIRAX) 400 MG tablet Take 1 tablet (400 mg) by mouth 3 times daily for 7 days, Disp-21 tablet, R-0, E-Prescribe             Final diagnoses:   Exposure to STD       8/29/2019   [facility]     Skye Plaza, CNP  08/29/19 3327

## 2019-08-29 NOTE — DISCHARGE INSTRUCTIONS
Take antiviral medication as prescribed.  The blood test is pending, we will call you with results.  Follow-up with your doctor as needed.  Return to emergency department for worsening or concerning symptoms.

## 2019-08-31 LAB
HSV1 IGG SERPL QL IA: <0.2 AI (ref 0–0.8)
HSV2 IGG SERPL QL IA: <0.2 AI (ref 0–0.8)

## 2019-08-31 NOTE — ED NOTES
Accessed patient's chart due to patient calling for lab results. Aware herpes testing was a send out and is still in process.

## 2019-09-01 NOTE — ED NOTES
Pt called wanting to know his lab test results for HSV. Spoke with pt about negative results and pt verbalized understanding of negative results.

## 2019-09-05 NOTE — PROGRESS NOTES
"Subjective     Aaron Conner is a 23 year old male who presents to clinic today for the following health issues:    HPI   Musculoskeletal problem/pain      Duration: 8/21/19    Description  Location: scrotal area    Intensity:  moderate    Accompanying signs and symptoms: rash, burning pain by anus    History  Previous similar problem: YES  Previous evaluation:  none    Precipitating or alleviating factors:  Trauma or overuse: no   Aggravating factors include: none    Therapies tried and outcome: nothing    Seen in ER over half dozen times    Seen Dr Dumont    Multiple tests     Was with GF for over a year - broke up and seen /had sex once with another--- issue started then    Pt is very abscessed on some overall mild sx or recurrance of different issues of genital region                  Reviewed and updated as needed this visit by Provider         Review of Systems   ROS COMP: Constitutional, HEENT, cardiovascular, pulmonary, gi and gu systems are negative, except as otherwise noted.      Objective    /60   Pulse 105   Temp 98.5  F (36.9  C)   Ht 1.651 m (5' 5\")   Wt 59.4 kg (131 lb)   SpO2 97%   BMI 21.80 kg/m    Body mass index is 21.8 kg/m .  Physical Exam   GENERAL: healthy, alert and no distress   (female): normal female external genitalia, normal urethral meatus, vaginal mucosa, normal cervix/adnexa/uterus without masses or discharge  RECTAL (male): perirectal area mild redness - no rash / no lesions   SKIN: no suspicious lesions or rashes- as above            Assessment & Plan       ICD-10-CM    1. Perineal irritation L29.3    2. Anxiety F41.9    Very mild perirectal/ perineal redness with out real rash.  Discussed possible causes of rash and even over cleaning etc.  Long discussion on possible Herpes exposure by GF and what that means and risk of getting it.  Anxiety of all sx going on and all the testing that was done all discussed.  I think his anxiety is getting best of him and over " analyzing / over thinking and over focus on this issue is causing most of his issues. Lots of reassurance. 30 minutes was spent with patient and over 50%  of this time was spent on counseling patient regarding  illness, medication and / or treatment  options, coordinating further cares and follow ups that are needed along with resource material that will be helpful in the treatment of these issues.   Symptomatic treatment was discussed along when patient should call and/or come back into the clinic or go to ER/Urgent care. All questions answered.              No follow-ups on file.    Osmel Salas MD  [facility] [place]

## 2019-09-06 ENCOUNTER — TELEPHONE (OUTPATIENT)
Dept: FAMILY MEDICINE | Facility: OTHER | Age: 24
End: 2019-09-06

## 2019-09-06 NOTE — TELEPHONE ENCOUNTER
12:23 PM    Reason for Call: OVERBOOK    Patient is having the following symptoms: Has an appointment for the 12th and would like to get in sooner if possible.    The patient is requesting an appointment for ASAP with Dr. Salas.    Was an appointment offered for this call?  No    Preferred method for responding to this message: [phone]    If we cannot reach you directly, may we leave a detailed response at the number you provided? Yes    Can ren Barnett

## 2019-09-08 ENCOUNTER — HOSPITAL ENCOUNTER (EMERGENCY)
Facility: HOSPITAL | Age: 24
Discharge: HOME | End: 2019-09-08
Attending: NURSE PRACTITIONER | Admitting: NURSE PRACTITIONER
Payer: COMMERCIAL

## 2019-09-08 VITALS
TEMPERATURE: 97.3 F | SYSTOLIC BLOOD PRESSURE: 118 MMHG | OXYGEN SATURATION: 98 % | DIASTOLIC BLOOD PRESSURE: 83 MMHG | RESPIRATION RATE: 16 BRPM

## 2019-09-08 DIAGNOSIS — N48.9 PENIS SYMPTOM OR SIGN: ICD-10-CM

## 2019-09-08 LAB
C TRACH DNA SPEC QL PROBE+SIG AMP: NOT DETECTED
N GONORRHOEA DNA SPEC QL PROBE+SIG AMP: NOT DETECTED
SPECIMEN SOURCE: NORMAL
SPECIMEN SOURCE: NORMAL
T VAGINALIS DNA SPEC QL NAA+PROBE: NORMAL

## 2019-09-08 PROCEDURE — 87591 N.GONORRHOEAE DNA AMP PROB: CPT | Performed by: NURSE PRACTITIONER

## 2019-09-08 PROCEDURE — 87661 TRICHOMONAS VAGINALIS AMPLIF: CPT | Performed by: NURSE PRACTITIONER

## 2019-09-08 PROCEDURE — 87491 CHLMYD TRACH DNA AMP PROBE: CPT | Performed by: NURSE PRACTITIONER

## 2019-09-08 PROCEDURE — 51798 US URINE CAPACITY MEASURE: CPT

## 2019-09-08 PROCEDURE — G0463 HOSPITAL OUTPT CLINIC VISIT: HCPCS | Mod: 25

## 2019-09-08 PROCEDURE — 99213 OFFICE O/P EST LOW 20 MIN: CPT | Mod: Z6 | Performed by: NURSE PRACTITIONER

## 2019-09-08 ASSESSMENT — ENCOUNTER SYMPTOMS
GASTROINTESTINAL NEGATIVE: 1
FEVER: 0
DYSURIA: 0
FLANK PAIN: 0
CHILLS: 0
NEUROLOGICAL NEGATIVE: 1
ACTIVITY CHANGE: 1

## 2019-09-08 NOTE — ED TRIAGE NOTES
Patient presents with complaints of penile/scrotal and rectal discomfort.  States he has been tested for STI and everything has come back negative; has seen the urologist.  States he continues to have pain/discomfort.  States the pain comes and goes.

## 2019-09-08 NOTE — ED TRIAGE NOTES
Pt presents today with c/o Penile pain for 3 weeks. Already had STI testing. Had G/C and herpes testing. All tests came back negative.

## 2019-09-08 NOTE — ED AVS SNAPSHOT
[facility]  [address] [place] [address]  Phone:  [phone]                                    Aaron Conner   MRN: [id number]    Department:  [facility]   Date of Visit:  9/8/2019           After Visit Summary Signature Page    I have received my discharge instructions, and my questions have been answered. I have discussed any challenges I see with this plan with the nurse or doctor.    ..........................................................................................................................................  Patient/Patient Representative Signature      ..........................................................................................................................................  Patient Representative Print Name and Relationship to Patient    ..................................................               ................................................  Date                                   Time    ..........................................................................................................................................  Reviewed by Signature/Title    ...................................................              ..............................................  Date                                               Time          22EPIC Rev 08/18

## 2019-09-08 NOTE — ED PROVIDER NOTES
"  History     Chief Complaint   Patient presents with     Penis/Scrotum Problem     HPI  Aaron Conner is a 23 year old male who is here for multiple concerns regarding his perianal area. His girlfriend was recently diagnosed with herpes. He has tested negative, but complains of burning and tingling on the head of his penis, accompanied with red small lesions. He has not taken the acyclovir that was prescribed for him because his lab test for herpes was negative. He has had unprotected sex since this testing was completed.   He complains of pressure in the area between his scrotum and anal region while sitting. He states he had felt, \"pea size\" lumps in this region that have now resolved. He recently thought, he had fractured his penis (approximately three weeks ago). Since that time it has been difficult to finish his urine stream because the muscle needed to complete this process doesn't feel right. It feels like his erection is not as firm as usual.   Denies fevers, chills, nausea, vomiting, diarrhea, and shortness of breath.      Allergies:  No Known Allergies    Problem List:    There are no active problems to display for this patient.       Past Medical History:    Past Medical History:   Diagnosis Date     Allergic rhinitis, cause unspecified 12/10/2010     Anxiety 3/28/2011     Cephalgia 5/14/2012     Chronic tonsillitis 5/14/2012     Peripheral vertigo, unspecified 3/14/2011       Past Surgical History:    Past Surgical History:   Procedure Laterality Date     APPENDECTOMY      appendicitis     foreign  body removed from right long finger  1994     maxillary antrostomy  5/2011    LT       Family History:    Family History   Problem Relation Age of Onset     Cancer Maternal Grandfather         lung     Cancer Mother         ovarian       Social History:  Marital Status:  Single [1]  Social History     Tobacco Use     Smoking status: Current Every Day Smoker     Packs/day: 1.00     Types: Cigarettes     " Smokeless tobacco: Former User     Types: Chew   Substance Use Topics     Alcohol use: Yes     Comment: social     Drug use: No        Medications:      loratadine (CLARITIN) 10 MG tablet   mometasone (NASONEX) 50 MCG/ACT nasal spray         Review of Systems   Constitutional: Positive for activity change. Negative for chills and fever.   Gastrointestinal: Negative.    Genitourinary: Positive for genital sores and penile pain. Negative for dysuria, flank pain, penile swelling, scrotal swelling and testicular pain.   Skin: Negative.    Neurological: Negative.        Physical Exam   BP: 118/83  Heart Rate: 98  Temp: 97.3  F (36.3  C)  Resp: 16  SpO2: 98 %      Physical Exam   Constitutional: He is oriented to person, place, and time. He appears well-developed and well-nourished. No distress.   Cardiovascular: Normal rate.   Pulmonary/Chest: Effort normal.   Genitourinary: Rectum normal and testes normal. Rectal exam shows no external hemorrhoid. Right testis shows no tenderness. Left testis shows no tenderness. Circumcised. No penile tenderness. No discharge found.   Genitourinary Comments: No hemorrhoids    Neurological: He is alert and oriented to person, place, and time.   Skin: Skin is warm and dry.   Nursing note and vitals reviewed.      ED Course        Procedures               Results for orders placed or performed during the hospital encounter of 09/08/19 (from the past 24 hour(s))   Trichomonas vaginalis DNA PCR   Result Value Ref Range    Specimen Description Urine     Trichomonas vaginalis DNA PCR  NOTV^Not Detected: Trichomonas target DNA is not detected. All internal controls meet acceptance criteria.     Not Detected: Trichomonas target DNA is not detected. All internal controls meet   acceptance criteria.      GC/Chlamydia by PCR - [facility],   Result Value Ref Range    Specimen Source Urine     Neisseria gonorrhoreae PCR Not Detected NDET^Not Detected    Chlamydia Trachomatis PCR Not Detected NDET^Not  Detected       Medications - No data to display    Assessments & Plan (with Medical Decision Making)     I have reviewed the nursing notes.    I have reviewed the findings, diagnosis, plan and need for follow up with the patient.  Penile, rectal, and perianal concerns regarding STI's and penile injury. G/C and trichomonas testing negative. He was notified of these results. His bladder scan revealed 15 ml residual urine. He has seen a urologist, and been evaluated several times in UC and ER for his complaints that begin 8/23/2019. He had a CT scan completed last June that was negative except for kidney stones. He previously has had a negative UA and his prostrate was evaluated on one of his ER visits and found to be benign. There were no abnormal findings at that time.  He has an appointment with his PCP on 9/12/2019. Suggested his penile concerns may be related to his penile injury and he may still be recovering from it. Described it as a sprained ankle. It takes time to heal. Discharge instructions  reviewed with him and understanding verbalized.      Discharge Medication List as of 9/8/2019  1:53 PM          Final diagnoses:   Penis symptom or sign       9/8/2019   [facility]     Sabine Zamorano, CNP  09/08/19 1627       Sabine Zamorano, CNP  09/08/19 3183

## 2019-09-12 ENCOUNTER — OFFICE VISIT (OUTPATIENT)
Dept: FAMILY MEDICINE | Facility: OTHER | Age: 24
End: 2019-09-12
Attending: FAMILY MEDICINE
Payer: COMMERCIAL

## 2019-09-12 VITALS
HEART RATE: 105 BPM | DIASTOLIC BLOOD PRESSURE: 60 MMHG | HEIGHT: 65 IN | TEMPERATURE: 98.5 F | WEIGHT: 131 LBS | OXYGEN SATURATION: 97 % | BODY MASS INDEX: 21.83 KG/M2 | SYSTOLIC BLOOD PRESSURE: 124 MMHG

## 2019-09-12 DIAGNOSIS — F41.9 ANXIETY: ICD-10-CM

## 2019-09-12 DIAGNOSIS — L29.3 PERINEAL IRRITATION: Primary | ICD-10-CM

## 2019-09-12 PROCEDURE — G0463 HOSPITAL OUTPT CLINIC VISIT: HCPCS

## 2019-09-12 PROCEDURE — 99214 OFFICE O/P EST MOD 30 MIN: CPT | Performed by: FAMILY MEDICINE

## 2019-09-12 ASSESSMENT — MIFFLIN-ST. JEOR: SCORE: 1511.09

## 2019-09-12 ASSESSMENT — PAIN SCALES - GENERAL: PAINLEVEL: MODERATE PAIN (5)

## 2019-09-12 NOTE — NURSING NOTE
"Chief Complaint   Patient presents with     Groin Pain       Initial /60   Pulse 105   Temp 98.5  F (36.9  C)   Ht 1.651 m (5' 5\")   Wt 59.4 kg (131 lb)   SpO2 97%   BMI 21.80 kg/m   Estimated body mass index is 21.8 kg/m  as calculated from the following:    Height as of this encounter: 1.651 m (5' 5\").    Weight as of this encounter: 59.4 kg (131 lb).  Medication Reconciliation: complete  "

## 2019-09-13 ENCOUNTER — TELEPHONE (OUTPATIENT)
Dept: FAMILY MEDICINE | Facility: OTHER | Age: 24
End: 2019-09-13

## 2019-09-13 ENCOUNTER — OFFICE VISIT (OUTPATIENT)
Dept: FAMILY MEDICINE | Facility: OTHER | Age: 24
End: 2019-09-13
Attending: FAMILY MEDICINE
Payer: COMMERCIAL

## 2019-09-13 VITALS
WEIGHT: 131 LBS | TEMPERATURE: 97.9 F | OXYGEN SATURATION: 96 % | HEART RATE: 107 BPM | HEIGHT: 65 IN | SYSTOLIC BLOOD PRESSURE: 122 MMHG | DIASTOLIC BLOOD PRESSURE: 64 MMHG | BODY MASS INDEX: 21.83 KG/M2

## 2019-09-13 DIAGNOSIS — R10.2 PELVIC PAIN IN MALE: ICD-10-CM

## 2019-09-13 DIAGNOSIS — L29.3 PERINEAL IRRITATION: Primary | ICD-10-CM

## 2019-09-13 LAB
BASOPHILS # BLD AUTO: 0 10E9/L (ref 0–0.2)
BASOPHILS NFR BLD AUTO: 0.4 %
CRP SERPL-MCNC: <2.9 MG/L (ref 0–8)
DIFFERENTIAL METHOD BLD: ABNORMAL
EOSINOPHIL # BLD AUTO: 0.1 10E9/L (ref 0–0.7)
EOSINOPHIL NFR BLD AUTO: 0.7 %
ERYTHROCYTE [DISTWIDTH] IN BLOOD BY AUTOMATED COUNT: 11.9 % (ref 10–15)
HCT VFR BLD AUTO: 43.9 % (ref 40–53)
HGB BLD-MCNC: 15.4 G/DL (ref 13.3–17.7)
IMM GRANULOCYTES # BLD: 0 10E9/L (ref 0–0.4)
IMM GRANULOCYTES NFR BLD: 0.4 %
LYMPHOCYTES # BLD AUTO: 1.4 10E9/L (ref 0.8–5.3)
LYMPHOCYTES NFR BLD AUTO: 13.4 %
MCH RBC QN AUTO: 30.9 PG (ref 26.5–33)
MCHC RBC AUTO-ENTMCNC: 35.1 G/DL (ref 31.5–36.5)
MCV RBC AUTO: 88 FL (ref 78–100)
MONOCYTES # BLD AUTO: 0.7 10E9/L (ref 0–1.3)
MONOCYTES NFR BLD AUTO: 6.8 %
NEUTROPHILS # BLD AUTO: 8.4 10E9/L (ref 1.6–8.3)
NEUTROPHILS NFR BLD AUTO: 78.3 %
NRBC # BLD AUTO: 0 10*3/UL
NRBC BLD AUTO-RTO: 0 /100
PLATELET # BLD AUTO: 274 10E9/L (ref 150–450)
PSA SERPL-MCNC: 0.34 UG/L (ref 0–4)
RBC # BLD AUTO: 4.98 10E12/L (ref 4.4–5.9)
WBC # BLD AUTO: 10.7 10E9/L (ref 4–11)

## 2019-09-13 PROCEDURE — G0463 HOSPITAL OUTPT CLINIC VISIT: HCPCS

## 2019-09-13 PROCEDURE — 84153 ASSAY OF PSA TOTAL: CPT | Mod: ZL | Performed by: FAMILY MEDICINE

## 2019-09-13 PROCEDURE — 36415 COLL VENOUS BLD VENIPUNCTURE: CPT | Mod: ZL | Performed by: FAMILY MEDICINE

## 2019-09-13 PROCEDURE — 99213 OFFICE O/P EST LOW 20 MIN: CPT | Performed by: FAMILY MEDICINE

## 2019-09-13 PROCEDURE — 86140 C-REACTIVE PROTEIN: CPT | Mod: ZL | Performed by: FAMILY MEDICINE

## 2019-09-13 PROCEDURE — 85025 COMPLETE CBC W/AUTO DIFF WBC: CPT | Mod: ZL | Performed by: FAMILY MEDICINE

## 2019-09-13 RX ORDER — ALPRAZOLAM 1 MG
TABLET ORAL
Qty: 1 TABLET | Refills: 0 | Status: SHIPPED | OUTPATIENT
Start: 2019-09-13 | End: 2019-09-20

## 2019-09-13 ASSESSMENT — PAIN SCALES - GENERAL: PAINLEVEL: SEVERE PAIN (7)

## 2019-09-13 ASSESSMENT — MIFFLIN-ST. JEOR: SCORE: 1511.09

## 2019-09-13 NOTE — NURSING NOTE
"No chief complaint on file.      Initial /64   Pulse 107   Temp 97.9  F (36.6  C)   Ht 1.651 m (5' 5\")   Wt 59.4 kg (131 lb)   SpO2 96%   BMI 21.80 kg/m   Estimated body mass index is 21.8 kg/m  as calculated from the following:    Height as of this encounter: 1.651 m (5' 5\").    Weight as of this encounter: 59.4 kg (131 lb).  Medication Reconciliation: complete  "

## 2019-09-13 NOTE — TELEPHONE ENCOUNTER
Patient called back and stated that he got off work early today and is available for an appointment today if possible.    Patient also wants to add that pain is also in lower stomach closer to pelvic region.  Patient states difficulty achieving an erection.

## 2019-09-13 NOTE — PROGRESS NOTES
"Subjective     Aaron Conner is a 23 year old male who presents to clinic today for the following health issues:    HPI   Pelvic pain      Duration: on going    Description (location/character/radiation): lower pelvis    Intensity:  moderate    Accompanying signs and symptoms: none    History (similar episodes/previous evaluation): None    Precipitating or alleviating factors: None    Therapies tried and outcome: None    Seen yesterday by me and see note    Redness and perineal swelling better - more pain     Says now can not get erection    Deeper pain in rectal behind scrotal area    Pt has seen numerous providers and Dr Dumont         Current Outpatient Medications   Medication Sig Dispense Refill     ALPRAZolam (XANAX) 1 MG tablet 1 tab orally 1 hr before MRI 1 tablet 0     loratadine (CLARITIN) 10 MG tablet Take 10 mg by mouth daily       mometasone (NASONEX) 50 MCG/ACT nasal spray Spray 2 sprays into both nostrils daily 17 g 0         Reviewed and updated as needed this visit by Provider         Review of Systems   ROS COMP: CONSTITUTIONAL: NEGATIVE for fever, chills, change in weight  RESP: NEGATIVE for significant cough or SOB  CV: NEGATIVE for chest pain, palpitations or peripheral edema      Objective    /64   Pulse 107   Temp 97.9  F (36.6  C)   Ht 1.651 m (5' 5\")   Wt 59.4 kg (131 lb)   SpO2 96%   BMI 21.80 kg/m    Body mass index is 21.8 kg/m .  Physical Exam   GENERAL: healthy, alert and no distress   (male): normal male genitalia without lesions or urethral discharge, no hernia  RECTAL (male): normal sphincter tone, no rectal masses, prostate normal size, smooth, nontender without nodules or masses- normal exam     Results for orders placed or performed in visit on 09/13/19   PSA tumor marker   Result Value Ref Range    PSA 0.34 0 - 4 ug/L   CBC with platelets differential   Result Value Ref Range    WBC 10.7 4.0 - 11.0 10e9/L    RBC Count 4.98 4.4 - 5.9 10e12/L    Hemoglobin 15.4 13.3 - " 17.7 g/dL    Hematocrit 43.9 40.0 - 53.0 %    MCV 88 78 - 100 fl    MCH 30.9 26.5 - 33.0 pg    MCHC 35.1 31.5 - 36.5 g/dL    RDW 11.9 10.0 - 15.0 %    Platelet Count 274 150 - 450 10e9/L    Diff Method Automated Method     % Neutrophils 78.3 %    % Lymphocytes 13.4 %    % Monocytes 6.8 %    % Eosinophils 0.7 %    % Basophils 0.4 %    % Immature Granulocytes 0.4 %    Nucleated RBCs 0 0 /100    Absolute Neutrophil 8.4 (H) 1.6 - 8.3 10e9/L    Absolute Lymphocytes 1.4 0.8 - 5.3 10e9/L    Absolute Monocytes 0.7 0.0 - 1.3 10e9/L    Absolute Eosinophils 0.1 0.0 - 0.7 10e9/L    Absolute Basophils 0.0 0.0 - 0.2 10e9/L    Abs Immature Granulocytes 0.0 0 - 0.4 10e9/L    Absolute Nucleated RBC 0.0    CRP inflammation   Result Value Ref Range    CRP Inflammation <2.9 0.0 - 8.0 mg/L             Assessment & Plan       ICD-10-CM    1. Perineal irritation L29.3 PSA tumor marker     CBC with platelets differential     CRP inflammation     JUST IN CASE     ALPRAZolam (XANAX) 1 MG tablet     MR Pelvis (Intrapelvic Organs) wo&w Contrast   2. Pelvic pain in male R10.2 PSA tumor marker     CBC with platelets differential     CRP inflammation     JUST IN CASE     ALPRAZolam (XANAX) 1 MG tablet     MR Pelvis (Intrapelvic Organs) wo&w Contrast   Discussed case with Radiology - will order one more test - MRI. Discussed with pt reasoning and what a MRI would see.  Probable low yield but has been to clinic and ER over 7-8 times.  Pt very anxious about this and most like this is a supratentorial problem which I discussed.  IF MRI negative - no further test-- will need to treat sx and anxiety. Pt agrees.         No follow-ups on file.    Osmel Salas MD  [facility]

## 2019-09-13 NOTE — TELEPHONE ENCOUNTER
Patient is wondering if he can have a Ct scan of his pelvis? Had pain last night that woke him up.

## 2019-09-14 ENCOUNTER — APPOINTMENT (OUTPATIENT)
Dept: ULTRASOUND IMAGING | Facility: HOSPITAL | Age: 24
End: 2019-09-14
Attending: PHYSICIAN ASSISTANT
Payer: COMMERCIAL

## 2019-09-14 ENCOUNTER — HOSPITAL ENCOUNTER (EMERGENCY)
Facility: HOSPITAL | Age: 24
Discharge: HOME | End: 2019-09-14
Attending: PHYSICIAN ASSISTANT | Admitting: PHYSICIAN ASSISTANT
Payer: COMMERCIAL

## 2019-09-14 VITALS
DIASTOLIC BLOOD PRESSURE: 84 MMHG | HEIGHT: 66 IN | WEIGHT: 130 LBS | OXYGEN SATURATION: 99 % | SYSTOLIC BLOOD PRESSURE: 132 MMHG | TEMPERATURE: 97.4 F | RESPIRATION RATE: 16 BRPM | BODY MASS INDEX: 20.89 KG/M2

## 2019-09-14 DIAGNOSIS — N50.82 SCROTAL PAIN: ICD-10-CM

## 2019-09-14 LAB
ALBUMIN UR-MCNC: NEGATIVE MG/DL
APPEARANCE UR: CLEAR
BACTERIA #/AREA URNS HPF: ABNORMAL /HPF
BILIRUB UR QL STRIP: NEGATIVE
C TRACH DNA SPEC QL PROBE+SIG AMP: NOT DETECTED
COLOR UR AUTO: ABNORMAL
GLUCOSE UR STRIP-MCNC: NEGATIVE MG/DL
HGB UR QL STRIP: ABNORMAL
KETONES UR STRIP-MCNC: NEGATIVE MG/DL
LEUKOCYTE ESTERASE UR QL STRIP: NEGATIVE
MUCOUS THREADS #/AREA URNS LPF: PRESENT /LPF
N GONORRHOEA DNA SPEC QL PROBE+SIG AMP: NOT DETECTED
NITRATE UR QL: NEGATIVE
PH UR STRIP: 7 PH (ref 4.7–8)
RBC #/AREA URNS AUTO: <1 /HPF (ref 0–2)
SOURCE: ABNORMAL
SP GR UR STRIP: 1.02 (ref 1–1.03)
SPECIMEN SOURCE: NORMAL
SPECIMEN SOURCE: NORMAL
T VAGINALIS DNA SPEC QL NAA+PROBE: NORMAL
UROBILINOGEN UR STRIP-MCNC: NORMAL MG/DL (ref 0–2)
WBC #/AREA URNS AUTO: 1 /HPF (ref 0–5)

## 2019-09-14 PROCEDURE — 81001 URINALYSIS AUTO W/SCOPE: CPT | Performed by: PHYSICIAN ASSISTANT

## 2019-09-14 PROCEDURE — 99214 OFFICE O/P EST MOD 30 MIN: CPT | Mod: Z6 | Performed by: PHYSICIAN ASSISTANT

## 2019-09-14 PROCEDURE — G0463 HOSPITAL OUTPT CLINIC VISIT: HCPCS | Mod: 25

## 2019-09-14 PROCEDURE — 87661 TRICHOMONAS VAGINALIS AMPLIF: CPT | Performed by: PHYSICIAN ASSISTANT

## 2019-09-14 PROCEDURE — 87491 CHLMYD TRACH DNA AMP PROBE: CPT | Performed by: PHYSICIAN ASSISTANT

## 2019-09-14 PROCEDURE — 93976 VASCULAR STUDY: CPT | Mod: TC

## 2019-09-14 PROCEDURE — 87591 N.GONORRHOEAE DNA AMP PROB: CPT | Performed by: PHYSICIAN ASSISTANT

## 2019-09-14 ASSESSMENT — MIFFLIN-ST. JEOR: SCORE: 1522.43

## 2019-09-14 ASSESSMENT — ENCOUNTER SYMPTOMS
FREQUENCY: 0
NAUSEA: 0
FEVER: 0
VOMITING: 0
DIFFICULTY URINATING: 0
HEMATURIA: 0
ABDOMINAL PAIN: 0
FLANK PAIN: 0
DIARRHEA: 0

## 2019-09-14 NOTE — ED AVS SNAPSHOT
[facility]  [address] [place] [id number]  Phone:  [phone]                                    Aaron Conner   MRN: [id number]    Department:  [facility]   Date of Visit:  9/14/2019           After Visit Summary Signature Page    I have received my discharge instructions, and my questions have been answered. I have discussed any challenges I see with this plan with the nurse or doctor.    ..........................................................................................................................................  Patient/Patient Representative Signature      ..........................................................................................................................................  Patient Representative Print Name and Relationship to Patient    ..................................................               ................................................  Date                                   Time    ..........................................................................................................................................  Reviewed by Signature/Title    ...................................................              ..............................................  Date                                               Time          22EPIC Rev 08/18

## 2019-09-14 NOTE — DISCHARGE INSTRUCTIONS
Ice as discussed  You can take Tylenol 500 mg every 4 hours and rotate with ibuprofen 600 mg every 6 hours for pain  Be sure to keep your appointment for MRI this coming Wednesday and then follow-up with primary care provider.

## 2019-09-14 NOTE — ED TRIAGE NOTES
"Pt presents with tightness at the base of his urethra and pain under his scrotum for 2 weeks.\" Feels like something is stuck in my urethra\".  "

## 2019-09-15 NOTE — ED PROVIDER NOTES
"  History     Chief Complaint   Patient presents with     Rule out Urinary Tract Infection     \"possible STD, symptoms for past couple weeks\"      HPI  Aaron Conner is a 23 year old male who presents to urgent care with complaint of scrotum pain.  Patient states that over the past couple weeks he has had pain at the base of his penis and under his scrotum.  Patient has been seen for this multiple times by primary care provider and urology.  Patient was just seen yesterday by his primary care provider for this who ordered a CRP, CBC, PSA(all were normal) and a JIC tube. Patient has a MR scheduled for this coming Wednesday.  Patient states he has noted some dark-colored urine and weak stream but denies any hematuria, dysuria, increased urgency or frequency with urination, urethral discharge.  Patient does have a history of kidney stones but none that have required lithotripsy.  Patient is sexually active and in a monogamous relationship according to him.  Patient has tested negative for gonorrhea, chlamydia, trichomonas, herpes.  Patient states however, that his girlfriend does have confirmed herpes.  Patient denies any abdominal pain, CVA pain, nausea, vomiting, diarrhea, rashes, lesions, or any other associated symptoms.    Allergies:  No Known Allergies    Problem List:    There are no active problems to display for this patient.       Past Medical History:    Past Medical History:   Diagnosis Date     Allergic rhinitis, cause unspecified 12/10/2010     Anxiety 3/28/2011     Cephalgia 5/14/2012     Chronic tonsillitis 5/14/2012     Peripheral vertigo, unspecified 3/14/2011       Past Surgical History:    Past Surgical History:   Procedure Laterality Date     APPENDECTOMY      appendicitis     foreign  body removed from right long finger  1994     maxillary antrostomy  5/2011    LT       Family History:    Family History   Problem Relation Age of Onset     Cancer Maternal Grandfather         lung     Cancer Mother  " "       ovarian       Social History:  Marital Status:  Single [1]  Social History     Tobacco Use     Smoking status: Former Smoker     Packs/day: 1.00     Types: Cigarettes     Smokeless tobacco: Former User     Types: Chew   Substance Use Topics     Alcohol use: Yes     Comment: social     Drug use: No        Medications:      loratadine (CLARITIN) 10 MG tablet   mometasone (NASONEX) 50 MCG/ACT nasal spray   ALPRAZolam (XANAX) 1 MG tablet         Review of Systems   Constitutional: Negative for fever.   Gastrointestinal: Negative for abdominal pain, diarrhea, nausea and vomiting.   Genitourinary: Negative for decreased urine volume, difficulty urinating, flank pain, frequency, hematuria, penile swelling, scrotal swelling and urgency.        See HPI       Physical Exam   BP: 132/84  Heart Rate: 109  Temp: 97.4  F (36.3  C)  Resp: 16  Height: 167.6 cm (5' 6\")  Weight: 59 kg (130 lb)  SpO2: 99 %      Physical Exam   Constitutional: He is oriented to person, place, and time. He appears well-developed and well-nourished. No distress.   Cardiovascular: Regular rhythm and normal heart sounds.   Pulmonary/Chest: Effort normal and breath sounds normal. No respiratory distress.   Abdominal: Hernia confirmed negative in the right inguinal area and confirmed negative in the left inguinal area.   Genitourinary: Right testis shows no mass. Left testis shows tenderness. Left testis shows no mass. Circumcised. No penile erythema. No discharge found.   Genitourinary Comments: Patient is tender to palpation over her left epididymis   Neurological: He is alert and oriented to person, place, and time.   Nursing note and vitals reviewed.      ED Course        Procedures              Critical Care time:             Results for orders placed or performed during the hospital encounter of 09/14/19 (from the past 24 hour(s))   GC/Chlamydia by PCR - [facility]   Result Value Ref Range    Specimen Source Urine     Neisseria gonorrhoreae PCR Not " Detected NDET^Not Detected    Chlamydia Trachomatis PCR Not Detected NDET^Not Detected   Trichomonas vaginalis DNA PCR   Result Value Ref Range    Specimen Description Urine     Trichomonas vaginalis DNA PCR  NOTV^Not Detected: Trichomonas target DNA is not detected. All internal controls meet acceptance criteria.     Not Detected: Trichomonas target DNA is not detected. All internal controls meet   acceptance criteria.      UA with Microscopic reflex to Culture   Result Value Ref Range    Color Urine Light Yellow     Appearance Urine Clear     Glucose Urine Negative NEG^Negative mg/dL    Bilirubin Urine Negative NEG^Negative    Ketones Urine Negative NEG^Negative mg/dL    Specific Gravity Urine 1.018 1.003 - 1.035    Blood Urine Trace (A) NEG^Negative    pH Urine 7.0 4.7 - 8.0 pH    Protein Albumin Urine Negative NEG^Negative mg/dL    Urobilinogen mg/dL Normal 0.0 - 2.0 mg/dL    Nitrite Urine Negative NEG^Negative    Leukocyte Esterase Urine Negative NEG^Negative    Source Midstream Urine     WBC Urine 1 0 - 5 /HPF    RBC Urine <1 0 - 2 /HPF    Bacteria Urine None (A) NEG^Negative /HPF    Mucous Urine Present (A) NEG^Negative /LPF   US Testicular & Scrotum w Doppler Ltd    Narrative    PROCEDURE: US TESTICULAR AND SCROTUM WITH DOPPLER LIMITED    HISTORY: rule out epididymitis    TECHNIQUE:  A renal ultrasound was performed.    COMPARISON:  CT abdomen pelvis 6/10/2019    FINDINGS:     MEASUREMENTS:   Right testis: 3.6 x 2.3 x 2.4 (Length x AP x Width)  Left testis: 3.5 x 2.4 x 2.8 (Length x AP x Width)    TESTES:  The testes are normal in size and echogenicity.  No  intratesticular mass is seen. Symmetric arterial flow is present in  both testes on color flow and spectral Doppler evaluation.    EPIDIDYMIDES:  The epididymides are not enlarged.     OTHER: There is no hydrocele or varicocele.      Impression    IMPRESSION:     Negative scrotal ultrasound.    SIS ANDERSON MD       Medications - No data to  display    Assessments & Plan (with Medical Decision Making)   #1.  Scrotal pain      Discussed exam findings as well as lab and ultrasound results with patient.  Discussed appropriate use of ice along with Tylenol and ibuprofen as directed.  I recommend patient keep his upcoming appointment on Wednesday for a MR of pelvis.  Follow-up with primary care provider with any additional concerns.  Patient verbalizes understanding and agreement of plan.        I have reviewed the nursing notes.    I have reviewed the findings, diagnosis, plan and need for follow up with the patient.      Discharge Medication List as of 9/14/2019  6:54 PM          Final diagnoses:   Scrotal pain       9/14/2019   [facility]     Robin Ortiz PA-C  09/14/19 [id number]

## 2019-09-18 ENCOUNTER — HOSPITAL ENCOUNTER (OUTPATIENT)
Dept: MRI IMAGING | Facility: HOSPITAL | Age: 24
Discharge: HOME | End: 2019-09-18
Attending: FAMILY MEDICINE | Admitting: FAMILY MEDICINE
Payer: COMMERCIAL

## 2019-09-18 ENCOUNTER — TELEPHONE (OUTPATIENT)
Dept: FAMILY MEDICINE | Facility: OTHER | Age: 24
End: 2019-09-18

## 2019-09-18 DIAGNOSIS — R10.2 PELVIC PAIN IN MALE: ICD-10-CM

## 2019-09-18 DIAGNOSIS — L29.3 PERINEAL IRRITATION: ICD-10-CM

## 2019-09-18 PROCEDURE — 72195 MRI PELVIS W/O DYE: CPT | Mod: TC

## 2019-09-18 NOTE — PROGRESS NOTES
Subjective     Aaron Conner is a 23 year old male who presents to clinic today for the following health issues:    HPI   Abnormal Mood Symptoms      Duration: about a month ago    Description:  Depression: no   Anxiety: YES  Panic attacks: YES- about 2-3 a week     Accompanying signs and symptoms: see PHQ-9 and TOM scores    History (similar episodes/previous evaluation): has discussed with Dr. Salas prior - pt does not want to elaborate with nurse.     Precipitating or alleviating factors: None    Therapies tried and outcome: Zoloft (Sertraline) back in early 2000's- pt states did not help     Doing some better after MRI - less focused on genitals. Mild tip of penis burning only    PHQ-9 SCORE 11/27/2017 11/30/2018 9/20/2019   PHQ-9 Total Score 6 0 0     TOM-7 SCORE 11/27/2017 11/30/2018 9/20/2019   Total Score 0 1 5             Current Outpatient Medications   Medication Sig Dispense Refill     loratadine (CLARITIN) 10 MG tablet Take 10 mg by mouth daily       mometasone (NASONEX) 50 MCG/ACT nasal spray Spray 2 sprays into both nostrils daily (Patient not taking: Reported on 9/20/2019) 17 g 0         Reviewed and updated as needed this visit by Provider         Review of Systems   ROS COMP: CONSTITUTIONAL: NEGATIVE for fever, chills, change in weight  RESP: NEGATIVE for significant cough or SOB  CV: NEGATIVE for chest pain, palpitations or peripheral edema      Objective    /70   Pulse 98   Temp 97.4  F (36.3  C) (Tympanic)   Wt 58.1 kg (128 lb)   SpO2 98%   BMI 20.66 kg/m    Body mass index is 20.66 kg/m .  Physical Exam   GENERAL: healthy, alert and no distress  PSYCH: mentation appears normal, affect normal/bright            Assessment & Plan       ICD-10-CM    1. Anxiety F41.9    Doing some better. Discussed all tests and discussed his anxiety and some probable somatization from it.  Discussed prn to daily meds. Right now will hold on all meds or counseling. Pt to move on and keep mind off this.   F/u prn.          No follow-ups on file.    Osmel Salas MD  [facility]

## 2019-09-18 NOTE — TELEPHONE ENCOUNTER
"Pt calls to speak with Song  Has \" ongoing problem \"  Offers no further information.    Please call pt.    Calista Rosado LPN    "

## 2019-09-20 ENCOUNTER — OFFICE VISIT (OUTPATIENT)
Dept: FAMILY MEDICINE | Facility: OTHER | Age: 24
End: 2019-09-20
Attending: FAMILY MEDICINE
Payer: COMMERCIAL

## 2019-09-20 VITALS
HEART RATE: 98 BPM | SYSTOLIC BLOOD PRESSURE: 110 MMHG | OXYGEN SATURATION: 98 % | BODY MASS INDEX: 20.66 KG/M2 | WEIGHT: 128 LBS | DIASTOLIC BLOOD PRESSURE: 70 MMHG | TEMPERATURE: 97.4 F

## 2019-09-20 DIAGNOSIS — F41.9 ANXIETY: Primary | ICD-10-CM

## 2019-09-20 PROCEDURE — G0463 HOSPITAL OUTPT CLINIC VISIT: HCPCS | Mod: 25

## 2019-09-20 PROCEDURE — 99213 OFFICE O/P EST LOW 20 MIN: CPT | Performed by: FAMILY MEDICINE

## 2019-09-20 ASSESSMENT — ANXIETY QUESTIONNAIRES
4. TROUBLE RELAXING: SEVERAL DAYS
6. BECOMING EASILY ANNOYED OR IRRITABLE: SEVERAL DAYS
1. FEELING NERVOUS, ANXIOUS, OR ON EDGE: SEVERAL DAYS
3. WORRYING TOO MUCH ABOUT DIFFERENT THINGS: SEVERAL DAYS
2. NOT BEING ABLE TO STOP OR CONTROL WORRYING: NOT AT ALL
GAD7 TOTAL SCORE: 5
7. FEELING AFRAID AS IF SOMETHING AWFUL MIGHT HAPPEN: NOT AT ALL
5. BEING SO RESTLESS THAT IT IS HARD TO SIT STILL: SEVERAL DAYS

## 2019-09-20 ASSESSMENT — PAIN SCALES - GENERAL: PAINLEVEL: NO PAIN (0)

## 2019-09-20 ASSESSMENT — PATIENT HEALTH QUESTIONNAIRE - PHQ9: SUM OF ALL RESPONSES TO PHQ QUESTIONS 1-9: 0

## 2019-09-21 ASSESSMENT — ANXIETY QUESTIONNAIRES: GAD7 TOTAL SCORE: 5

## 2019-09-23 ENCOUNTER — TELEPHONE (OUTPATIENT)
Dept: FAMILY MEDICINE | Facility: OTHER | Age: 24
End: 2019-09-23

## 2019-09-23 DIAGNOSIS — R82.90 CLOUDY URINE: ICD-10-CM

## 2019-09-23 DIAGNOSIS — R82.90 CLOUDY URINE: Primary | ICD-10-CM

## 2019-09-23 LAB
ALBUMIN UR-MCNC: NEGATIVE MG/DL
AMORPH CRY #/AREA URNS HPF: ABNORMAL /HPF
APPEARANCE UR: ABNORMAL
BACTERIA #/AREA URNS HPF: ABNORMAL /HPF
BILIRUB UR QL STRIP: NEGATIVE
COLOR UR AUTO: ABNORMAL
GLUCOSE UR STRIP-MCNC: NEGATIVE MG/DL
HGB UR QL STRIP: NEGATIVE
KETONES UR STRIP-MCNC: NEGATIVE MG/DL
LEUKOCYTE ESTERASE UR QL STRIP: NEGATIVE
MUCOUS THREADS #/AREA URNS LPF: PRESENT /LPF
NITRATE UR QL: NEGATIVE
PH UR STRIP: 7.5 PH (ref 4.7–8)
RBC #/AREA URNS AUTO: 1 /HPF (ref 0–2)
SOURCE: ABNORMAL
SP GR UR STRIP: 1.01 (ref 1–1.03)
UROBILINOGEN UR STRIP-MCNC: NORMAL MG/DL (ref 0–2)
WBC #/AREA URNS AUTO: 3 /HPF (ref 0–5)

## 2019-09-23 PROCEDURE — 87086 URINE CULTURE/COLONY COUNT: CPT | Mod: ZL,59 | Performed by: FAMILY MEDICINE

## 2019-09-23 PROCEDURE — 81001 URINALYSIS AUTO W/SCOPE: CPT | Mod: ZL,59 | Performed by: FAMILY MEDICINE

## 2019-09-23 NOTE — TELEPHONE ENCOUNTER
Patient calling complaining that urination has been changing today with a feeling that end of urinating feels it is thicker and sticky.

## 2019-09-23 NOTE — TELEPHONE ENCOUNTER
Patient called back, informed him of recommendations of provider.  Does want to be able to bring in a urine sample for analysis due to urine being cloudy.  Please advise

## 2019-09-23 NOTE — TELEPHONE ENCOUNTER
As we talked - I have nothing more to offer him. He can or we can send him back to Urology. Drink more fluids.

## 2019-09-25 ENCOUNTER — OFFICE VISIT (OUTPATIENT)
Dept: UROLOGY | Facility: OTHER | Age: 24
End: 2019-09-25
Attending: UROLOGY
Payer: COMMERCIAL

## 2019-09-25 VITALS
OXYGEN SATURATION: 100 % | HEART RATE: 115 BPM | TEMPERATURE: 97.5 F | RESPIRATION RATE: 16 BRPM | SYSTOLIC BLOOD PRESSURE: 122 MMHG | WEIGHT: 128 LBS | BODY MASS INDEX: 20.66 KG/M2 | DIASTOLIC BLOOD PRESSURE: 68 MMHG

## 2019-09-25 DIAGNOSIS — R10.2 PERINEAL PAIN IN MALE: Primary | ICD-10-CM

## 2019-09-25 PROCEDURE — 99214 OFFICE O/P EST MOD 30 MIN: CPT | Performed by: UROLOGY

## 2019-09-25 PROCEDURE — G0463 HOSPITAL OUTPT CLINIC VISIT: HCPCS

## 2019-09-25 ASSESSMENT — PAIN SCALES - GENERAL: PAINLEVEL: SEVERE PAIN (6)

## 2019-09-25 NOTE — PROGRESS NOTES
"Type of Visit  NPV    Chief Complaint  Testicular pain    HPI  Mr. Conner is a 23 year old male who follows up with chronic pelvic symptoms including diminished sensation of the penis, penile pain, testicular pain, perineal pain, rectal pain.  He is also concerned about changes in the skin of the penile shaft and scrotum.  Symptoms all started about 5 weeks ago.  He describes bending of the penis but denies bruising or any signs or symptoms consistent with penile fracture.  Since the event he has spontaneously achieved multiple erections without issue.  He has been seen in the ED over 10 times over the last year.    Pain-associated HPI  Quality:    Dull  Provocative factors:  Activity makes it worse  Palliative factors:   Rest makes it better  Radiation:   None  Onset:    5 weeks ago  Previous tx:  None      Family History  Family History   Problem Relation Age of Onset     Cancer Maternal Grandfather         lung     Cancer Mother         ovarian       Review of Systems  I personally reviewed the ROS with the patient.    Nursing Notes:   Deiys Broussard LPN  9/25/2019 10:52 AM  Signed  Chief Complaint   Patient presents with     RECHECK     Penile pain, worsening.        Initial /68   Pulse 115   Temp 97.5  F (36.4  C) (Tympanic)   Resp 16   Wt 58.1 kg (128 lb)   SpO2 100%   BMI 20.66 kg/m    Estimated body mass index is 20.66 kg/m  as calculated from the following:    Height as of 9/14/19: 1.676 m (5' 6\").    Weight as of this encounter: 58.1 kg (128 lb).  Medication Reconciliation: complete   Review of Systems:    Weight loss:    No     Recent fever/chills:  yes   Night sweats:   ys  Current skin rash:  No   Recent hair loss:  No  Heat intolerance:  No   Cold intolerance:  No  Chest pain:   No   Palpitations:   No  Shortness of breath:  No   Wheezing:   No  Constipation:    No   Diarrhea:   yes   Nausea:   yes   Vomiting:   yes   Kidney/side pain:  yes   Back pain:   yes  Frequent " headaches:  No   Dizziness:     yes  Leg swelling:   No   Calf pain:    No        Deisy Broussard LPN      Physical Exam  Vitals:    09/25/19 1044   BP: 122/68   Pulse: 115   Resp: 16   Temp: 97.5  F (36.4  C)   TempSrc: Tympanic   SpO2: 100%   Weight: 58.1 kg (128 lb)     Constitutional: No acute distress.  Alert and cooperative   Head: NCAT  Eyes: Conjunctivae normal  Cardiovascular: Regular rate.  Pulmonary/Chest: Respirations are even and non-labored bilaterally, no audible wheezing  Abdominal: Soft. No distension, tenderness, masses or guarding.   Neurological: A + O x 3.  Cranial Nerves II-XII grossly intact.  Extremities: ISABELLA x 4, Warm. No clubbing.  No cyanosis.    Skin: Pink, warm and dry.  No visible rashes noted.  Psychiatric:  Normal mood and affect  Back:  No left CVA tenderness.  No right CVA tenderness.  Genitourinary:  Nonpalpable bladder  Normal male phallus without discharge or lesions, normal pubic hair distribution.    Testicles descended bilaterally.    Labs  Results for orders placed or performed in visit on 09/23/19   UA with Microscopic reflex to Culture   Result Value Ref Range    Color Urine Light Yellow     Appearance Urine Slightly Cloudy     Glucose Urine Negative NEG^Negative mg/dL    Bilirubin Urine Negative NEG^Negative    Ketones Urine Negative NEG^Negative mg/dL    Specific Gravity Urine 1.013 1.003 - 1.035    Blood Urine Negative NEG^Negative    pH Urine 7.5 4.7 - 8.0 pH    Protein Albumin Urine Negative NEG^Negative mg/dL    Urobilinogen mg/dL Normal 0.0 - 2.0 mg/dL    Nitrite Urine Negative NEG^Negative    Leukocyte Esterase Urine Negative NEG^Negative    Source Midstream Urine     WBC Urine 3 0 - 5 /HPF    RBC Urine 1 0 - 2 /HPF    Bacteria Urine None (A) NEG^Negative /HPF    Mucous Urine Present (A) NEG^Negative /LPF    Amorphous Crystals Few (A) NEG^Negative /HPF   Urine Culture Aerobic Bacterial   Result Value Ref Range    Specimen Description Midstream Urine     Culture  Micro Culture negative monitoring continues      Imaging  MR Pelvis  9/18/2019  IMPRESSION: Mildly prominent inguinal lymph nodes. No pelvic mass.    ^^^^^^^^^^^^^^^^^^^^^^^^^^^^^^^^^^^^    Scrotal US  9/14/2019  IMPRESSION:   Negative scrotal ultrasound.    ^^^^^^^^^^^^^^^^^^^^^^^^^^^^^^^^^^^^    CT Stone  6/10/2019  IMPRESSION:    Mild left hydroureteronephrosis secondary to a 3 mm stone in the mid  left ureter    Assessment  Mr. Conner is a 23 year old male who follows up with chronic pelvic symptoms including diminished sensation of the penis, penile pain, testicular pain, perineal pain, rectal pain.  He is also concerned about changes in the skin of the penile shaft and scrotum -I explained that his physical exam is completely normal    He has been seen in the ED 13 times in the last year, primarily for versions of pelvic/scrotal/perineal pain.  He has been extensively imaged including CT, MRI and ultrasound.    I recommended pelvic floor physical therapy but he is not interested.    Plan  I explained that I do not strongly recommend any additional testing or work-up however I did offer cystoscopy primarily to rule out a urethral stricture.  I explained that clinically I do not expect he that he would have a urethral stricture however if there was an identifiable abnormality this would be the most likely.        I spent 30 minutes on this patient's visit and over 50% of this time was spent in face-to-face counseling regarding his diagnosis, treatment options with emphasis on  risks and benefits of each, prognosis and importance of compliance.

## 2019-09-25 NOTE — NURSING NOTE
"Chief Complaint   Patient presents with     RECHECK     Penile pain, worsening.        Initial /68   Pulse 115   Temp 97.5  F (36.4  C) (Tympanic)   Resp 16   Wt 58.1 kg (128 lb)   SpO2 100%   BMI 20.66 kg/m   Estimated body mass index is 20.66 kg/m  as calculated from the following:    Height as of 9/14/19: 1.676 m (5' 6\").    Weight as of this encounter: 58.1 kg (128 lb).  Medication Reconciliation: complete   Review of Systems:    Weight loss:    No     Recent fever/chills:  yes   Night sweats:   ys  Current skin rash:  No   Recent hair loss:  No  Heat intolerance:  No   Cold intolerance:  No  Chest pain:   No   Palpitations:   No  Shortness of breath:  No   Wheezing:   No  Constipation:    No   Diarrhea:   yes   Nausea:   yes   Vomiting:   yes   Kidney/side pain:  yes   Back pain:   yes  Frequent headaches:  No   Dizziness:     yes  Leg swelling:   No   Calf pain:    No        Deisy Broussard LPN    "

## 2019-09-26 LAB
BACTERIA SPEC CULT: NO GROWTH
SPECIMEN SOURCE: NORMAL

## 2019-10-05 ENCOUNTER — APPOINTMENT (OUTPATIENT)
Dept: CT IMAGING | Facility: HOSPITAL | Age: 24
End: 2019-10-05
Attending: PHYSICIAN ASSISTANT
Payer: COMMERCIAL

## 2019-10-05 ENCOUNTER — HOSPITAL ENCOUNTER (EMERGENCY)
Facility: HOSPITAL | Age: 24
Discharge: HOME | End: 2019-10-05
Attending: PHYSICIAN ASSISTANT | Admitting: PHYSICIAN ASSISTANT
Payer: COMMERCIAL

## 2019-10-05 VITALS
SYSTOLIC BLOOD PRESSURE: 121 MMHG | HEIGHT: 65 IN | DIASTOLIC BLOOD PRESSURE: 85 MMHG | TEMPERATURE: 97.6 F | RESPIRATION RATE: 16 BRPM | WEIGHT: 130 LBS | BODY MASS INDEX: 21.66 KG/M2 | OXYGEN SATURATION: 96 %

## 2019-10-05 DIAGNOSIS — N20.0 KIDNEY STONE: Primary | ICD-10-CM

## 2019-10-05 LAB
ALBUMIN UR-MCNC: 10 MG/DL
AMORPH CRY #/AREA URNS HPF: ABNORMAL /HPF
APPEARANCE UR: ABNORMAL
BACTERIA #/AREA URNS HPF: ABNORMAL /HPF
BILIRUB UR QL STRIP: NEGATIVE
COLOR UR AUTO: YELLOW
GLUCOSE UR STRIP-MCNC: NEGATIVE MG/DL
HGB UR QL STRIP: ABNORMAL
KETONES UR STRIP-MCNC: NEGATIVE MG/DL
LEUKOCYTE ESTERASE UR QL STRIP: NEGATIVE
MUCOUS THREADS #/AREA URNS LPF: PRESENT /LPF
NITRATE UR QL: NEGATIVE
PH UR STRIP: 6.5 PH (ref 4.7–8)
RBC #/AREA URNS AUTO: >182 /HPF (ref 0–2)
SOURCE: ABNORMAL
SP GR UR STRIP: 1.02 (ref 1–1.03)
UROBILINOGEN UR STRIP-MCNC: NORMAL MG/DL (ref 0–2)
WBC #/AREA URNS AUTO: 0 /HPF (ref 0–5)

## 2019-10-05 PROCEDURE — G0463 HOSPITAL OUTPT CLINIC VISIT: HCPCS | Mod: 25

## 2019-10-05 PROCEDURE — 74176 CT ABD & PELVIS W/O CONTRAST: CPT | Mod: TC

## 2019-10-05 PROCEDURE — 99214 OFFICE O/P EST MOD 30 MIN: CPT | Mod: Z6 | Performed by: PHYSICIAN ASSISTANT

## 2019-10-05 PROCEDURE — 81001 URINALYSIS AUTO W/SCOPE: CPT | Performed by: PHYSICIAN ASSISTANT

## 2019-10-05 RX ORDER — TAMSULOSIN HYDROCHLORIDE 0.4 MG/1
0.4 CAPSULE ORAL DAILY
Qty: 10 CAPSULE | Refills: 0 | Status: SHIPPED | OUTPATIENT
Start: 2019-10-05 | End: 2019-12-23

## 2019-10-05 ASSESSMENT — ENCOUNTER SYMPTOMS
ABDOMINAL PAIN: 1
VOMITING: 0
FEVER: 0
DYSURIA: 0
DIARRHEA: 0
NAUSEA: 0
DIFFICULTY URINATING: 0
FREQUENCY: 0
HEMATURIA: 1

## 2019-10-05 ASSESSMENT — MIFFLIN-ST. JEOR: SCORE: 1506.56

## 2019-10-05 NOTE — ED PROVIDER NOTES
History     Chief Complaint   Patient presents with     Hematuria     HPI  Aaron Conner is a 23 year old male who presents to urgent care for evaluation of hematuria.  Patient states that this morning he noted blood in his urine.  He states that the first urination had some thick bloody clots mixed with urine.  He states he is also having some mild lower abdominal discomfort.  Patient states that he does have a history of kidney stones.  His last one was in May; approximately 3 mm and took approximately 1 week to pass.  Patient denies any history of obstructing stone.  Patient denies any fevers, CVA tenderness, nausea, vomiting, diarrhea, dysuria, urgency, increased frequency with urination, or any other associated symptoms at this time.  Patient does not take anything over-the-counter.  Patient does have a history of penile pain, and scrotal discomfort.  He has been worked up for this multiple times by urgent care, primary care provider, and urology with no known etiology.  He states that his penile pain is slightly better than the last time he was seen here in the urgent care on 9/14/2019.    Allergies:  No Known Allergies    Problem List:    There are no active problems to display for this patient.       Past Medical History:    Past Medical History:   Diagnosis Date     Allergic rhinitis, cause unspecified 12/10/2010     Anxiety 3/28/2011     Cephalgia 5/14/2012     Chronic tonsillitis 5/14/2012     Peripheral vertigo, unspecified 3/14/2011       Past Surgical History:    Past Surgical History:   Procedure Laterality Date     APPENDECTOMY      appendicitis     foreign  body removed from right long finger  1994     maxillary antrostomy  5/2011    LT       Family History:    Family History   Problem Relation Age of Onset     Cancer Maternal Grandfather         lung     Cancer Mother         ovarian       Social History:  Marital Status:  Single [1]  Social History     Tobacco Use     Smoking status: Former  "Smoker     Packs/day: 1.00     Types: Cigarettes     Smokeless tobacco: Current User     Types: Chew   Substance Use Topics     Alcohol use: Yes     Comment: social     Drug use: No        Medications:    tamsulosin (FLOMAX) 0.4 MG capsule  loratadine (CLARITIN) 10 MG tablet  mometasone (NASONEX) 50 MCG/ACT nasal spray          Review of Systems   Constitutional: Negative for fever.   Gastrointestinal: Positive for abdominal pain. Negative for diarrhea, nausea and vomiting.   Genitourinary: Positive for hematuria and penile pain. Negative for difficulty urinating, dysuria, frequency, penile swelling and scrotal swelling.       Physical Exam   BP: 121/85  Heart Rate: 116  Temp: 97.6  F (36.4  C)  Resp: 16  Height: 165.1 cm (5' 5\")  Weight: 59 kg (130 lb)  SpO2: 96 %      Physical Exam  Vitals signs and nursing note reviewed.   Constitutional:       General: He is not in acute distress.     Appearance: He is not ill-appearing or toxic-appearing.   Cardiovascular:      Rate and Rhythm: Regular rhythm.      Heart sounds: Normal heart sounds.   Pulmonary:      Breath sounds: Normal breath sounds.   Abdominal:      General: Abdomen is flat. Bowel sounds are normal. There is no distension.      Tenderness: There is no tenderness. There is no right CVA tenderness, left CVA tenderness or guarding.   Neurological:      Mental Status: He is alert.         ED Course        Procedures              Critical Care time:               Results for orders placed or performed during the hospital encounter of 10/05/19 (from the past 24 hour(s))   UA with Microscopic reflex to Culture   Result Value Ref Range    Color Urine Yellow     Appearance Urine Slightly Cloudy     Glucose Urine Negative NEG^Negative mg/dL    Bilirubin Urine Negative NEG^Negative    Ketones Urine Negative NEG^Negative mg/dL    Specific Gravity Urine 1.023 1.003 - 1.035    Blood Urine Large (A) NEG^Negative    pH Urine 6.5 4.7 - 8.0 pH    Protein Albumin Urine 10 " (A) NEG^Negative mg/dL    Urobilinogen mg/dL Normal 0.0 - 2.0 mg/dL    Nitrite Urine Negative NEG^Negative    Leukocyte Esterase Urine Negative NEG^Negative    Source Midstream Urine     WBC Urine 0 0 - 5 /HPF    RBC Urine >182 (H) 0 - 2 /HPF    Bacteria Urine None (A) NEG^Negative /HPF    Mucous Urine Present (A) NEG^Negative /LPF    Amorphous Crystals Few (A) NEG^Negative /HPF   Abd/pelvis CT - no contrast - Stone Protocol    Narrative    EXAMINATION: CT ABDOMEN PELVIS W/O CONTRAST, 10/5/2019 9:46 AM    TECHNIQUE:  Helical CT images from the lung bases through the  symphysis pubis were obtained  without IV contrast. Contrast dose:    COMPARISON: June 2019    HISTORY: Urinary tract stone, known, no complications or risk factors    FINDINGS:    The lung bases are clear    The liver is free of masses or biliary ductal enlargement. No  calcified gallstones are seen.    The the spleen and pancreas appear normal.    The adrenal glands are normal.    There are bilateral intrarenal calculi measuring 2 to 3 mm. There is  mild dilation of the left renal collecting system and proximal left  ureter. At the L4 level is a 3 mm diameter calculus. The calculus is  in the same position as the stone seen in June 2019.    The periaortic lymph nodes are normal in caliber.    No intraperitoneal masses or inflammatory changes are noted.    In the pelvis the bladder and rectum appear normal.    The regional skeleton is intact      Impression    IMPRESSION: Bilateral intrarenal calculi. Mild dilation of the left  renal collecting system with a 3 mm diameter left ureteric calculus  noted at the L4 level on the left.     MANGO LARA MD       Medications - No data to display    Assessments & Plan (with Medical Decision Making)   #1.  Kidney stone    Discussed exam findings as well as CT result with patient.  Patient is prescribed Flomax.  Instructed to push fluids.  We discussed that more likely due to the size of the stone but will  more than likely pass without any other intervention.  Appropriate dosing of Tylenol and ibuprofen is discussed with patient.  Any further concern please return to emergency department or follow-up with primary care provider.  Patient verbalizes understanding and agreement of plan.      I have reviewed the nursing notes.    I have reviewed the findings, diagnosis, plan and need for follow up with the patient.      Discharge Medication List as of 10/5/2019 10:09 AM      START taking these medications    Details   tamsulosin (FLOMAX) 0.4 MG capsule Take 1 capsule (0.4 mg) by mouth daily for 10 doses, Disp-10 capsule, R-0, E-Prescribe             Final diagnoses:   Kidney stone       10/5/2019   [facility]     Robin Ortiz PA-C  10/05/19 [id number]

## 2019-10-05 NOTE — ED TRIAGE NOTES
C/o hematuria since this am, lower abdominal pain and flank pain. States hx of kidney stones in the past.

## 2019-10-05 NOTE — ED AVS SNAPSHOT
[facility]  [address] [place] [id number]  Phone:  [phone]                                    Aaron Conner   MRN: [id number]    Department:  [facility]   Date of Visit:  10/5/2019           After Visit Summary Signature Page    I have received my discharge instructions, and my questions have been answered. I have discussed any challenges I see with this plan with the nurse or doctor.    ..........................................................................................................................................  Patient/Patient Representative Signature      ..........................................................................................................................................  Patient Representative Print Name and Relationship to Patient    ..................................................               ................................................  Date                                   Time    ..........................................................................................................................................  Reviewed by Signature/Title    ...................................................              ..............................................  Date                                               Time          22EPIC Rev 08/18

## 2019-10-15 ENCOUNTER — OFFICE VISIT (OUTPATIENT)
Dept: FAMILY MEDICINE | Facility: OTHER | Age: 24
End: 2019-10-15
Attending: FAMILY MEDICINE
Payer: COMMERCIAL

## 2019-10-15 VITALS
HEART RATE: 133 BPM | OXYGEN SATURATION: 96 % | HEIGHT: 65 IN | BODY MASS INDEX: 21.66 KG/M2 | SYSTOLIC BLOOD PRESSURE: 92 MMHG | DIASTOLIC BLOOD PRESSURE: 74 MMHG | WEIGHT: 130 LBS

## 2019-10-15 DIAGNOSIS — N20.0 CALCULUS OF KIDNEY: Primary | ICD-10-CM

## 2019-10-15 PROCEDURE — G0463 HOSPITAL OUTPT CLINIC VISIT: HCPCS

## 2019-10-15 PROCEDURE — 99213 OFFICE O/P EST LOW 20 MIN: CPT | Performed by: FAMILY MEDICINE

## 2019-10-15 ASSESSMENT — MIFFLIN-ST. JEOR: SCORE: 1506.56

## 2019-10-15 ASSESSMENT — PAIN SCALES - GENERAL: PAINLEVEL: NO PAIN (0)

## 2019-10-15 NOTE — PROGRESS NOTES
"Subjective     Aaron Conner is a 23 year old male who presents to clinic today for the following health issues:    HPI   ED/UC Followup:    Facility:  [facility] [place]  Date of visit: 10-5-19  Reason for visit: kidney stone   Current Status: pt states he passed the kidney stone about 2 days after his UC visit. Pt states he has been having some burning and itching, however denies any further hematuria. Denies any abdominal pain.  Stone passed a day or so after -  Seen it  This is second stone he passed--- analysis done in June   Has several stones in both kidneys  No extra calcium products             Current Outpatient Medications   Medication Sig Dispense Refill     loratadine (CLARITIN) 10 MG tablet Take 10 mg by mouth daily       mometasone (NASONEX) 50 MCG/ACT nasal spray Spray 2 sprays into both nostrils daily 17 g 0     tamsulosin (FLOMAX) 0.4 MG capsule Take 1 capsule (0.4 mg) by mouth daily for 10 doses 10 capsule 0     No Known Allergies      Reviewed and updated as needed this visit by Provider         Review of Systems   ROS COMP: Constitutional,gi and gu systems are negative, except as otherwise noted.      Objective    BP 92/74 (Patient Position: Sitting)   Pulse 133   Ht 1.651 m (5' 5\")   Wt 59 kg (130 lb)   SpO2 96%   BMI 21.63 kg/m    Body mass index is 21.63 kg/m .  Physical Exam   GENERAL: healthy, alert and no distress  ABDOMEN: soft, nontender, no hepatosplenomegaly, no masses and bowel sounds normal          CT reviewed and last stone analysis       Assessment & Plan       ICD-10-CM    1. Calculus of kidney N20.0    Stone passed. Discussed make up June stone. Needs to push fluids and not hold urine. Still has few real small stones in both kidneys-- I do not see urology doing anything about them since so small. . Discussed if passes again what to do.  Symptomatic treatment was discussed along when patient should call and/or come back into the clinic or go to ER/Urgent care. All questions answered.      "         No follow-ups on file.    Osmel Salas MD  [facility] [place]

## 2019-10-15 NOTE — NURSING NOTE
"Chief Complaint   Patient presents with     ER F/U       Initial BP 92/74 (Patient Position: Sitting)   Pulse 133   Ht 1.651 m (5' 5\")   Wt 59 kg (130 lb)   SpO2 96%   BMI 21.63 kg/m   Estimated body mass index is 21.63 kg/m  as calculated from the following:    Height as of this encounter: 1.651 m (5' 5\").    Weight as of this encounter: 59 kg (130 lb).  Medication Reconciliation: complete  Henna Garnett LPN  "

## 2019-12-23 ENCOUNTER — OFFICE VISIT (OUTPATIENT)
Dept: FAMILY MEDICINE | Facility: OTHER | Age: 24
End: 2019-12-23
Attending: FAMILY MEDICINE
Payer: COMMERCIAL

## 2019-12-23 ENCOUNTER — TELEPHONE (OUTPATIENT)
Dept: FAMILY MEDICINE | Facility: OTHER | Age: 24
End: 2019-12-23

## 2019-12-23 VITALS
WEIGHT: 132 LBS | HEIGHT: 65 IN | DIASTOLIC BLOOD PRESSURE: 66 MMHG | BODY MASS INDEX: 21.99 KG/M2 | TEMPERATURE: 97.6 F | HEART RATE: 83 BPM | SYSTOLIC BLOOD PRESSURE: 120 MMHG | OXYGEN SATURATION: 96 %

## 2019-12-23 DIAGNOSIS — J01.01 ACUTE RECURRENT MAXILLARY SINUSITIS: Primary | ICD-10-CM

## 2019-12-23 PROCEDURE — 99213 OFFICE O/P EST LOW 20 MIN: CPT | Performed by: FAMILY MEDICINE

## 2019-12-23 PROCEDURE — G0463 HOSPITAL OUTPT CLINIC VISIT: HCPCS

## 2019-12-23 RX ORDER — AMOXICILLIN AND CLAVULANATE POTASSIUM 500; 125 MG/1; MG/1
1 TABLET, FILM COATED ORAL 2 TIMES DAILY
Qty: 20 TABLET | Refills: 0 | Status: SHIPPED | OUTPATIENT
Start: 2019-12-23 | End: 2020-01-29

## 2019-12-23 RX ORDER — MOMETASONE FUROATE MONOHYDRATE 50 UG/1
2 SPRAY, METERED NASAL DAILY
Qty: 17 G | Refills: 11 | Status: SHIPPED | OUTPATIENT
Start: 2019-12-23 | End: 2022-12-12

## 2019-12-23 ASSESSMENT — PAIN SCALES - GENERAL: PAINLEVEL: NO PAIN (0)

## 2019-12-23 ASSESSMENT — MIFFLIN-ST. JEOR: SCORE: 1515.63

## 2019-12-23 NOTE — PROGRESS NOTES
"Subjective     Aaron Conner is a 23 year old male who presents to clinic today for the following health issues:    HPI   RESPIRATORY SYMPTOMS      Duration: couple weeks    Description  nasal congestion, rhinorrhea, sore throat, facial pain/pressure, cough, headache and hoarse voice    Severity: moderate    Accompanying signs and symptoms: None    History (predisposing factors):  none    Precipitating or alleviating factors: None    Therapies tried and outcome:  Antihistamine    Not getting better     Increase PND- going into lungs     H/o chronic sinus issues         Current Outpatient Medications   Medication Sig Dispense Refill     loratadine (CLARITIN) 10 MG tablet Take 10 mg by mouth daily       mometasone (NASONEX) 50 MCG/ACT nasal spray Spray 2 sprays into both nostrils daily (Patient not taking: Reported on 12/23/2019) 17 g 0     No Known Allergies      Reviewed and updated as needed this visit by Provider         Review of Systems   ROS COMP: CONSTITUTIONAL: NEGATIVE for fever, chills, change in weight  RESP: NEGATIVE for significant cough or SOB  CV: NEGATIVE for chest pain,       Objective    /66   Pulse 83   Temp 97.6  F (36.4  C)   Ht 1.651 m (5' 5\")   Wt 59.9 kg (132 lb)   SpO2 96%   BMI 21.97 kg/m    Body mass index is 21.97 kg/m .  Physical Exam   GENERAL: healthy, alert and no distress  HENT: ear canals and TM's normal, nose and mouth without ulcers or lesions-- maxillary sinus tenderness.   NECK: no adenopathy, no asymmetry, masses, or scars and thyroid normal to palpation    RESP: lungs clear to auscultation - no rales, rhonchi or wheezes  CV: regular rate and rhythm, normal S1 S2, no S3 or S4, no murmur, click or rub, no peripheral edema and peripheral pulses strong  ABDOMEN: soft, nontender, no hepatosplenomegaly, no masses and bowel sounds normal            Assessment & Plan       ICD-10-CM    1. Acute recurrent maxillary sinusitis J01.01 mometasone (NASONEX) 50 MCG/ACT nasal spray "     amoxicillin-clavulanate (AUGMENTIN) 500-125 MG tablet   will treat . Symptomatic treatment was discussed along what is available for OTC medications for symptomatic relief.   Symptomatic treatment was discussed along when patient should call and/or come back into the clinic or go to ER/Urgent care. All questions answered.              No follow-ups on file.    Osmel Salas MD  [facility]

## 2019-12-27 ENCOUNTER — TELEPHONE (OUTPATIENT)
Dept: OTOLARYNGOLOGY | Facility: OTHER | Age: 24
End: 2019-12-27

## 2019-12-27 DIAGNOSIS — J32.4 CHRONIC PANSINUSITIS: Primary | ICD-10-CM

## 2019-12-27 NOTE — TELEPHONE ENCOUNTER
Pt has an upcoming appointment scheduled with Dr. Trevizo.  He is wondering if he could have a CT sinus completed in the meantime?  Please sign off the order.

## 2019-12-31 ENCOUNTER — HOSPITAL ENCOUNTER (OUTPATIENT)
Dept: CT IMAGING | Facility: HOSPITAL | Age: 24
Discharge: HOME | End: 2019-12-31
Attending: PHYSICIAN ASSISTANT | Admitting: PHYSICIAN ASSISTANT
Payer: COMMERCIAL

## 2019-12-31 PROCEDURE — 70486 CT MAXILLOFACIAL W/O DYE: CPT | Mod: TC

## 2020-01-29 ENCOUNTER — OFFICE VISIT (OUTPATIENT)
Dept: OTOLARYNGOLOGY | Facility: OTHER | Age: 25
End: 2020-01-29
Attending: OTOLARYNGOLOGY
Payer: COMMERCIAL

## 2020-01-29 VITALS
WEIGHT: 132 LBS | SYSTOLIC BLOOD PRESSURE: 132 MMHG | HEIGHT: 65 IN | TEMPERATURE: 98.7 F | HEART RATE: 93 BPM | OXYGEN SATURATION: 97 % | BODY MASS INDEX: 21.99 KG/M2 | DIASTOLIC BLOOD PRESSURE: 68 MMHG

## 2020-01-29 DIAGNOSIS — Z71.6 TOBACCO ABUSE COUNSELING: ICD-10-CM

## 2020-01-29 DIAGNOSIS — J34.1 MAXILLARY SINUS CYST: Primary | ICD-10-CM

## 2020-01-29 DIAGNOSIS — R51.9 FACIAL PAIN: ICD-10-CM

## 2020-01-29 DIAGNOSIS — Z98.890 HISTORY OF ENDOSCOPIC SINUS SURGERY: ICD-10-CM

## 2020-01-29 DIAGNOSIS — J34.3 NASAL TURBINATE HYPERTROPHY: ICD-10-CM

## 2020-01-29 PROCEDURE — 99244 OFF/OP CNSLTJ NEW/EST MOD 40: CPT | Mod: 25 | Performed by: OTOLARYNGOLOGY

## 2020-01-29 PROCEDURE — G0463 HOSPITAL OUTPT CLINIC VISIT: HCPCS

## 2020-01-29 PROCEDURE — 31231 NASAL ENDOSCOPY DX: CPT | Performed by: OTOLARYNGOLOGY

## 2020-01-29 RX ORDER — BUDESONIDE 0.5 MG/2ML
INHALANT ORAL
Qty: 3 BOX | Refills: 11 | Status: SHIPPED | OUTPATIENT
Start: 2020-01-29 | End: 2022-12-12

## 2020-01-29 ASSESSMENT — PAIN SCALES - GENERAL: PAINLEVEL: NO PAIN (0)

## 2020-01-29 ASSESSMENT — MIFFLIN-ST. JEOR: SCORE: 1510.63

## 2020-01-29 NOTE — PATIENT INSTRUCTIONS
Thank you for allowing Dr. Trevizo and our ENT team to participate in your care.  If your medications are too expensive, please give the nurse a call.  We can possibly change this medication.  If you have a scheduling or an appointment question please contact our Health Unit Coordinator at their direct line [phone].   ALL nursing questions or concerns can be directed to your ENT nurse at: [phone] - Noreen    Use Budesonide Rinses Twice Daily to both nostrils  Continue Nasonex 2 Sprays, Once Daily  Follow up with Dr. Trevizo in 2 Months    Budesonide nasal saline irrigation per instructions:  -Obtain [facility] Sinus rinse over the counter.    -Use warm distilled water and 2 packets of the salt solution that comes with the bottle, dissolve in bottle up to the 240 mL chepe.  -Add 1 vial of budesonide.  -Irrigate each side of your nose leaning over the sink, using 1/3 to 1/2 the volume of the bottle in each nostril every irrigation.  Irrigate 2 times daily.  -If additional rinses are needed/recommended, you may use the plan [facility] Sinus irrigation without the use of added budesonide.

## 2020-01-29 NOTE — NURSING NOTE
"Chief Complaint   Patient presents with     Consult For     Chronic Sinusitis; Referred by Dr. Salas       Initial /68 (Cuff Size: Adult Regular)   Pulse 93   Temp 98.7  F (37.1  C) (Tympanic)   Ht 1.651 m (5' 5\")   Wt 59.9 kg (132 lb)   SpO2 97%   BMI 21.97 kg/m   Estimated body mass index is 21.97 kg/m  as calculated from the following:    Height as of this encounter: 1.651 m (5' 5\").    Weight as of this encounter: 59.9 kg (132 lb).  Medication Reconciliation: complete  Noreen Pepper LPN    "

## 2020-01-29 NOTE — LETTER
2020         RE: Aaron Conner  [address] [address]        Dear Colleague,    Thank you for referring your patient, Aaron Conner, to the [facility]. Please see a copy of my visit note below.    Otolaryngology Consultation    Patient: Aaron Conner  : 1995    Patient presents with:  Consult For: Chronic Sinusitis; Referred by Dr. Salas      HPI:  Aaron Conner is a 24 year old male seen today for chronic sinusitis.    He has had over 1 year of left maxillary pressure most noticeable when he leans forward or jumps up and down he does have a history of a maxillary sinus cyst removed in     He has chronic bilateral congestion and chronic postnasal drainage    Aaron has had 3-4 episodes of acute sinusitis treated with antibiotics over the past 2 years.  He describes bilateral periorbital and frontal pressure as well as maxillary pressure, purulent discharge and fatigue.  Most recently he was treated with Augmentin.  He has been on 2 courses of Augmentin as well as Zithromax in the past year.  in the prior year he was on 3 courses of antibiotics including Augmentin twice Cefzil as well as a Medrol dose pack twice     he had been smoking but has nearly quit smoking.  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     Past Medical History:   Diagnosis Date     Allergic rhinitis, cause unspecified 12/10/2010     Anxiety 3/28/2011     Cephalgia 5/14/2012     Chronic tonsillitis 5/14/2012     Peripheral vertigo, unspecified 3/14/2011       Past Surgical History:   Procedure Laterality Date     APPENDECTOMY      appendicitis     foreign  body removed from right long finger  1994     maxillary antrostomy  5/2011    LT       ENT family history reviewed    Social History     Tobacco Use     Smoking status: Former Smoker     Packs/day: 1.00     Types: Cigarettes     Smokeless tobacco: Current User     Types: Chew   Substance Use Topics     Alcohol use: Yes     Comment: social     Drug use: No       Review of Systems  ROS: 10 point ROS neg other than the symptoms noted above in the HPI     Physical Exam  /68 (Cuff Size: Adult Regular)   Pulse 93   Temp 98.7  F (37.1  C) (Tympanic)   Ht 1.651 m (5' 5\")   Wt 59.9 kg (132 lb)   SpO2 97%   BMI 21.97 kg/m     General - The patient is well nourished and well developed, and appears to have good nutritional status.  Alert and oriented to person and place, answers questions and cooperates with examination appropriately.   Head and Face - Normocephalic and atraumatic, with no gross asymmetry noted.  The facial nerve is intact, with strong symmetric movements.  Voice and Breathing - The patient was breathing comfortably without the use of accessory muscles. There was no wheezing, stridor, or stertor.  The patients voice was clear and strong, and had appropriate pitch and quality.  No johnna peripheral digital clubbing or cyanosis   Ears -The external auditory canals are patent, the tympanic membranes are intact without effusion, retraction or mass.  Bony landmarks are intact.  Eyes - Extraocular movements intact, and the pupils were reactive to light.  Sclera were not icteric or injected, conjunctiva were pink and moist.  Mouth - " Examination of the oral cavity showed pink, healthy oral mucosa. No lesions or ulcerations noted.  The tongue was mobile and midline, and the dentition were in good condition.    Throat - The walls of the oropharynx were smooth, pink, moist, symmetric, and had no lesions or ulcerations.  The tonsillar pillars and soft palate were symmetric.  The uvula was midline on elevation.    Neck - No palpable enlarged fixed cervical lymph nodes.  No neck cysts or unusual tenderness to palpation.   No palpable fixed thyroid nodules or concerning goiter.  The trachea is grossly midline.   Nose - External contour is symmetric, no gross deflection or scars.  Nasal mucosa is pink and moist with no abnormal mucus.  The septum and turbinates were evaluated:  inferior turbinate hypertrophy .  No polyps, masses, or purulence noted on examination.      To evaluate the nose and sinuses, I performed rigid nasal endoscopy. The LPN had previously sprayed both nares with lidocaine and neosynephrine.    I began with the LEFT side using a 0 degree rigid nasal endoscope, and then similarly examined the RIGHT side    Findings:  Inferior turbinates:  enlarged  Middle turbinate and middle meatus:  No purulence, no polyposis  Left antrostomy patent, cannot viz polyps  Mucosa is healthy throughout,  No other polyps nor polypoid degeneration  Sphenoethmoidal recesses grossly clear  Nasopharynx clear, ET patent  The patient tolerated the procedure well    Imaging      CT sinus dated 12/31/2019 reveals clear frontal sinusitis the ethmoid sinuses are clear sphenoid sinuses are clear the right maxillary sinus is clear there is a polypoid retention cyst or polyp arising from the floor of the left maxillary sinus and extending posterior and superior.  The ostiomeatal complex does appear patent on the left it appears he may have had an antrostomy septum is grossly midline turbinates are edematous skull base is intact optic nerve is not dehiscent keratosis  2    SNOT 15      Impression and Plan- Aaron Conner is a 24 year old male with:    ICD-10-CM    1. 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 History of endoscopic sinus surgery Z98.890    3. Nasal turbinate hypertrophy J34.3    4. Facial pain R51    5. Tobacco abuse counseling Z71.6          Use Budesonide Rinses Twice Daily to both nostrils  Continue Nasonex 2 Sprays, Once Daily  Follow up with Dr. Trevizo in 2 Months if no improvement proceed with surgery,  recent potential complications as well as the possibility of endoscopic medial maxillectomy if this is an inverted papilloma of the left maxillary sinus were discussed.  If on medial maxillectomy is performed there is a chance he may have permanent numbness to the upper teeth and upper lip.  He voices understanding    The risks and complications of bilateral Endoscopic Sinus Surgery with polypectomy, frozens, possible left medial maxillectomy , bilateral turbinate reduction were openly discussed.  The potential risks include bleeding, general anesthesia, infection, scar formation, need for additional surgery, septal perforation, and rarely injury to the eye with the possibility of blindness,  injury to the brain, meningitis or other intracranial complications.  Nonsurgical options were discussed including prolonged antibioitics and/or oral and topical steroids.   Sinus anatomy and sinus disease were reviewed.  CT sinus was reviewed with the patient.  All questions were answered.     Would include left polypectomy with frozens, bilateral max, AE, frontals due to LAUREEN  Would use propels    He is to follow-up with his dentist regularly and will work on quitting tobacco entirely (chewing and vaping rarely)    Tobacco cessation was strongly encouraged.  The associated risk of head and neck cancer was discussed.  Every year of cessation offers health benefits. This was discussed with the patient today and they voiced  understanding.  Quit tools and a nicotine patch were offered.      Former op note and final pathology reviewed      Helen Trevizo D.O.  Otolaryngology/Head and Neck Surgery  Allergy          Again, thank you for allowing me to participate in the care of your patient.        Sincerely,        Helen Trevizo MD

## 2020-01-29 NOTE — PROGRESS NOTES
Otolaryngology Consultation    Patient: Aaron Conner  : 1995    Patient presents with:  Consult For: Chronic Sinusitis; Referred by Dr. Salas      HPI:  Aaron Conner is a 24 year old male seen today for chronic sinusitis.    He has had over 1 year of left maxillary pressure most noticeable when he leans forward or jumps up and down he does have a history of a maxillary sinus cyst removed in     He has chronic bilateral congestion and chronic postnasal drainage    Aaron has had 3-4 episodes of acute sinusitis treated with antibiotics over the past 2 years.  He describes bilateral periorbital and frontal pressure as well as maxillary pressure, purulent discharge and fatigue.  Most recently he was treated with Augmentin.  He has been on 2 courses of Augmentin as well as Zithromax in the past year.  in the prior year he was on 3 courses of antibiotics including Augmentin twice Cefzil as well as a Medrol dose pack twice     he had been smoking but has nearly quit smoking.  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     Past Medical History:   Diagnosis Date     Allergic rhinitis, cause unspecified 12/10/2010     Anxiety 3/28/2011     Cephalgia 2012     Chronic tonsillitis 2012     Peripheral vertigo,  "unspecified 3/14/2011       Past Surgical History:   Procedure Laterality Date     APPENDECTOMY      appendicitis     foreign  body removed from right long finger  1994     maxillary antrostomy  5/2011    LT       ENT family history reviewed    Social History     Tobacco Use     Smoking status: Former Smoker     Packs/day: 1.00     Types: Cigarettes     Smokeless tobacco: Current User     Types: Chew   Substance Use Topics     Alcohol use: Yes     Comment: social     Drug use: No       Review of Systems  ROS: 10 point ROS neg other than the symptoms noted above in the HPI     Physical Exam  /68 (Cuff Size: Adult Regular)   Pulse 93   Temp 98.7  F (37.1  C) (Tympanic)   Ht 1.651 m (5' 5\")   Wt 59.9 kg (132 lb)   SpO2 97%   BMI 21.97 kg/m    General - The patient is well nourished and well developed, and appears to have good nutritional status.  Alert and oriented to person and place, answers questions and cooperates with examination appropriately.   Head and Face - Normocephalic and atraumatic, with no gross asymmetry noted.  The facial nerve is intact, with strong symmetric movements.  Voice and Breathing - The patient was breathing comfortably without the use of accessory muscles. There was no wheezing, stridor, or stertor.  The patients voice was clear and strong, and had appropriate pitch and quality.  No johnna peripheral digital clubbing or cyanosis   Ears -The external auditory canals are patent, the tympanic membranes are intact without effusion, retraction or mass.  Bony landmarks are intact.  Eyes - Extraocular movements intact, and the pupils were reactive to light.  Sclera were not icteric or injected, conjunctiva were pink and moist.  Mouth - Examination of the oral cavity showed pink, healthy oral mucosa. No lesions or ulcerations noted.  The tongue was mobile and midline, and the dentition were in good condition.    Throat - The walls of the oropharynx were smooth, pink, moist, symmetric, " and had no lesions or ulcerations.  The tonsillar pillars and soft palate were symmetric.  The uvula was midline on elevation.    Neck - No palpable enlarged fixed cervical lymph nodes.  No neck cysts or unusual tenderness to palpation.   No palpable fixed thyroid nodules or concerning goiter.  The trachea is grossly midline.   Nose - External contour is symmetric, no gross deflection or scars.  Nasal mucosa is pink and moist with no abnormal mucus.  The septum and turbinates were evaluated:  inferior turbinate hypertrophy .  No polyps, masses, or purulence noted on examination.      To evaluate the nose and sinuses, I performed rigid nasal endoscopy. The LPN had previously sprayed both nares with lidocaine and neosynephrine.    I began with the LEFT side using a 0 degree rigid nasal endoscope, and then similarly examined the RIGHT side    Findings:  Inferior turbinates:  enlarged  Middle turbinate and middle meatus:  No purulence, no polyposis  Left antrostomy patent, cannot viz polyps  Mucosa is healthy throughout,  No other polyps nor polypoid degeneration  Sphenoethmoidal recesses grossly clear  Nasopharynx clear, ET patent  The patient tolerated the procedure well    Imaging      CT sinus dated 12/31/2019 reveals clear frontal sinusitis the ethmoid sinuses are clear sphenoid sinuses are clear the right maxillary sinus is clear there is a polypoid retention cyst or polyp arising from the floor of the left maxillary sinus and extending posterior and superior.  The ostiomeatal complex does appear patent on the left it appears he may have had an antrostomy septum is grossly midline turbinates are edematous skull base is intact optic nerve is not dehiscent keratosis 2    SNOT 15      Impression and Plan- Aaron Conner is a 24 year old male with:    ICD-10-CM    1. 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 History of endoscopic sinus surgery Z98.890    3. Nasal turbinate  hypertrophy J34.3    4. Facial pain R51    5. Tobacco abuse counseling Z71.6          Use Budesonide Rinses Twice Daily to both nostrils  Continue Nasonex 2 Sprays, Once Daily  Follow up with Dr. Trevizo in 2 Months if no improvement proceed with surgery,  recent potential complications as well as the possibility of endoscopic medial maxillectomy if this is an inverted papilloma of the left maxillary sinus were discussed.  If on medial maxillectomy is performed there is a chance he may have permanent numbness to the upper teeth and upper lip.  He voices understanding    The risks and complications of bilateral Endoscopic Sinus Surgery with polypectomy, frozens, possible left medial maxillectomy , bilateral turbinate reduction were openly discussed.  The potential risks include bleeding, general anesthesia, infection, scar formation, need for additional surgery, septal perforation, and rarely injury to the eye with the possibility of blindness,  injury to the brain, meningitis or other intracranial complications.  Nonsurgical options were discussed including prolonged antibioitics and/or oral and topical steroids.   Sinus anatomy and sinus disease were reviewed.  CT sinus was reviewed with the patient.  All questions were answered.     Would include left polypectomy with frozens, bilateral max, AE, frontals due to LAUREEN  Would use propels    He is to follow-up with his dentist regularly and will work on quitting tobacco entirely (chewing and vaping rarely)    Tobacco cessation was strongly encouraged.  The associated risk of head and neck cancer was discussed.  Every year of cessation offers health benefits. This was discussed with the patient today and they voiced understanding.  Quit tools and a nicotine patch were offered.      Former op note and final pathology reviewed      Helen Trevizo D.O.  Otolaryngology/Head and Neck Surgery  Allergy

## 2020-02-10 ENCOUNTER — APPOINTMENT (OUTPATIENT)
Dept: GENERAL RADIOLOGY | Facility: HOSPITAL | Age: 25
End: 2020-02-10
Attending: EMERGENCY MEDICINE
Payer: COMMERCIAL

## 2020-02-10 ENCOUNTER — HOSPITAL ENCOUNTER (EMERGENCY)
Facility: HOSPITAL | Age: 25
Discharge: HOME | End: 2020-02-10
Attending: EMERGENCY MEDICINE | Admitting: EMERGENCY MEDICINE
Payer: COMMERCIAL

## 2020-02-10 VITALS
DIASTOLIC BLOOD PRESSURE: 73 MMHG | SYSTOLIC BLOOD PRESSURE: 112 MMHG | OXYGEN SATURATION: 100 % | BODY MASS INDEX: 22.47 KG/M2 | RESPIRATION RATE: 16 BRPM | TEMPERATURE: 97.4 F | WEIGHT: 135 LBS

## 2020-02-10 DIAGNOSIS — S91.332A PUNCTURE WOUND OF LEFT FOOT, INITIAL ENCOUNTER: ICD-10-CM

## 2020-02-10 PROCEDURE — 90715 TDAP VACCINE 7 YRS/> IM: CPT | Performed by: EMERGENCY MEDICINE

## 2020-02-10 PROCEDURE — 73630 X-RAY EXAM OF FOOT: CPT | Mod: TC,LT

## 2020-02-10 PROCEDURE — 99283 EMERGENCY DEPT VISIT LOW MDM: CPT | Mod: 25

## 2020-02-10 PROCEDURE — 99284 EMERGENCY DEPT VISIT MOD MDM: CPT | Mod: Z6 | Performed by: EMERGENCY MEDICINE

## 2020-02-10 PROCEDURE — 90471 IMMUNIZATION ADMIN: CPT

## 2020-02-10 PROCEDURE — 25000128 H RX IP 250 OP 636: Performed by: EMERGENCY MEDICINE

## 2020-02-10 RX ORDER — CEPHALEXIN 500 MG/1
500 CAPSULE ORAL 3 TIMES DAILY
Qty: 21 CAPSULE | Refills: 0 | Status: SHIPPED | OUTPATIENT
Start: 2020-02-10 | End: 2020-02-17

## 2020-02-10 RX ADMIN — CLOSTRIDIUM TETANI TOXOID ANTIGEN (FORMALDEHYDE INACTIVATED), CORYNEBACTERIUM DIPHTHERIAE TOXOID ANTIGEN (FORMALDEHYDE INACTIVATED), BORDETELLA PERTUSSIS TOXOID ANTIGEN (GLUTARALDEHYDE INACTIVATED), BORDETELLA PERTUSSIS FILAMENTOUS HEMAGGLUTININ ANTIGEN (FORMALDEHYDE INACTIVATED), BORDETELLA PERTUSSIS PERTACTIN ANTIGEN, AND BORDETELLA PERTUSSIS FIMBRIAE 2/3 ANTIGEN 0.5 ML: 5; 2; 2.5; 5; 3; 5 INJECTION, SUSPENSION INTRAMUSCULAR at 08:18

## 2020-02-10 NOTE — ED AVS SNAPSHOT
[facility]  [address] [address]  Phone:  [phone]                                    Aaron Conner   MRN: [id number]    Department:  [facility]   Date of Visit:  2/10/2020           After Visit Summary Signature Page    I have received my discharge instructions, and my questions have been answered. I have discussed any challenges I see with this plan with the nurse or doctor.    ..........................................................................................................................................  Patient/Patient Representative Signature      ..........................................................................................................................................  Patient Representative Print Name and Relationship to Patient    ..................................................               ................................................  Date                                   Time    ..........................................................................................................................................  Reviewed by Signature/Title    ...................................................              ..............................................  Date                                               Time          22EPIC Rev 08/18

## 2021-02-16 NOTE — NURSING NOTE
"Chief Complaint   Patient presents with     URI       Initial /66   Pulse 83   Temp 97.6  F (36.4  C)   Ht 1.651 m (5' 5\")   Wt 59.9 kg (132 lb)   SpO2 96%   BMI 21.97 kg/m   Estimated body mass index is 21.97 kg/m  as calculated from the following:    Height as of this encounter: 1.651 m (5' 5\").    Weight as of this encounter: 59.9 kg (132 lb).  Medication Reconciliation: complete  Song Odom LPN  " General Daily Progress Note          Patient Name:   Ely Lau       YOB: 1948       Age:  67 y.o. Admit Date: 2/13/2021      Subjective:        Patient is resting the bed asleep, wakes to voice. no distress no new complaints    IMPRESSION  Prominent microvascular ischemic changes     Acute lacunar infarct in the region of the right facial colliculus as described  Above. .  Patient's MRI of the brain revealing subacute ischemia in the right pontomedullary junction which correlates with patient's presenting symptoms. Patient also found to have hemorrhagic transformation of prior pontine infarcts    MRI this morning showed ischemia in right pontomedullary junction. Seen by neurology this morning. Will hold apixaban and increase aspirin to full dose and increase statin therapy due to small vessel disease ischemia.  Evaluated pinpoint pupils and emergent CT ordered negative    Seen by PT given mobility education and training              Objective:     Visit Vitals  BP (!) 148/53 (BP 1 Location: Right upper arm, BP Patient Position: At rest)   Pulse 62   Temp 99 °F (37.2 °C)   Resp 18   Ht 5' 4.02\" (1.626 m)   Wt 83.5 kg (184 lb 1.4 oz)   SpO2 97%   BMI 31.58 kg/m²        Recent Results (from the past 24 hour(s))   GLUCOSE, POC    Collection Time: 02/15/21  9:37 PM   Result Value Ref Range    Glucose (POC) 210 (H) 65 - 100 mg/dL    Performed by Bonnye Sever    GLUCOSE, POC    Collection Time: 02/16/21  3:24 AM   Result Value Ref Range    Glucose (POC) 56 (L) 65 - 100 mg/dL    Performed by Petar Winter    GLUCOSE, POC    Collection Time: 02/16/21  3:45 AM   Result Value Ref Range    Glucose (POC) 56 (L) 65 - 100 mg/dL    Performed by [employer], POC    Collection Time: 02/16/21  3:54 AM   Result Value Ref Range    Glucose (POC) 71 65 - 100 mg/dL    Performed by [employer], POC    Collection Time: 02/16/21  4:38 AM   Result Value Ref Range    Glucose (POC) 83 65 - 100 mg/dL    Performed by [employer], POC    Collection Time: 02/16/21  6:16 AM   Result Value Ref Range    Glucose (POC) 115 (H) 65 - 100 mg/dL    Performed by [address], POC    Collection Time: 02/16/21  8:01 AM   Result Value Ref Range    Glucose (POC) 119 (H) 65 - 100 mg/dL    Performed by DANNY LOCKETT    ECHO ADULT COMPLETE    Collection Time: 02/16/21 10:00 AM   Result Value Ref Range    Aortic Regurgitant Pressure Half-time 558.00 ms    AR Max Zi 370.00 cm/s    Pulmonic Regurgitant End Max Velocity 164.00 cm/s    AoV PG 11.00 mmHg    Aortic Valve Area by Continuity of Peak Velocity 2.70 cm2    IVSd 1.73 (A) 0.6 - 0.9 cm    IVSd (M-mode) 1.81 (A) cm    LVIDd 3.24 (A) 3.9 - 5.3 cm    LVIDd (M-mode) 3.71 (A) cm    LVIDs 2.12 cm    LVIDs (M-mode) 2.46 cm    LVOT d 2.10 cm    Pulmonic Regurgitant End Max Velocity 128.00 cm/s    LVOT Peak Gradient 7.00 mmHg    LVPWd 1.67 (A) 0.6 - 0.9 cm    LVPWd (M-mode) 1.65 (A) cm    LV E' Septal Velocity 3.31 cm/s    LV ED Vol A2C 34.00 cm3    BP EF 72.0 55 - 100 %    BP EF 63.4 55 - 100 %    LV ES Vol A2C 9.53 cm3    LV ES Vol A2C 21.40 cm3    E/E' septal 26.59     LV Ejection Fraction MOD 2C 65.0 %    LV Mass .9 (A) 67 - 162 g    LV Mass AL Index 114.3 (A) 43 - 95 g/m2    LVOT SV 36.2 cm3    Mitral Valve Deceleration [place] 4,750.00 mm/s2    Mitral Valve Deceleration [place] 4,750.00 mm/s2    Mitral Valve E Wave Deceleration Time 355.00 ms    Mitral Valve Pressure Half-time 78.00 ms    MV A Zi 128.00 cm/s    MV E Zi 88.00 cm/s    MVA (PHT) 2.82 cm2    MV E/A 0.69     Pulmonic Regurgitant End Max Velocity 84.50 cm/s    Pulmonic Valve Systolic Peak Instantaneous Gradient 3.00 mmHg    P Vein A Dur 130.00 ms    Pulmonary Vein \"A\" Wave Velocity 22.20 cm/s    Est. RA Pressure 3.00 mmHg    RVIDd 3.43 cm    RVSP 34.00 mmHg    Tricuspid Valve Max Velocity 278.00 cm/s    Triscuspid Valve Regurgitation Peak Gradient 31.00 mmHg    Right Atrial Area 4C 13.80 cm2    LA Area 4C 23.99 cm2    NURA/BSA Pk Zi 1.4 cm2/m2   GLUCOSE, POC    Collection Time: 02/16/21 11:40 AM   Result Value Ref Range    Glucose (POC) 212 (H) 65 - 100 mg/dL    Performed by [address] [place] [address] Street, POC    Collection Time: 02/16/21  3:56 PM   Result Value Ref Range    Glucose (POC) 232 (H) 65 - 100 mg/dL    Performed by Gene Domingo      [unfilled]      Review of Systems    Constitutional: Negative for chills and fever. HENT: Negative. Eyes: Negative. Respiratory: Negative. Cardiovascular: Negative. Gastrointestinal: Negative for abdominal pain and nausea. Skin: Negative. Neurological: Negative. Physical Exam:      Constitutional: pt is oriented to person, place, and time. HENT:   Head: Normocephalic and atraumatic. Eyes: Pupils are equal, round, and reactive to light. EOM are normal.   Cardiovascular: Normal rate, regular rhythm and normal heart sounds. Pulmonary/Chest: Breath sounds normal. No wheezes. No rales. Exhibits no tenderness. Abdominal: Soft. Bowel sounds are normal. Abdominal tenderness. Musculoskeletal: Normal range of motion. Neurological: pt is alert and oriented to person, place, and time. CT HEAD WO CONT   Final Result   Diffuse chronic changes which appear stable. No acute or active   intracranial process. MRI BRAIN WO CONT   Final Result   Prominent microvascular ischemic changes as described above. Acute lacunar infarct in the region of the right facial colliculus as described   above. .           Recent Results (from the past 24 hour(s))   GLUCOSE, POC    Collection Time: 02/15/21  9:37 PM   Result Value Ref Range    Glucose (POC) 210 (H) 65 - 100 mg/dL    Performed by Oval Kaye    GLUCOSE, POC    Collection Time: 02/16/21  3:24 AM   Result Value Ref Range    Glucose (POC) 56 (L) 65 - 100 mg/dL    Performed by Lucinda Reese    GLUCOSE, POC    Collection Time: 02/16/21  3:45 AM   Result Value Ref Range    Glucose (POC) 56 (L) 65 - 100 mg/dL    Performed by [address], POC    Collection Time: 02/16/21  3:54 AM   Result Value Ref Range    Glucose (POC) 71 65 - 100 mg/dL    Performed by [address], POC    Collection Time: 02/16/21  4:38 AM   Result Value Ref Range    Glucose (POC) 83 65 - 100 mg/dL    Performed by [address], POC    Collection Time: 02/16/21  6:16 AM   Result Value Ref Range    Glucose (POC) 115 (H) 65 - 100 mg/dL    Performed by [employer] [address] [employer] [address], POC    Collection Time: 02/16/21  8:01 AM   Result Value Ref Range    Glucose (POC) 119 (H) 65 - 100 mg/dL    Performed by DANNY LOCKETT    ECHO ADULT COMPLETE    Collection Time: 02/16/21 10:00 AM   Result Value Ref Range    Aortic Regurgitant Pressure Half-time 558.00 ms    AR Max Zi 370.00 cm/s    Pulmonic Regurgitant End Max Velocity 164.00 cm/s    AoV PG 11.00 mmHg    Aortic Valve Area by Continuity of Peak Velocity 2.70 cm2    IVSd 1.73 (A) 0.6 - 0.9 cm    IVSd (M-mode) 1.81 (A) cm    LVIDd 3.24 (A) 3.9 - 5.3 cm    LVIDd (M-mode) 3.71 (A) cm    LVIDs 2.12 cm    LVIDs (M-mode) 2.46 cm    LVOT d 2.10 cm    Pulmonic Regurgitant End Max Velocity 128.00 cm/s    LVOT Peak Gradient 7.00 mmHg    LVPWd 1.67 (A) 0.6 - 0.9 cm    LVPWd (M-mode) 1.65 (A) cm    LV E' Septal Velocity 3.31 cm/s    LV ED Vol A2C 34.00 cm3    BP EF 72.0 55 - 100 %    BP EF 63.4 55 - 100 %    LV ES Vol A2C 9.53 cm3    LV ES Vol A2C 21.40 cm3    E/E' septal 26.59     LV Ejection Fraction MOD 2C 65.0 %    LV Mass .9 (A) 67 - 162 g    LV Mass AL Index 114.3 (A) 43 - 95 g/m2    LVOT SV 36.2 cm3    Mitral Valve Deceleration [place] 4,750.00 mm/s2    Mitral Valve Deceleration [place] 4,750.00 mm/s2    Mitral Valve E Wave Deceleration Time 355.00 ms    Mitral Valve Pressure Half-time 78.00 ms    MV A Zi 128.00 cm/s    MV E Zi 88.00 cm/s    MVA (PHT) 2.82 cm2    MV E/A 0.69     Pulmonic Regurgitant End Max Velocity 84.50 cm/s Pulmonic Valve Systolic Peak Instantaneous Gradient 3.00 mmHg    P Vein A Dur 130.00 ms    Pulmonary Vein \"A\" Wave Velocity 22.20 cm/s    Est. RA Pressure 3.00 mmHg    RVIDd 3.43 cm    RVSP 34.00 mmHg    Tricuspid Valve Max Velocity 278.00 cm/s    Triscuspid Valve Regurgitation Peak Gradient 31.00 mmHg    Right Atrial Area 4C 13.80 cm2    LA Area 4C 23.99 cm2    NURA/BSA Pk Zi 1.4 cm2/m2   GLUCOSE, POC    Collection Time: 02/16/21 11:40 AM   Result Value Ref Range    Glucose (POC) 212 (H) 65 - 100 mg/dL    Performed by [address], POC    Collection Time: 02/16/21  3:56 PM   Result Value Ref Range    Glucose (POC) 232 (H) 65 - 100 mg/dL    Performed by [facility]        Results     ** No results found for the last 336 hours. **           Labs:     Recent Labs     02/14/21  0400   WBC 8.5   HGB 10.1*   HCT 32.2*        Recent Labs     02/14/21  0400      K 3.6      CO2 30   BUN 54*   CREA 3.64*   *   CA 9.1     Recent Labs     02/14/21  0400   ALT 16   AP 76   TBILI 0.4   TP 7.0   ALB 3.1*   GLOB 3.9     No results for input(s): INR, PTP, APTT, INREXT, INREXT in the last 72 hours. No results for input(s): FE, TIBC, PSAT, FERR in the last 72 hours. No results found for: FOL, RBCF   No results for input(s): PH, PCO2, PO2 in the last 72 hours. No results for input(s): CPK, CKNDX, TROIQ in the last 72 hours.     No lab exists for component: CPKMB  Lab Results   Component Value Date/Time    Cholesterol, total 117 02/14/2021 11:00 PM    HDL Cholesterol 41 02/14/2021 11:00 PM    LDL, calculated 60.8 02/14/2021 11:00 PM    Triglyceride 76 02/14/2021 11:00 PM    CHOL/HDL Ratio 2.9 02/14/2021 11:00 PM     Lab Results   Component Value Date/Time    Glucose (POC) 232 (H) 02/16/2021 03:56 PM    Glucose (POC) 212 (H) 02/16/2021 11:40 AM    Glucose (POC) 119 (H) 02/16/2021 08:01 AM    Glucose (POC) 115 (H) 02/16/2021 06:16 AM    Glucose (POC) 83 02/16/2021 04:38 AM     Lab Results Component Value Date/Time    Color Yellow/Straw 02/13/2021 12:58 PM    Appearance Hazy (A) 02/13/2021 12:58 PM    Specific gravity 1.010 02/13/2021 12:58 PM    pH (UA) 7.0 02/13/2021 12:58 PM    Protein 30 (A) 02/13/2021 12:58 PM    Glucose Negative 02/13/2021 12:58 PM    Ketone Negative 02/13/2021 12:58 PM    Bilirubin Negative 02/13/2021 12:58 PM    Urobilinogen 0.2 02/13/2021 12:58 PM    Nitrites Negative 02/13/2021 12:58 PM    Leukocyte Esterase Negative 02/13/2021 12:58 PM    Bacteria 1+ (A) 02/13/2021 12:58 PM    WBC 0-4 02/13/2021 12:58 PM    RBC 0-5 02/13/2021 12:58 PM         Assessment:       Right pontomedullay infarct  Hemorrhagic transformation of prior pontine infarcts  History of CVA  Type 2 diabetes  Hypertension  Hyperlipidemia  Anxiety disorder  CAD status post stent  Chronic kidney disease  Recent dental procedures    Echo done shows ejection fraction was 65%    Carotid Dopplers negative    Plan:     MRI IMPRESSION  Patient's MRI of the brain revealing subacute ischemia in the right pontomedullary junction which correlates with patient's presenting symptoms. Patient also found to have hemorrhagic transformation of prior pontine infarcts.     Hold Eliquis  Increase aspirin dose to 325 mg  Continue Lipitor 80 mg  Q4 neuro checks  Plan to discharge to rehabilitation  Outpatient CTA of head/neck 2 weeks after discharge    Possible discharge home tomorrow          Current Facility-Administered Medications:     lidocaine 4 % patch 1 Patch, 1 Patch, TransDERmal, Q24H, Eda Granda MD, 1 Patch at 02/16/21 1426    atorvastatin (LIPITOR) tablet 80 mg, 80 mg, Oral, DAILY, Nikki Bloch, MD, 80 mg at 02/16/21 1022    aspirin delayed-release tablet 325 mg, 325 mg, Oral, DAILY, Mirella Ortiz MD, 325 mg at 02/16/21 1022    allopurinoL (ZYLOPRIM) tablet 100 mg, 100 mg, Oral, BID, Eda Granda MD, 100 mg at 02/16/21 1022    amLODIPine (NORVASC) tablet 10 mg, 10 mg, Oral, DAILY, Rayne Granda Both, MD, 10 mg at 02/16/21 1022    furosemide (LASIX) tablet 40 mg, 40 mg, Oral, TID, Hortencia Granda MD, 40 mg at 02/16/21 1022    metoprolol tartrate (LOPRESSOR) tablet 50 mg, 50 mg, Oral, BID, Eda Granda MD, 50 mg at 02/16/21 1022    traZODone (DESYREL) tablet 100 mg, 100 mg, Oral, QHS, Eda Granda MD, 100 mg at 02/15/21 2335    ferrous sulfate tablet 325 mg, 1 Tab, Oral, DAILY WITH BREAKFAST, Rika Santizo MD, 325 mg at 02/16/21 1022    doxazosin (CARDURA) tablet 2 mg, 2 mg, Oral, QHS, Eda Granda MD, 2 mg at 02/15/21 2336    insulin glargine (LANTUS) injection 30 Units, 30 Units, SubCUTAneous, QHS, Eda Granda MD, 30 Units at 02/15/21 2153    sodium bicarbonate tablet 650 mg, 650 mg, Oral, BID, Hortencia Granda MD, 650 mg at 02/16/21 1022    acetaminophen (TYLENOL) tablet 650 mg, 650 mg, Oral, Q6H PRN, 650 mg at 02/15/21 2152 **OR** acetaminophen (TYLENOL) suppository 650 mg, 650 mg, Rectal, Q6H PRN, Hortencia Granda MD    polyethylene glycol (MIRALAX) packet 17 g, 17 g, Oral, DAILY PRN, Hortencia Granda MD    ondansetron (ZOFRAN ODT) tablet 4 mg, 4 mg, Oral, Q8H PRN **OR** ondansetron (ZOFRAN) injection 4 mg, 4 mg, IntraVENous, Q6H PRN, Eda Granda MD    0.9% sodium chloride infusion, 25 mL/hr, IntraVENous, CONTINUOUS, Eda Granda MD, Last Rate: 25 mL/hr at 02/13/21 2059, 25 mL/hr at 02/13/21 2059    insulin lispro (HUMALOG) injection, , SubCUTAneous, AC&HS, Eda Granda MD, Stopped at 02/14/21 2200    glucose chewable tablet 16 g, 4 Tab, Oral, PRN, Eda Granda MD    dextrose (D50W) injection syrg 12.5-25 g, 25-50 mL, IntraVENous, PRN, Eda Granda MD    glucagon (GLUCAGEN) injection 1 mg, 1 mg, IntraMUSCular, PRN, Rika Santizo MD

## 2022-03-10 ENCOUNTER — APPOINTMENT (OUTPATIENT)
Dept: CT IMAGING | Facility: HOSPITAL | Age: 27
End: 2022-03-10
Attending: STUDENT IN AN ORGANIZED HEALTH CARE EDUCATION/TRAINING PROGRAM
Payer: COMMERCIAL

## 2022-03-10 ENCOUNTER — HOSPITAL ENCOUNTER (EMERGENCY)
Facility: HOSPITAL | Age: 27
Discharge: HOME | End: 2022-03-10
Attending: STUDENT IN AN ORGANIZED HEALTH CARE EDUCATION/TRAINING PROGRAM | Admitting: STUDENT IN AN ORGANIZED HEALTH CARE EDUCATION/TRAINING PROGRAM
Payer: COMMERCIAL

## 2022-03-10 VITALS
TEMPERATURE: 97.2 F | HEART RATE: 73 BPM | WEIGHT: 135 LBS | DIASTOLIC BLOOD PRESSURE: 89 MMHG | OXYGEN SATURATION: 97 % | SYSTOLIC BLOOD PRESSURE: 130 MMHG | RESPIRATION RATE: 18 BRPM | HEIGHT: 65 IN | BODY MASS INDEX: 22.49 KG/M2

## 2022-03-10 DIAGNOSIS — S06.0X1A CONCUSSION WITH LOSS OF CONSCIOUSNESS OF 30 MINUTES OR LESS, INITIAL ENCOUNTER: ICD-10-CM

## 2022-03-10 PROCEDURE — 250N000013 HC RX MED GY IP 250 OP 250 PS 637: Performed by: STUDENT IN AN ORGANIZED HEALTH CARE EDUCATION/TRAINING PROGRAM

## 2022-03-10 PROCEDURE — 99284 EMERGENCY DEPT VISIT MOD MDM: CPT | Mod: 25

## 2022-03-10 PROCEDURE — 99284 EMERGENCY DEPT VISIT MOD MDM: CPT | Performed by: STUDENT IN AN ORGANIZED HEALTH CARE EDUCATION/TRAINING PROGRAM

## 2022-03-10 PROCEDURE — 70450 CT HEAD/BRAIN W/O DYE: CPT

## 2022-03-10 RX ORDER — ACETAMINOPHEN 325 MG/1
975 TABLET ORAL ONCE
Status: COMPLETED | OUTPATIENT
Start: 2022-03-10 | End: 2022-03-10

## 2022-03-10 RX ADMIN — ACETAMINOPHEN 975 MG: 325 TABLET, FILM COATED ORAL at 09:14
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
## 2022-03-10 NOTE — DISCHARGE INSTRUCTIONS
Return to the emergency department if you have worsening symptoms or new concerning symptoms you can use ibuprofen and Tylenol for pain control going forward.  You likely will have some concussion symptoms, would be reasonable to lay low for the next 24 to 48 hours and start adding back in activities such as mental activities and physical activities in a stepwise fashion after 24 to 48 hours as you can tolerate.  Follow-up with your primary care provider in the next week for any ongoing symptoms.
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
## 2022-07-22 ENCOUNTER — HOSPITAL ENCOUNTER (EMERGENCY)
Facility: HOSPITAL | Age: 27
Discharge: HOME | End: 2022-07-22
Attending: PHYSICIAN ASSISTANT | Admitting: PHYSICIAN ASSISTANT
Payer: COMMERCIAL

## 2022-07-22 VITALS
RESPIRATION RATE: 16 BRPM | OXYGEN SATURATION: 96 % | TEMPERATURE: 97.6 F | SYSTOLIC BLOOD PRESSURE: 119 MMHG | HEART RATE: 111 BPM | DIASTOLIC BLOOD PRESSURE: 77 MMHG

## 2022-07-22 DIAGNOSIS — L03.114 CELLULITIS OF LEFT WRIST: ICD-10-CM

## 2022-07-22 DIAGNOSIS — T63.481A INSECT STING: ICD-10-CM

## 2022-07-22 DIAGNOSIS — T63.484A INSECT STINGS, UNDETERMINED INTENT, INITIAL ENCOUNTER: ICD-10-CM

## 2022-07-22 PROCEDURE — 99213 OFFICE O/P EST LOW 20 MIN: CPT | Performed by: PHYSICIAN ASSISTANT

## 2022-07-22 PROCEDURE — G0463 HOSPITAL OUTPT CLINIC VISIT: HCPCS

## 2022-07-22 RX ORDER — CEFADROXIL 500 MG/1
500 CAPSULE ORAL 2 TIMES DAILY
Qty: 14 CAPSULE | Refills: 0 | Status: SHIPPED | OUTPATIENT
Start: 2022-07-22 | End: 2022-07-29

## 2022-07-22 ASSESSMENT — ENCOUNTER SYMPTOMS: COLOR CHANGE: 1

## 2022-07-22 NOTE — ED TRIAGE NOTES
Patient presents after being stung or bit by something yesterday on his wrist. Wrist is swollen, red and it burns/itches per patient.

## 2022-07-22 NOTE — ED TRIAGE NOTES
Patient presents to urgent care for possible bee sting or an insect bite of the left wrist yesterday.  Wrist is swollen, red, burns and itches per patient. Patient has not taken or used anything on the wrist.

## 2022-07-22 NOTE — ED PROVIDER NOTES
History     Chief Complaint   Patient presents with     Insect Bite     HPI  Aaron Conner is a 26 year old male who presents to urgent care for evaluation of pain, swelling and redness in left wrist.  Patient states that yesterday he was stung by what he thinks was some sort of bee.  He has had increased redness, swelling, pain, along with some itching since yesterday.  Patient denies any fevers, chills, malaise, mechanism of injury or any other associated symptoms.    Allergies:  No Known Allergies    Problem List:    Patient Active Problem List    Diagnosis Date Noted     Calculus of kidney 10/15/2019     Priority: Medium        Past Medical History:    Past Medical History:   Diagnosis Date     Allergic rhinitis, cause unspecified 12/10/2010     Anxiety 3/28/2011     Cephalgia 5/14/2012     Chronic tonsillitis 5/14/2012     Peripheral vertigo, unspecified 3/14/2011       Past Surgical History:    Past Surgical History:   Procedure Laterality Date     APPENDECTOMY      appendicitis     foreign  body removed from right long finger  1994     maxillary antrostomy  5/2011    LT       Family History:    Family History   Problem Relation Age of Onset     Cancer Maternal Grandfather         lung     Cancer Mother         ovarian       Social History:  Marital Status:  Single [1]  Social History     Tobacco Use     Smoking status: Former Smoker     Packs/day: 1.00     Types: Cigarettes     Smokeless tobacco: Current User     Types: Chew   Substance Use Topics     Alcohol use: Yes     Comment: social     Drug use: No        Medications:    budesonide (PULMICORT) 0.5 MG/2ML neb solution  cefadroxil (DURICEF) 500 MG capsule  mometasone (NASONEX) 50 MCG/ACT nasal spray          Review of Systems   Skin: Positive for color change.   All other systems reviewed and are negative.      Physical Exam   BP: 119/77  Pulse: 111  Temp: 97.6  F (36.4  C)  Resp: 16  SpO2: 96 %      Physical Exam  Vitals and nursing note reviewed.    Constitutional:       General: He is not in acute distress.     Appearance: Normal appearance. He is not ill-appearing or toxic-appearing.   Cardiovascular:      Rate and Rhythm: Regular rhythm.      Heart sounds: Normal heart sounds.   Pulmonary:      Breath sounds: Normal breath sounds.   Skin:            Comments: Mild swelling, warmth, erythema present on anterior aspect of left wrist.  Patient does have full range of motion, strength 5 out of 5, CMS intact   Neurological:      Mental Status: He is oriented to person, place, and time.         ED Course                 Procedures             Critical Care time:               No results found for this or any previous visit (from the past 24 hour(s)).    Medications - No data to display    Assessments & Plan (with Medical Decision Making)   #1.  Cellulitis of left wrist  #2.  Insect sting, left wrist    Discussed exam findings with patient.  Patient is prescribed course of cefadroxil to cover for any bacterial component.  He is instructed to return to emergency department immediately with any spreading of redness up his arm.  He is instructed to utilize cold packs and antihistamines over-the-counter as directed for pruritus.  Any additional concerns please return to urgent care or follow-up with primary care provider.  Patient verbalized understanding and agreement of plan      I have reviewed the nursing notes.    I have reviewed the findings, diagnosis, plan and need for follow up with the patient.    New Prescriptions    CEFADROXIL (DURICEF) 500 MG CAPSULE    Take 1 capsule (500 mg) by mouth 2 times daily for 7 days       Final diagnoses:   Insect sting   Cellulitis of left wrist       7/22/2022   [facility]     Robin Ortiz PA-C  07/22/22 3316

## 2022-12-12 ENCOUNTER — OFFICE VISIT (OUTPATIENT)
Dept: FAMILY MEDICINE | Facility: OTHER | Age: 27
End: 2022-12-12
Attending: FAMILY MEDICINE
Payer: COMMERCIAL

## 2022-12-12 VITALS
OXYGEN SATURATION: 99 % | BODY MASS INDEX: 23.63 KG/M2 | WEIGHT: 142 LBS | SYSTOLIC BLOOD PRESSURE: 136 MMHG | HEART RATE: 83 BPM | DIASTOLIC BLOOD PRESSURE: 77 MMHG | RESPIRATION RATE: 16 BRPM | TEMPERATURE: 97.3 F

## 2022-12-12 DIAGNOSIS — R45.87 IMPULSIVE: ICD-10-CM

## 2022-12-12 DIAGNOSIS — F43.23 ADJUSTMENT DISORDER WITH MIXED ANXIETY AND DEPRESSED MOOD: Primary | ICD-10-CM

## 2022-12-12 DIAGNOSIS — R41.840 POOR CONCENTRATION: ICD-10-CM

## 2022-12-12 PROCEDURE — G0463 HOSPITAL OUTPT CLINIC VISIT: HCPCS

## 2022-12-12 PROCEDURE — 99213 OFFICE O/P EST LOW 20 MIN: CPT | Performed by: FAMILY MEDICINE

## 2022-12-12 RX ORDER — BUPROPION HYDROCHLORIDE 150 MG/1
150 TABLET ORAL EVERY MORNING
Qty: 30 TABLET | Refills: 1 | Status: SHIPPED | OUTPATIENT
Start: 2022-12-12 | End: 2023-11-18

## 2022-12-12 ASSESSMENT — PAIN SCALES - GENERAL: PAINLEVEL: NO PAIN (0)

## 2022-12-12 NOTE — PROGRESS NOTES
Assessment & Plan     Adjustment disorder with mixed anxiety and depressed mood  Will treat for the above though may have ADHD ----Wellbutrin will help. Discussed. R/B discussed. Follow-up in 2-3 weeks and consider increase med if needed. Wait for ADHD testing .   - buPROPion (WELLBUTRIN XL) 150 MG 24 hr tablet; Take 1 tablet (150 mg) by mouth every morning    Poor concentration  Impulsive  Will have him get tested for ADHD. Pt agrees. Will treat with above for now.   - Adult Mental Health  Referral; Future                 No follow-ups on file.    Osmel Salas MD  [facility] - [place]ERETT Walker is a 26 year old, presenting for the following health issues:  Recheck Medication      HPI     ADHD    Onset: since childhood, worsening the past 2 years    Description:   Easily distracted: YES  Short attention span: YES  Trouble following directions: YES   Impulsive behavior: YES   Trouble completing tasks: No    Accompanying Signs & Symptoms:        Change in sleep pattern: sleeps about 6-7 hours at night--- sleeps ok -- MN to 6-7 am   Irritability at certain times of the day: No  Socially withdrawn: YES  Depression symptoms: YES- unmotivated  Anxiety symptoms: YES    History:  Caffeine intake: Moderate- seems like helps him focus  Loss of appetite: No  Healthy diet: No  Did you have problems in school or with previous employment: YES  Family history of ADHD: YES--not diagnosed   Have you had an evaluation for ADHD in the past: No  Do you use alcohol or drugs: YES- currently uses marijuana --- smoke daily at night     Therapies tried: none     No Dx of ADHD  Never been on meds for ADHD    Does not feel depressed but rarely and episodic  Pt is more on the anxious side most times    Very impulsive/ hard time focusing / gets distracted easily/ misplaces and loosing things  Hard time in school- main issues paying attention -- did get his GED  \  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      Objective    /77 (BP Location: Right arm, Patient Position: Sitting, Cuff Size: Adult Regular)   Pulse 83   Temp 97.3  F (36.3  C) (Tympanic)   Resp 16   Wt 64.4 kg (142 lb)   SpO2 99%   BMI 23.63 kg/m    Body mass index is 23.63 kg/m .  Physical Exam   GENERAL: healthy, alert and no distress  PSYCH: mentation appears normal, affect normal/bright

## 2023-11-18 ENCOUNTER — APPOINTMENT (OUTPATIENT)
Dept: CT IMAGING | Facility: HOSPITAL | Age: 28
End: 2023-11-18
Attending: EMERGENCY MEDICINE
Payer: COMMERCIAL

## 2023-11-18 ENCOUNTER — HOSPITAL ENCOUNTER (EMERGENCY)
Facility: HOSPITAL | Age: 28
Discharge: HOME | End: 2023-11-18
Attending: EMERGENCY MEDICINE | Admitting: EMERGENCY MEDICINE
Payer: COMMERCIAL

## 2023-11-18 VITALS
BODY MASS INDEX: 24.96 KG/M2 | OXYGEN SATURATION: 97 % | WEIGHT: 150 LBS | RESPIRATION RATE: 22 BRPM | DIASTOLIC BLOOD PRESSURE: 70 MMHG | TEMPERATURE: 98.3 F | SYSTOLIC BLOOD PRESSURE: 143 MMHG | HEART RATE: 94 BPM

## 2023-11-18 DIAGNOSIS — N20.1 URETEROLITHIASIS: ICD-10-CM

## 2023-11-18 LAB
ALBUMIN UR-MCNC: 70 MG/DL
ANION GAP SERPL CALCULATED.3IONS-SCNC: 12 MMOL/L (ref 7–15)
APPEARANCE UR: ABNORMAL
BASOPHILS # BLD AUTO: 0 10E3/UL (ref 0–0.2)
BASOPHILS NFR BLD AUTO: 0 %
BILIRUB UR QL STRIP: NEGATIVE
BUN SERPL-MCNC: 16.9 MG/DL (ref 6–20)
CALCIUM SERPL-MCNC: 9.5 MG/DL (ref 8.6–10)
CHLORIDE SERPL-SCNC: 103 MMOL/L (ref 98–107)
COLOR UR AUTO: YELLOW
CREAT SERPL-MCNC: 1.17 MG/DL (ref 0.67–1.17)
DEPRECATED HCO3 PLAS-SCNC: 26 MMOL/L (ref 22–29)
EGFRCR SERPLBLD CKD-EPI 2021: 87 ML/MIN/1.73M2
EOSINOPHIL # BLD AUTO: 0.1 10E3/UL (ref 0–0.7)
EOSINOPHIL NFR BLD AUTO: 1 %
ERYTHROCYTE [DISTWIDTH] IN BLOOD BY AUTOMATED COUNT: 12.2 % (ref 10–15)
GLUCOSE SERPL-MCNC: 91 MG/DL (ref 70–99)
GLUCOSE UR STRIP-MCNC: NEGATIVE MG/DL
HCT VFR BLD AUTO: 42.9 % (ref 40–53)
HGB BLD-MCNC: 15.3 G/DL (ref 13.3–17.7)
HGB UR QL STRIP: ABNORMAL
HOLD SPECIMEN: NORMAL
IMM GRANULOCYTES # BLD: 0 10E3/UL
IMM GRANULOCYTES NFR BLD: 0 %
KETONES UR STRIP-MCNC: NEGATIVE MG/DL
LEUKOCYTE ESTERASE UR QL STRIP: ABNORMAL
LYMPHOCYTES # BLD AUTO: 1.4 10E3/UL (ref 0.8–5.3)
LYMPHOCYTES NFR BLD AUTO: 11 %
MCH RBC QN AUTO: 32.3 PG (ref 26.5–33)
MCHC RBC AUTO-ENTMCNC: 35.7 G/DL (ref 31.5–36.5)
MCV RBC AUTO: 91 FL (ref 78–100)
MONOCYTES # BLD AUTO: 1.1 10E3/UL (ref 0–1.3)
MONOCYTES NFR BLD AUTO: 8 %
MUCOUS THREADS #/AREA URNS LPF: PRESENT /LPF
NEUTROPHILS # BLD AUTO: 9.8 10E3/UL (ref 1.6–8.3)
NEUTROPHILS NFR BLD AUTO: 80 %
NITRATE UR QL: NEGATIVE
NRBC # BLD AUTO: 0 10E3/UL
NRBC BLD AUTO-RTO: 0 /100
PH UR STRIP: 7.5 [PH] (ref 4.7–8)
PLATELET # BLD AUTO: 266 10E3/UL (ref 150–450)
POTASSIUM SERPL-SCNC: 3.8 MMOL/L (ref 3.4–5.3)
RBC # BLD AUTO: 4.73 10E6/UL (ref 4.4–5.9)
RBC URINE: >182 /HPF
SODIUM SERPL-SCNC: 141 MMOL/L (ref 135–145)
SP GR UR STRIP: 1.03 (ref 1–1.03)
SQUAMOUS EPITHELIAL: 0 /HPF
UROBILINOGEN UR STRIP-MCNC: NORMAL MG/DL
WBC # BLD AUTO: 12.4 10E3/UL (ref 4–11)
WBC URINE: 22 /HPF

## 2023-11-18 PROCEDURE — 85025 COMPLETE CBC W/AUTO DIFF WBC: CPT | Performed by: EMERGENCY MEDICINE

## 2023-11-18 PROCEDURE — 99284 EMERGENCY DEPT VISIT MOD MDM: CPT | Mod: 25

## 2023-11-18 PROCEDURE — 74176 CT ABD & PELVIS W/O CONTRAST: CPT

## 2023-11-18 PROCEDURE — 99284 EMERGENCY DEPT VISIT MOD MDM: CPT | Performed by: EMERGENCY MEDICINE

## 2023-11-18 PROCEDURE — 87086 URINE CULTURE/COLONY COUNT: CPT | Performed by: EMERGENCY MEDICINE

## 2023-11-18 PROCEDURE — 82310 ASSAY OF CALCIUM: CPT | Performed by: EMERGENCY MEDICINE

## 2023-11-18 PROCEDURE — 81001 URINALYSIS AUTO W/SCOPE: CPT | Performed by: EMERGENCY MEDICINE

## 2023-11-18 PROCEDURE — 36415 COLL VENOUS BLD VENIPUNCTURE: CPT | Performed by: EMERGENCY MEDICINE

## 2023-11-18 RX ORDER — ONDANSETRON 4 MG/1
4 TABLET, ORALLY DISINTEGRATING ORAL EVERY 6 HOURS PRN
Qty: 20 TABLET | Refills: 0 | Status: SHIPPED | OUTPATIENT
Start: 2023-11-18

## 2023-11-18 RX ORDER — OXYCODONE HYDROCHLORIDE 5 MG/1
5 TABLET ORAL EVERY 6 HOURS PRN
Qty: 12 TABLET | Refills: 0 | Status: SHIPPED | OUTPATIENT
Start: 2023-11-18 | End: 2023-11-21

## 2023-11-18 ASSESSMENT — ACTIVITIES OF DAILY LIVING (ADL): ADLS_ACUITY_SCORE: 35

## 2023-11-18 NOTE — ED TRIAGE NOTES
Pt in for evaluation of left sided flank pain and discolored urine the last week. Has history of kidney stones in the past. Was evaluated by [facility] provider Ruba YOUNG who recommended pt be seen in the ED.      Taking ibuprofen at home with some relief.

## 2023-11-18 NOTE — ED PROVIDER NOTES
EMERGENCY DEPARTMENT ENCOUNTER      NAME: Aaron Conner  AGE: 27 year old male  YOB: 1995  MRN: [id number]  EVALUATION DATE & TIME: 2023  1:49 PM    PCP: Osmel Salas    ED PROVIDER: Venancio Syed M.D.      Chief Complaint   Patient presents with    Flank Pain         FINAL IMPRESSION:  1. Ureterolithiasis          ED COURSE & MEDICAL DECISION MAKIN year old male presents to the Emergency Department for evaluation of left flank pain.  History of kidney stone disease, most recently having been seen in 2019.  Presenting with left flank pain x1 week.  CT today does show a obstructing 7 mm proximal ureteral stone with some mild hydronephrosis.  Urine analysis looks consistent with expected hematuria, a slight degree of pyuria but not appearing consistent with infection.  Basic labs are stable.  He does have a mild leukocytosis but I think is probably reactive.  No history of fever.  No suggestive of an infected stone on his evaluation today.  Patient was resting comfortably here and declined pain medication.  We discussed a plan to have him follow-up with urology, [facility] urology information is probably the most convenient for him at this time so he was given this clinic follow-up number.  Return precautions were reviewed including for fever or intractable vomiting.  Patient was discharged in stable condition.    At the conclusion of the encounter I discussed the results of all of the tests and the disposition. The questions were answered. The patient or family acknowledged understanding and was agreeable with the care plan.         MEDICATIONS GIVEN IN THE EMERGENCY:  Medications - No data to display    NEW PRESCRIPTIONS STARTED AT TODAY'S ER VISIT  New Prescriptions    ONDANSETRON (ZOFRAN ODT) 4 MG ODT TAB    Take 1 tablet (4 mg) by mouth every 6 hours as needed for nausea or vomiting    OXYCODONE (ROXICODONE) 5 MG TABLET    Take 1 tablet (5 mg) by mouth every 6 hours as needed for  pain          =================================================================    HPI    Patient information was obtained from: Patient      Aaron Conner is a 27 year old male with a pertinent history of kidney stones who presents to this ED today for evaluation of left flank pain.  Patient says he has had pain on the left flank for the last week.  Occasionally radiates to the right flank as well.  Reports an episode of chills yesterday.  No fever.  1 episode of nausea and vomiting with the pain.  He denies abdominal pain.  Notes some dark bloody discoloration to his urine.  Has a history of kidney stone disease.  Also has had a prior appendectomy.      REVIEW OF SYSTEMS   All systems reviewed and negative except as noted in HPI.    PAST MEDICAL HISTORY:  Past Medical History:   Diagnosis Date    Allergic rhinitis, cause unspecified 12/10/2010    Anxiety 3/28/2011    Cephalgia 5/14/2012    Chronic tonsillitis 5/14/2012    Peripheral vertigo, unspecified 3/14/2011       PAST SURGICAL HISTORY:  Past Surgical History:   Procedure Laterality Date    APPENDECTOMY      appendicitis    foreign  body removed from right long finger  1994    maxillary antrostomy  5/2011    LT           CURRENT MEDICATIONS:    No current facility-administered medications for this encounter.     Current Outpatient Medications   Medication    ondansetron (ZOFRAN ODT) 4 MG ODT tab    oxyCODONE (ROXICODONE) 5 MG tablet         ALLERGIES:  No Known Allergies    FAMILY HISTORY:  Family History   Problem Relation Age of Onset    Cancer Mother         ovarian    Eye Disorder Mother     Cancer Maternal Grandfather         lung       SOCIAL HISTORY:   Social History     Socioeconomic History    Marital status: Single     Spouse name: None    Number of children: None    Years of education: None    Highest education level: None   Tobacco Use    Smoking status: Former     Packs/day: 1     Types: Cigarettes    Smokeless tobacco: Current     Types: Chew    Vaping Use    Vaping Use: Some days    Substances: Nicotine   Substance and Sexual Activity    Alcohol use: Yes     Comment: social    Drug use: No    Sexual activity: Yes     Partners: Female     Birth control/protection: None   Other Topics Concern    Caffeine Concern Yes     Comment: soda 2 cups daily    Seat Belt Yes       VITALS:  /91   Pulse 90   Temp 98.3  F (36.8  C) (Tympanic)   Resp 22   Wt 68 kg (150 lb)   SpO2 97%   BMI 24.96 kg/m      PHYSICAL EXAM    Constitutional: Well developed, Well nourished, NAD.  HENT: Normocephalic, Atraumatic. Neck Supple.  Eyes: EOMI, Conjunctiva normal.  Respiratory: Breathing comfortably on room air. Speaks full sentences easily. Lungs clear to ascultation.  Cardiovascular: Normal heart rate, Regular rhythm. No peripheral edema.  Abdomen: Soft, nontender, L CVA tenderness  Musculoskeletal: Good range of motion in all major joints. No major deformities noted.  Integument: Warm, Dry.  Neurologic: Alert & awake, Normal motor function, Normal sensory function, No focal deficits noted.   Psychiatric: Cooperative. Affect appropriate.     LAB:  All pertinent labs reviewed and interpreted.  Labs Ordered and Resulted from Time of ED Arrival to Time of ED Departure   ROUTINE UA WITH MICROSCOPIC REFLEX TO CULTURE - Abnormal       Result Value    Color Urine Yellow      Appearance Urine Slightly Cloudy (*)     Glucose Urine Negative      Bilirubin Urine Negative      Ketones Urine Negative      Specific Gravity Urine 1.033      Blood Urine Large (*)     pH Urine 7.5      Protein Albumin Urine 70 (*)     Urobilinogen Urine Normal      Nitrite Urine Negative      Leukocyte Esterase Urine Small (*)     Mucus Urine Present (*)     RBC Urine >182 (*)     WBC Urine 22 (*)     Squamous Epithelials Urine 0     CBC WITH PLATELETS AND DIFFERENTIAL - Abnormal    WBC Count 12.4 (*)     RBC Count 4.73      Hemoglobin 15.3      Hematocrit 42.9      MCV 91      MCH 32.3      MCHC 35.7       RDW 12.2      Platelet Count 266      % Neutrophils 80      % Lymphocytes 11      % Monocytes 8      % Eosinophils 1      % Basophils 0      % Immature Granulocytes 0      NRBCs per 100 WBC 0      Absolute Neutrophils 9.8 (*)     Absolute Lymphocytes 1.4      Absolute Monocytes 1.1      Absolute Eosinophils 0.1      Absolute Basophils 0.0      Absolute Immature Granulocytes 0.0      Absolute NRBCs 0.0     BASIC METABOLIC PANEL - Normal    Sodium 141      Potassium 3.8      Chloride 103      Carbon Dioxide (CO2) 26      Anion Gap 12      Urea Nitrogen 16.9      Creatinine 1.17      GFR Estimate 87      Calcium 9.5      Glucose 91     URINE CULTURE       RADIOLOGY:  Reviewed all pertinent imaging. Please see official radiology report.  Abd/pelvis CT - no contrast - Stone Protocol   Final Result   Impression: Proximal left ureteral calculus measuring 7 x 4 mm at the   level of L3-L4. There is mild left-sided hydronephrosis.      There are small bilateral nonobstructing renal calculi, one in each   kidney at this time.      HIMANSHU BECKFORD MD            SYSTEM ID:  [id number]              Venancio Syed M.D.  Emergency Medicine  [facility]  [address] [place] [id number] [phone]  Dept: [phone]       Venancio Syed MD  11/18/23 [id number]

## 2023-11-18 NOTE — ED NOTES
Pt reports left flank pain for the past week, sometimes CRISELDA. Pt states he's noticed his urine has been dark and cloudy and on occasions pink with small clots. Pt states burning with urination this morning. Pt has a hx of kidney stones. Pt denies sexual activity in the past month. Previous partners no protection. Pt denies any recent groin or testicular injury, no itching, no penile discharge, no prostate hx. Pt denies fevers, some chills, no NVD. No CVA tenderness on palpation.

## 2023-11-18 NOTE — DISCHARGE INSTRUCTIONS
You were seen in the emergency department for a left-sided flank pain.  Your CT scan does show a moderate-sized (7mm) kidney stone in your left ureter.  There was no signs of complication like infection.  We would recommend following up with a urologist.  There is a good chance that you will need a procedure to help with passage of this kidney stone.  We attached the information for [facility] urology.  We would suggest calling them today or tomorrow to set up follow-up.  We are going to prescribe you pain medications.  You can use Tylenol 650 mg and ibuprofen 600 mg every 6 hours for initial pain management.  Finally you can use oxycodone for breakthrough severe pain especially at nighttime.  You can use Zofran for any further nausea.  If you have any other immediate concerns like fever or intractable vomiting we can reevaluate as needed in the emergency department.  Otherwise urology follow-up as soon as possible will be recommended after this ED visit for kidney stone.

## 2023-11-20 LAB — BACTERIA UR CULT: NO GROWTH

## 2024-01-15 ENCOUNTER — APPOINTMENT (OUTPATIENT)
Dept: GENERAL RADIOLOGY | Facility: HOSPITAL | Age: 29
End: 2024-01-15
Attending: NURSE PRACTITIONER
Payer: COMMERCIAL

## 2024-01-15 ENCOUNTER — HOSPITAL ENCOUNTER (EMERGENCY)
Facility: HOSPITAL | Age: 29
Discharge: HOME | End: 2024-01-15
Attending: NURSE PRACTITIONER | Admitting: NURSE PRACTITIONER
Payer: COMMERCIAL

## 2024-01-15 VITALS
RESPIRATION RATE: 16 BRPM | OXYGEN SATURATION: 98 % | HEART RATE: 107 BPM | SYSTOLIC BLOOD PRESSURE: 136 MMHG | DIASTOLIC BLOOD PRESSURE: 87 MMHG | TEMPERATURE: 98.8 F

## 2024-01-15 DIAGNOSIS — J20.8 VIRAL BRONCHITIS: ICD-10-CM

## 2024-01-15 DIAGNOSIS — K30 UPSET STOMACH: ICD-10-CM

## 2024-01-15 LAB
ALBUMIN SERPL BCG-MCNC: 5.1 G/DL (ref 3.5–5.2)
ALP SERPL-CCNC: 53 U/L (ref 40–150)
ALT SERPL W P-5'-P-CCNC: 12 U/L (ref 0–70)
ANION GAP SERPL CALCULATED.3IONS-SCNC: 14 MMOL/L (ref 7–15)
AST SERPL W P-5'-P-CCNC: 18 U/L (ref 0–45)
BASOPHILS # BLD AUTO: 0 10E3/UL (ref 0–0.2)
BASOPHILS NFR BLD AUTO: 0 %
BILIRUB SERPL-MCNC: 0.7 MG/DL
BUN SERPL-MCNC: 12.5 MG/DL (ref 6–20)
CALCIUM SERPL-MCNC: 10 MG/DL (ref 8.6–10)
CHLORIDE SERPL-SCNC: 99 MMOL/L (ref 98–107)
CREAT SERPL-MCNC: 1 MG/DL (ref 0.67–1.17)
DEPRECATED HCO3 PLAS-SCNC: 24 MMOL/L (ref 22–29)
EGFRCR SERPLBLD CKD-EPI 2021: >90 ML/MIN/1.73M2
EOSINOPHIL # BLD AUTO: 0 10E3/UL (ref 0–0.7)
EOSINOPHIL NFR BLD AUTO: 0 %
ERYTHROCYTE [DISTWIDTH] IN BLOOD BY AUTOMATED COUNT: 11.6 % (ref 10–15)
FLUAV RNA SPEC QL NAA+PROBE: NEGATIVE
FLUBV RNA RESP QL NAA+PROBE: NEGATIVE
GLUCOSE SERPL-MCNC: 103 MG/DL (ref 70–99)
HCT VFR BLD AUTO: 45.6 % (ref 40–53)
HGB BLD-MCNC: 15.7 G/DL (ref 13.3–17.7)
HOLD SPECIMEN: NORMAL
IMM GRANULOCYTES # BLD: 0 10E3/UL
IMM GRANULOCYTES NFR BLD: 0 %
LIPASE SERPL-CCNC: 16 U/L (ref 13–60)
LYMPHOCYTES # BLD AUTO: 1 10E3/UL (ref 0.8–5.3)
LYMPHOCYTES NFR BLD AUTO: 9 %
MCH RBC QN AUTO: 30.7 PG (ref 26.5–33)
MCHC RBC AUTO-ENTMCNC: 34.4 G/DL (ref 31.5–36.5)
MCV RBC AUTO: 89 FL (ref 78–100)
MONOCYTES # BLD AUTO: 0.6 10E3/UL (ref 0–1.3)
MONOCYTES NFR BLD AUTO: 5 %
NEUTROPHILS # BLD AUTO: 9.8 10E3/UL (ref 1.6–8.3)
NEUTROPHILS NFR BLD AUTO: 86 %
NRBC # BLD AUTO: 0 10E3/UL
NRBC BLD AUTO-RTO: 0 /100
PLATELET # BLD AUTO: 280 10E3/UL (ref 150–450)
POTASSIUM SERPL-SCNC: 3.6 MMOL/L (ref 3.4–5.3)
PROT SERPL-MCNC: 7.3 G/DL (ref 6.4–8.3)
RBC # BLD AUTO: 5.12 10E6/UL (ref 4.4–5.9)
RSV RNA SPEC NAA+PROBE: NEGATIVE
SARS-COV-2 RNA RESP QL NAA+PROBE: NEGATIVE
SODIUM SERPL-SCNC: 137 MMOL/L (ref 135–145)
WBC # BLD AUTO: 11.5 10E3/UL (ref 4–11)

## 2024-01-15 PROCEDURE — 93005 ELECTROCARDIOGRAM TRACING: CPT

## 2024-01-15 PROCEDURE — 83690 ASSAY OF LIPASE: CPT | Performed by: NURSE PRACTITIONER

## 2024-01-15 PROCEDURE — 250N000009 HC RX 250: Performed by: NURSE PRACTITIONER

## 2024-01-15 PROCEDURE — 99284 EMERGENCY DEPT VISIT MOD MDM: CPT | Performed by: NURSE PRACTITIONER

## 2024-01-15 PROCEDURE — 36415 COLL VENOUS BLD VENIPUNCTURE: CPT | Performed by: NURSE PRACTITIONER

## 2024-01-15 PROCEDURE — 80053 COMPREHEN METABOLIC PANEL: CPT | Performed by: NURSE PRACTITIONER

## 2024-01-15 PROCEDURE — 87637 SARSCOV2&INF A&B&RSV AMP PRB: CPT | Performed by: NURSE PRACTITIONER

## 2024-01-15 PROCEDURE — 99285 EMERGENCY DEPT VISIT HI MDM: CPT | Mod: 25

## 2024-01-15 PROCEDURE — 87637 SARSCOV2&INF A&B&RSV AMP PRB: CPT | Performed by: INTERNAL MEDICINE

## 2024-01-15 PROCEDURE — 71046 X-RAY EXAM CHEST 2 VIEWS: CPT

## 2024-01-15 PROCEDURE — 93010 ELECTROCARDIOGRAM REPORT: CPT | Performed by: INTERNAL MEDICINE

## 2024-01-15 PROCEDURE — 250N000013 HC RX MED GY IP 250 OP 250 PS 637: Performed by: NURSE PRACTITIONER

## 2024-01-15 PROCEDURE — 85025 COMPLETE CBC W/AUTO DIFF WBC: CPT | Performed by: NURSE PRACTITIONER

## 2024-01-15 RX ORDER — MAGNESIUM HYDROXIDE/ALUMINUM HYDROXICE/SIMETHICONE 120; 1200; 1200 MG/30ML; MG/30ML; MG/30ML
15 SUSPENSION ORAL ONCE
Status: COMPLETED | OUTPATIENT
Start: 2024-01-15 | End: 2024-01-15

## 2024-01-15 RX ORDER — LIDOCAINE HYDROCHLORIDE 20 MG/ML
10 SOLUTION OROPHARYNGEAL ONCE
Status: COMPLETED | OUTPATIENT
Start: 2024-01-15 | End: 2024-01-15

## 2024-01-15 RX ADMIN — LIDOCAINE HYDROCHLORIDE 10 ML: 20 SOLUTION ORAL at 21:46

## 2024-01-15 RX ADMIN — ALUMINUM HYDROXIDE, MAGNESIUM HYDROXIDE, AND SIMETHICONE 15 ML: 200; 200; 20 SUSPENSION ORAL at 21:46

## 2024-01-15 ASSESSMENT — ENCOUNTER SYMPTOMS
PSYCHIATRIC NEGATIVE: 1
DIARRHEA: 1
FATIGUE: 1
EYES NEGATIVE: 1
HEMATOLOGIC/LYMPHATIC NEGATIVE: 1
ABDOMINAL PAIN: 1
VOMITING: 1
NAUSEA: 1
COUGH: 1
NEUROLOGICAL NEGATIVE: 1
CARDIOVASCULAR NEGATIVE: 1
MUSCULOSKELETAL NEGATIVE: 1
ALLERGIC/IMMUNOLOGIC NEGATIVE: 1
APPETITE CHANGE: 1
ENDOCRINE NEGATIVE: 1
BLOOD IN STOOL: 0
CONSTIPATION: 0

## 2024-01-15 ASSESSMENT — ACTIVITIES OF DAILY LIVING (ADL)
ADLS_ACUITY_SCORE: 33
ADLS_ACUITY_SCORE: 35

## 2024-01-16 LAB
ATRIAL RATE - MUSE: 101 BPM
DIASTOLIC BLOOD PRESSURE - MUSE: NORMAL MMHG
INTERPRETATION ECG - MUSE: NORMAL
P AXIS - MUSE: 77 DEGREES
PR INTERVAL - MUSE: 124 MS
QRS DURATION - MUSE: 88 MS
QT - MUSE: 332 MS
QTC - MUSE: 430 MS
R AXIS - MUSE: 63 DEGREES
SYSTOLIC BLOOD PRESSURE - MUSE: NORMAL MMHG
T AXIS - MUSE: 75 DEGREES
VENTRICULAR RATE- MUSE: 101 BPM

## 2024-01-16 NOTE — ED TRIAGE NOTES
"Patient presents with c/o feeling weak, nasal congestion, slight cough, tired/fatigue, decreased appetite, dizzy, diaphoresis, emesis X1, X 4 days. Reports when he coughs \"left side of chest hurts and is tight.\". Breathing deep, coughing, and direct pressure to left side of chest, increases pain.         "

## 2024-01-16 NOTE — DISCHARGE INSTRUCTIONS
Try famotidine 20 mg twice daily to see if you have improvement of symptoms.  You may continue the Tums with this.    Follow-up with your primary care provider for evaluation.  Possible follow-up with general surgery for EGD if continue to have coffee-ground emesis.      Clear liquids:   You should allow your bowel to rest.  Once able, starting hydrating by sipping on ice cold fluids.  The best way to do this is to start off with clear liquids.  The best choices for this is G2, Gatorade Zero, or PowerAde Zero.  Do not used sugary drinks.  If you want to use juice, start out by making it half strength by adding half water and half juice.  When able, advance your diet as tolerated.         Follow-up with your primary care provider for reevaluation.  Contact your primary care provider if you have any questions or concerns.  Do not hesitate to return to the ER if any new or worsening symptoms.     Please read the attached instructions (if any).  They highlight more specific treatments and interventions for you at home.              Thank you for letting me participate in your care and wish you a fast and uneventful recovery,    Kwame HARRISON CNP    Do not hesitate to contact me with questions or concerns.  [email or web address]  [email or web address]

## 2024-01-16 NOTE — ED PROVIDER NOTES
History     Chief Complaint   Patient presents with    Cough    Chest Wall Pain     HPI  Aaron Conner is a 28 year old for complaints of cough, fatigue, decreased appetite, nausea with vomiting, and coffee-ground emesis.  Patient states symptoms have been going on for about 4 days.  Now he is having coffee-ground emesis so comes in.  Patient denies any fever or chills.  Slight cough but no shortness of breath, wheezes, stridor.  States has not been able to eat.  Now is starting to throw up bile instead of coffee-ground's.  States has had diarrhea but no melena or hematochezia reported.  No dysuria reported.  No obvious abdominal pain.    Allergies:  No Known Allergies    Problem List:    Patient Active Problem List    Diagnosis Date Noted    Calculus of kidney 10/15/2019     Priority: Medium        Past Medical History:    Past Medical History:   Diagnosis Date    Allergic rhinitis, cause unspecified 12/10/2010    Anxiety 3/28/2011    Cephalgia 5/14/2012    Chronic tonsillitis 5/14/2012    Peripheral vertigo, unspecified 3/14/2011       Past Surgical History:    Past Surgical History:   Procedure Laterality Date    APPENDECTOMY      appendicitis    foreign  body removed from right long finger  1994    maxillary antrostomy  5/2011    LT       Family History:    Family History   Problem Relation Age of Onset    Cancer Mother         ovarian    Eye Disorder Mother     Cancer Maternal Grandfather         lung       Social History:  Marital Status:  Single [1]  Social History     Tobacco Use    Smoking status: Former     Packs/day: 1     Types: Cigarettes    Smokeless tobacco: Current     Types: Chew   Vaping Use    Vaping Use: Some days    Substances: Nicotine   Substance Use Topics    Alcohol use: Yes     Comment: social    Drug use: Yes     Types: Marijuana     Comment: Daily        Medications:    ondansetron (ZOFRAN ODT) 4 MG ODT tab          Review of Systems   Constitutional:  Positive for appetite change and  fatigue.   HENT: Negative.     Eyes: Negative.    Respiratory:  Positive for cough.    Cardiovascular: Negative.    Gastrointestinal:  Positive for abdominal pain, diarrhea, nausea and vomiting (Coffee-ground). Negative for blood in stool and constipation.   Endocrine: Negative.    Genitourinary: Negative.    Musculoskeletal: Negative.    Skin: Negative.    Allergic/Immunologic: Negative.    Neurological: Negative.    Hematological: Negative.    Psychiatric/Behavioral: Negative.         Physical Exam   BP: 133/80  Pulse: 107  Temp: 98.8  F (37.1  C)  Resp: 16  SpO2: 98 %      GENERAL APPEARANCE:  The patient is a 28 year old well-developed, well-nourished individual in no acute distress that appears as stated age.  NECK:  Supple.  Trachea is midline.    CHEST:  Symmetric.  Non-tender to palpation.  No crepitus or deformity.  LUNGS:  Breathing is easy.  Breath sounds are equal and clear bilaterally.  No wheezes, rhonchi, or rales.  HEART:  Regular rate and rhythm with normal S1 and S2.  No murmurs, gallops, or rubs.  ABDOMEN:  Soft, flat, and benign.  No mass, tenderness, guarding, or rebound.  No organomegaly or hernia.  Bowel sounds are present.  No CVA tenderness or flank mass.  No abdominal bruits or thrills present upon auscultation/palpation.  NEUROLOGIC:  No focal sensory or motor deficits are noted.   PSYCHIATRIC:  The patient is awake, alert, and oriented x4.  Recent and remote memory is intact.  Appropriate mood and affect.  Calm and cooperative with history and physical exam.  SKIN:  Warm, dry, and well perfused.  Good turgor.  No lesions, nodules, or rashes are noted.  No bruising noted.      Comment: Discrepancies between my note and notes on behalf of the nursing team or other care providers are secondary to my findings reflecting my physical examination and questioning of the patient.  Any conflicting information provided is not in line with my examination of the patient.       ED Course               ED Course as of 01/15/24 2246   Mon Humberto 15, 2024   1930 Lab ordered while patient in [facility].   1936 EKG 12-lead, tracing only  No STEMI noted   2117 Chest x-ray ordered   2131 In to see patient and history/physical completed.    2131 Labs and GI cocktail ordered.   2235 Patient is feeling improved after GI cocktail.  Lab work is benign.  Will discharge patient home.  Likely has a viral bronchitis/gastroenteritis.  Will discharge patient to do clear liquid diet.  Recommended doing Tums/famotidine and follow-up with PCP and possible general surgeon for EGD in the future.  Return if worsening.  Patient in agreement.            ECG:    ECG competed at 1932 and personally reviewed at 1936 showing sinus tachycardia with ventricular rate of 101 and QTc of 430.  Normal axis.  Normal ECG.  When compared to ECG from 7/23/2019, sinus bradycardia is now sinus tachycardia.  No other significant changes noted.         Results for orders placed or performed during the hospital encounter of 01/15/24 (from the past 24 hour(s))   Symptomatic Influenza A/B, RSV, & SARS-CoV2 PCR (COVID-19) Nasopharyngeal    Specimen: Nasopharyngeal; Swab   Result Value Ref Range    Influenza A PCR Negative Negative    Influenza B PCR Negative Negative    RSV PCR Negative Negative    SARS CoV2 PCR Negative Negative    Narrative    Testing was performed using the Xpert Xpress CoV2/Flu/RSV Assay on the Cepheid GeneXpert Instrument. This test should be ordered for the detection of SARS-CoV-2, influenza, and RSV viruses in individuals who meet clinical and/or epidemiological criteria. Test performance is unknown in asymptomatic patients. This test is for in vitro diagnostic use under the FDA EUA for laboratories certified under CLIA to perform high or moderate complexity testing. This test has not been FDA cleared or approved. A negative result does not rule out the presence of PCR inhibitors in the specimen or target RNA in concentration below the  limit of detection for the assay. If only one viral target is positive but coinfection with multiple targets is suspected, the sample should be re-tested with another FDA cleared, approved, or authorized test, if coinfection would change clinical management. This test was validated by the [facility] [employer]. These laboratories are certified under the Clinical Laboratory Improvement Amendments of 1988 (CLIA-88) as qualified to perform high complexity laboratory testing.   EKG 12-lead, tracing only   Result Value Ref Range    Systolic Blood Pressure  mmHg    Diastolic Blood Pressure  mmHg    Ventricular Rate 101 BPM    Atrial Rate 101 BPM    NM Interval 124 ms    QRS Duration 88 ms     ms    QTc 430 ms    P Axis 77 degrees    R AXIS 63 degrees    T Axis 75 degrees    Interpretation ECG       Sinus tachycardia  Otherwise normal ECG  No previous ECGs available     CBC with platelets differential    Narrative    The following orders were created for panel order CBC with platelets differential.  Procedure                               Abnormality         Status                     ---------                               -----------         ------                     CBC with platelets and d...[[id number]]  Abnormal            Final result                 Please view results for these tests on the individual orders.   Comprehensive metabolic panel   Result Value Ref Range    Sodium 137 135 - 145 mmol/L    Potassium 3.6 3.4 - 5.3 mmol/L    Carbon Dioxide (CO2) 24 22 - 29 mmol/L    Anion Gap 14 7 - 15 mmol/L    Urea Nitrogen 12.5 6.0 - 20.0 mg/dL    Creatinine 1.00 0.67 - 1.17 mg/dL    GFR Estimate >90 >60 mL/min/1.73m2    Calcium 10.0 8.6 - 10.0 mg/dL    Chloride 99 98 - 107 mmol/L    Glucose 103 (H) 70 - 99 mg/dL    Alkaline Phosphatase 53 40 - 150 U/L    AST 18 0 - 45 U/L    ALT 12 0 - 70 U/L    Protein Total 7.3 6.4 - 8.3 g/dL    Albumin 5.1 3.5 - 5.2 g/dL    Bilirubin Total 0.7 <=1.2 mg/dL   Lipase    Result Value Ref Range    Lipase 16 13 - 60 U/L   CBC with platelets and differential   Result Value Ref Range    WBC Count 11.5 (H) 4.0 - 11.0 10e3/uL    RBC Count 5.12 4.40 - 5.90 10e6/uL    Hemoglobin 15.7 13.3 - 17.7 g/dL    Hematocrit 45.6 40.0 - 53.0 %    MCV 89 78 - 100 fL    MCH 30.7 26.5 - 33.0 pg    MCHC 34.4 31.5 - 36.5 g/dL    RDW 11.6 10.0 - 15.0 %    Platelet Count 280 150 - 450 10e3/uL    % Neutrophils 86 %    % Lymphocytes 9 %    % Monocytes 5 %    % Eosinophils 0 %    % Basophils 0 %    % Immature Granulocytes 0 %    NRBCs per 100 WBC 0 <1 /100    Absolute Neutrophils 9.8 (H) 1.6 - 8.3 10e3/uL    Absolute Lymphocytes 1.0 0.8 - 5.3 10e3/uL    Absolute Monocytes 0.6 0.0 - 1.3 10e3/uL    Absolute Eosinophils 0.0 0.0 - 0.7 10e3/uL    Absolute Basophils 0.0 0.0 - 0.2 10e3/uL    Absolute Immature Granulocytes 0.0 <=0.4 10e3/uL    Absolute NRBCs 0.0 10e3/uL   Extra Tube    Narrative    The following orders were created for panel order Extra Tube.  Procedure                               Abnormality         Status                     ---------                               -----------         ------                     Extra Blue Top Tube[[id number]]                              In process                 Extra Red Top Tube[[id number]]                               In process                 Extra Heparinized Syringe[[id number]]                        Final result                 Please view results for these tests on the individual orders.   Extra Heparinized Syringe   Result Value Ref Range    Hold Specimen OK          Medications   alum & mag hydroxide-simethicone (MAALOX) suspension 15 mL (15 mLs Oral $Given 1/15/24 2146)   lidocaine (viscous) (XYLOCAINE) 2 % solution 10 mL (10 mLs Mouth/Throat $Given 1/15/24 2146)       Assessments & Plan (with Medical Decision Making)     I have reviewed the nursing notes.    I have reviewed the findings, diagnosis, plan and need for follow up with the  patient.      Summary:  Patient presents to the ER today for fatigue, nausea, vomiting, and cough..  Potential diagnosis which have been considered and evaluated include COVID, RSV, influenza, pneumonia, gastroenteritis, GI bleed, as well as others. Many of these have been excluded using the various modalities and assessment as noted on the chart. At the present time, the diagnosis given seems to be the most likely viral cough with likely viral gastritis.  Upon arrival, vitals signs show blood pressure 133/80 with a pulse of 107 but after 37.1  C.  Respirations 18 with oxygenation of 98% on room air.  The patient is alert and oriented no distress.  Cardiac and respiratory examination normal.  No abdominal tenderness noted to palpation.  No hernia or mass.  ECG done in [facility] and shows no acute normalities.  Influenza, RSV, COVID was conducted outpatient [facility] and is negative.  Chest x-ray personally reviewed showing no acute cardiopulmonary abnormalities.  Due to patient complaints of hematic emesis did do further lab work which shows WBC 11.5 with hemoglobin of 15.7.  Electrolytes, renal, hepatic functions normal.  Lipase normal at 16.  GI cocktail was given with some improvement of symptoms.  Lab work is benign.  Chest x-ray shows questionable viral reactive airway.  At this time patient likely has a viral illness that causes the cough and GI upset.  In regards to coffee-ground emesis, no vomiting.  In the ER.  It is possible patient has ulcer.  With normal hemoglobin patient is okay to be discharged.  Will have patient try famotidine 20 mg twice daily to see if this improves.  Follow-up with PCP in regards to this.  Possible general surgery outpatient evaluation for EGD.  Advised patient to return if worsening or any concerns, otherwise follow-up as described above.  Patient verbalized understand agrees to plan of care.  Patient discharged home.      Critical Care Time: None    Impression and plan discussed with  patient. Questions answered, concerns addressed, indications for urgent re-evaluation reviewed, and  given. Patient/Parent/Caregiver agree with treatment plan and have no further questions at this time.  AVS provided at discharge.    This note was created by the Dragon Voice Dictation System. Inadvertent typographical errors, due to software recognition problems, may still exist.             New Prescriptions    No medications on file       Final diagnoses:   Viral bronchitis   Upset stomach       1/15/2024   [facility]       Kwame Neumann, CHRISTY CNP  01/15/24 6410

## 2024-06-12 ENCOUNTER — HOSPITAL ENCOUNTER (EMERGENCY)
Facility: HOSPITAL | Age: 29
Discharge: HOME | End: 2024-06-12
Attending: PHYSICIAN ASSISTANT | Admitting: PHYSICIAN ASSISTANT
Payer: COMMERCIAL

## 2024-06-12 VITALS
WEIGHT: 147.49 LBS | OXYGEN SATURATION: 98 % | SYSTOLIC BLOOD PRESSURE: 132 MMHG | RESPIRATION RATE: 16 BRPM | BODY MASS INDEX: 24.57 KG/M2 | TEMPERATURE: 97.6 F | DIASTOLIC BLOOD PRESSURE: 90 MMHG | HEART RATE: 99 BPM | HEIGHT: 65 IN

## 2024-06-12 DIAGNOSIS — R20.2 TINGLING SENSATION: ICD-10-CM

## 2024-06-12 LAB
ANION GAP SERPL CALCULATED.3IONS-SCNC: 12 MMOL/L (ref 7–15)
BASOPHILS # BLD AUTO: 0.1 10E3/UL (ref 0–0.2)
BASOPHILS NFR BLD AUTO: 0 %
BUN SERPL-MCNC: 10 MG/DL (ref 6–20)
CALCIUM SERPL-MCNC: 9.5 MG/DL (ref 8.6–10)
CHLORIDE SERPL-SCNC: 99 MMOL/L (ref 98–107)
CREAT SERPL-MCNC: 1.09 MG/DL (ref 0.67–1.17)
DEPRECATED HCO3 PLAS-SCNC: 27 MMOL/L (ref 22–29)
EGFRCR SERPLBLD CKD-EPI 2021: >90 ML/MIN/1.73M2
EOSINOPHIL # BLD AUTO: 0.1 10E3/UL (ref 0–0.7)
EOSINOPHIL NFR BLD AUTO: 1 %
ERYTHROCYTE [DISTWIDTH] IN BLOOD BY AUTOMATED COUNT: 12.4 % (ref 10–15)
GLUCOSE SERPL-MCNC: 121 MG/DL (ref 70–99)
HCT VFR BLD AUTO: 44.9 % (ref 40–53)
HGB BLD-MCNC: 15.6 G/DL (ref 13.3–17.7)
IMM GRANULOCYTES # BLD: 0 10E3/UL
IMM GRANULOCYTES NFR BLD: 0 %
LYMPHOCYTES # BLD AUTO: 1.8 10E3/UL (ref 0.8–5.3)
LYMPHOCYTES NFR BLD AUTO: 16 %
MAGNESIUM SERPL-MCNC: 1.8 MG/DL (ref 1.7–2.3)
MCH RBC QN AUTO: 31.2 PG (ref 26.5–33)
MCHC RBC AUTO-ENTMCNC: 34.7 G/DL (ref 31.5–36.5)
MCV RBC AUTO: 90 FL (ref 78–100)
MONOCYTES # BLD AUTO: 0.9 10E3/UL (ref 0–1.3)
MONOCYTES NFR BLD AUTO: 8 %
NEUTROPHILS # BLD AUTO: 8.5 10E3/UL (ref 1.6–8.3)
NEUTROPHILS NFR BLD AUTO: 74 %
NRBC # BLD AUTO: 0 10E3/UL
NRBC BLD AUTO-RTO: 0 /100
PLATELET # BLD AUTO: 270 10E3/UL (ref 150–450)
POTASSIUM SERPL-SCNC: 3.8 MMOL/L (ref 3.4–5.3)
RBC # BLD AUTO: 5 10E6/UL (ref 4.4–5.9)
SODIUM SERPL-SCNC: 138 MMOL/L (ref 135–145)
WBC # BLD AUTO: 11.4 10E3/UL (ref 4–11)

## 2024-06-12 PROCEDURE — 93005 ELECTROCARDIOGRAM TRACING: CPT

## 2024-06-12 PROCEDURE — 85025 COMPLETE CBC W/AUTO DIFF WBC: CPT | Performed by: STUDENT IN AN ORGANIZED HEALTH CARE EDUCATION/TRAINING PROGRAM

## 2024-06-12 PROCEDURE — 83735 ASSAY OF MAGNESIUM: CPT | Performed by: PHYSICIAN ASSISTANT

## 2024-06-12 PROCEDURE — 99284 EMERGENCY DEPT VISIT MOD MDM: CPT

## 2024-06-12 PROCEDURE — 82374 ASSAY BLOOD CARBON DIOXIDE: CPT | Performed by: PHYSICIAN ASSISTANT

## 2024-06-12 PROCEDURE — 36415 COLL VENOUS BLD VENIPUNCTURE: CPT | Performed by: STUDENT IN AN ORGANIZED HEALTH CARE EDUCATION/TRAINING PROGRAM

## 2024-06-12 PROCEDURE — 85025 COMPLETE CBC W/AUTO DIFF WBC: CPT | Performed by: PHYSICIAN ASSISTANT

## 2024-06-12 PROCEDURE — 93010 ELECTROCARDIOGRAM REPORT: CPT | Performed by: INTERNAL MEDICINE

## 2024-06-12 PROCEDURE — 99284 EMERGENCY DEPT VISIT MOD MDM: CPT | Performed by: PHYSICIAN ASSISTANT

## 2024-06-12 ASSESSMENT — ENCOUNTER SYMPTOMS
COUGH: 0
APPETITE CHANGE: 0
SHORTNESS OF BREATH: 0
ACTIVITY CHANGE: 0
ABDOMINAL PAIN: 0
FEVER: 0

## 2024-06-12 ASSESSMENT — COLUMBIA-SUICIDE SEVERITY RATING SCALE - C-SSRS
6. HAVE YOU EVER DONE ANYTHING, STARTED TO DO ANYTHING, OR PREPARED TO DO ANYTHING TO END YOUR LIFE?: NO
2. HAVE YOU ACTUALLY HAD ANY THOUGHTS OF KILLING YOURSELF IN THE PAST MONTH?: NO
1. IN THE PAST MONTH, HAVE YOU WISHED YOU WERE DEAD OR WISHED YOU COULD GO TO SLEEP AND NOT WAKE UP?: NO

## 2024-06-12 ASSESSMENT — ACTIVITIES OF DAILY LIVING (ADL): ADLS_ACUITY_SCORE: 35

## 2024-06-12 NOTE — ED TRIAGE NOTES
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 States that his arms started to tingle and the back of his neck and he felt like he was going to pass out.   States that tingling is gone and he is not feeling like his is going to pass out any more but still does not feel well.   States that on Monday a metal pole hit his head and he fell to the ground. He did go to [facility] but everything checked out fine. The swelling is gone and he does not have any headache.      Triage Assessment (Adult)       Row Name 06/12/24 1402          Triage Assessment    Airway WDL WDL        Respiratory WDL    Respiratory WDL WDL        Skin Circulation/Temperature WDL    Skin Circulation/Temperature WDL WDL        Cardiac WDL    Cardiac WDL WDL        Peripheral/Neurovascular WDL    Peripheral Neurovascular WDL WDL        Cognitive/Neuro/Behavioral WDL    Cognitive/Neuro/Behavioral WDL WDL

## 2024-06-12 NOTE — DISCHARGE INSTRUCTIONS
Please follow-up with Dr. Salas.    Please return here for any return of symptoms, new symptoms, or other concerns.

## 2024-06-12 NOTE — ED NOTES
Patient presents ambulatory with no complaints currently, but states approx 1h ago he was pressure washing when he became dizzy, and arms and neck became tingling. States he hands would intermittently go numb. Patient states he feels baseline at this time and has had no sx since arrival in ED.

## 2024-06-12 NOTE — ED PROVIDER NOTES
History     Chief Complaint   Patient presents with    Tingling    Syncope     The history is provided by the patient.     Aaron Conner is a 28 year old male who presented to the emergency department ambulatory for evaluation of an episode of arm and hand numbness and tingling as well as near syncope.  The patient reports that he was working and he was pressure washing a basement when approximate 10 minutes prior to quitting he began to experience the above symptoms.  Symptoms virtually resolved minutes later.  Currently feels asymptomatic and at his baseline.  No recent travel.  No fevers.  No shortness of breath.  No cough.  No rashes.  No chest pain.    Allergies:  No Known Allergies    Problem List:    Patient Active Problem List    Diagnosis Date Noted    Calculus of kidney 10/15/2019     Priority: Medium        Past Medical History:    Past Medical History:   Diagnosis Date    Allergic rhinitis, cause unspecified 12/10/2010    Anxiety 3/28/2011    Cephalgia 5/14/2012    Chronic tonsillitis 5/14/2012    Peripheral vertigo, unspecified 3/14/2011       Past Surgical History:    Past Surgical History:   Procedure Laterality Date    APPENDECTOMY      appendicitis    foreign  body removed from right long finger  1994    maxillary antrostomy  5/2011    LT       Family History:    Family History   Problem Relation Age of Onset    Cancer Mother         ovarian    Eye Disorder Mother     Cancer Maternal Grandfather         lung       Social History:  Marital Status:  Single [1]  Social History     Tobacco Use    Smoking status: Former     Current packs/day: 1.00     Types: Cigarettes    Smokeless tobacco: Current     Types: Chew   Vaping Use    Vaping status: Some Days    Substances: Nicotine   Substance Use Topics    Alcohol use: Yes     Comment: social    Drug use: Yes     Types: Marijuana     Comment: Daily        Medications:    ondansetron (ZOFRAN ODT) 4 MG ODT tab          Review of Systems   Constitutional:   "Negative for activity change, appetite change and fever.   Respiratory:  Negative for cough and shortness of breath.    Cardiovascular:  Negative for chest pain.   Gastrointestinal:  Negative for abdominal pain.   Genitourinary: Negative.    Neurological:         See HPI.       Physical Exam   BP: 143/80  Pulse: 88  Temp: 97.6  F (36.4  C)  Resp: 16  Height: 165.1 cm (5' 5\")  Weight: 66.9 kg (147 lb 7.8 oz)  SpO2: 97 %      Physical Exam  Vitals and nursing note reviewed.   Constitutional:       Appearance: Normal appearance. He is normal weight.      Comments: Pleasant and talkative 28-year-old male found semireclined in no distress   HENT:      Head: Normocephalic and atraumatic.      Right Ear: Tympanic membrane, ear canal and external ear normal.      Left Ear: Tympanic membrane, ear canal and external ear normal.      Mouth/Throat:      Mouth: Mucous membranes are moist.      Pharynx: Oropharynx is clear. No oropharyngeal exudate.   Eyes:      Extraocular Movements: Extraocular movements intact.      Conjunctiva/sclera: Conjunctivae normal.      Pupils: Pupils are equal, round, and reactive to light.   Cardiovascular:      Rate and Rhythm: Normal rate and regular rhythm.   Pulmonary:      Effort: Pulmonary effort is normal.      Breath sounds: Normal breath sounds.   Abdominal:      Palpations: Abdomen is soft.   Musculoskeletal:      Right lower leg: No edema.      Left lower leg: No edema.   Skin:     General: Skin is warm and dry.      Capillary Refill: Capillary refill takes less than 2 seconds.   Neurological:      General: No focal deficit present.      Mental Status: He is alert and oriented to person, place, and time.      Comments: Cranial nerve examination: revealed that for cranial nerve   II: the pupils were reactive and the visual field were full  III, IV, and VI, the extraocular movements were full.    V: facial sensation intact bilateral   VII: facial movements are symmetric  VIII: hearing intact " to voice  IX & X: the soft palate rises symmetrically   XI: shoulder movements are symmetric  XII: tongue is midline     Neurological examination:  That the patient was awake and alert, the attention, orientation, concentration, language, memory and fund of knowledge were all normal.  The patient had no neglect or apraxia.     Normal speech  Normal gait   Psychiatric:         Mood and Affect: Mood normal.         ED Course     ED Course as of 06/12/24 1555   Wed Jun 12, 2024   1448 My independent review of the EKG shows a sinus rhythm with a ventricular rate of 76.  MO interval is shortened at 108 ms.  QRS duration is normal.  QTc is normal.  Axis is normal.  No concerning ST segments.     Procedures              Critical Care time:  none               Results for orders placed or performed during the hospital encounter of 06/12/24 (from the past 24 hour(s))   CBC with Platelets & Differential    Narrative    The following orders were created for panel order CBC with Platelets & Differential.  Procedure                               Abnormality         Status                     ---------                               -----------         ------                     CBC with platelets and d...[[id number]]  Abnormal            Final result                 Please view results for these tests on the individual orders.   Basic metabolic panel   Result Value Ref Range    Sodium 138 135 - 145 mmol/L    Potassium 3.8 3.4 - 5.3 mmol/L    Chloride 99 98 - 107 mmol/L    Carbon Dioxide (CO2) 27 22 - 29 mmol/L    Anion Gap 12 7 - 15 mmol/L    Urea Nitrogen 10.0 6.0 - 20.0 mg/dL    Creatinine 1.09 0.67 - 1.17 mg/dL    GFR Estimate >90 >60 mL/min/1.73m2    Calcium 9.5 8.6 - 10.0 mg/dL    Glucose 121 (H) 70 - 99 mg/dL   CBC with platelets and differential   Result Value Ref Range    WBC Count 11.4 (H) 4.0 - 11.0 10e3/uL    RBC Count 5.00 4.40 - 5.90 10e6/uL    Hemoglobin 15.6 13.3 - 17.7 g/dL    Hematocrit 44.9 40.0 - 53.0 %    MCV  90 78 - 100 fL    MCH 31.2 26.5 - 33.0 pg    MCHC 34.7 31.5 - 36.5 g/dL    RDW 12.4 10.0 - 15.0 %    Platelet Count 270 150 - 450 10e3/uL    % Neutrophils 74 %    % Lymphocytes 16 %    % Monocytes 8 %    % Eosinophils 1 %    % Basophils 0 %    % Immature Granulocytes 0 %    NRBCs per 100 WBC 0 <1 /100    Absolute Neutrophils 8.5 (H) 1.6 - 8.3 10e3/uL    Absolute Lymphocytes 1.8 0.8 - 5.3 10e3/uL    Absolute Monocytes 0.9 0.0 - 1.3 10e3/uL    Absolute Eosinophils 0.1 0.0 - 0.7 10e3/uL    Absolute Basophils 0.1 0.0 - 0.2 10e3/uL    Absolute Immature Granulocytes 0.0 <=0.4 10e3/uL    Absolute NRBCs 0.0 10e3/uL   Magnesium   Result Value Ref Range    Magnesium 1.8 1.7 - 2.3 mg/dL       Medications - No data to display    Assessments & Plan (with Medical Decision Making)   This is a 28-year-old male who presented to the emergency department after an episode of arm numbness and tingling as well as dizziness and near syncope.  Happened during a  of the basement.  During my interview and on his presentation to the emergency department he was asymptomatic.  He reported feeling at his baseline.  He had no high risk or red flag features.  His EKG showed a mild shortening of his TX interval but otherwise is unremarkable.  His laboratory evaluation was also unremarkable.  There is no reasonable indication to check cardiac labs.  He requested discharge on several occasions.  Certainly reasonable.  Close clinic follow-up was stressed.  Strict return precautions were provided.  He does not appear to be suffering from an emergent cardiac or intracranial catastrophe.    Mr. Conner has also agreed to schedule a follow up appointment with his primary provider for re-evaluation and further management. He verbalized an understanding of the results of our workup and agrees with the complete discharge plan including instructions for return and follow up.  There is no indication for further investigation or treatment on an  emergent basis.  There is no reasonably foreseeable injury that would be associated with discharge and close outpatient follow-up.      This document was prepared using a combination of typing and voice generated software.  While every attempt was made for accuracy, spelling and grammatical errors may exist.     I have reviewed the nursing notes.    I have reviewed the findings, diagnosis, plan and need for follow up with the patient.           Medical Decision Making  The patient's presentation was of moderate complexity (an undiagnosed new problem with uncertain prognosis).    The patient's evaluation involved:  ordering and/or review of 3+ test(s) in this encounter (multiple labs and EKG)    The patient's management necessitated only low risk treatment.        New Prescriptions    No medications on file       Final diagnoses:   Tingling sensation       6/12/2024   [facility]       Memo Greer PA-C  06/12/24 1872

## 2024-06-15 LAB
ATRIAL RATE - MUSE: 76 BPM
DIASTOLIC BLOOD PRESSURE - MUSE: NORMAL MMHG
INTERPRETATION ECG - MUSE: NORMAL
P AXIS - MUSE: -8 DEGREES
PR INTERVAL - MUSE: 108 MS
QRS DURATION - MUSE: 90 MS
QT - MUSE: 352 MS
QTC - MUSE: 396 MS
R AXIS - MUSE: 34 DEGREES
SYSTOLIC BLOOD PRESSURE - MUSE: NORMAL MMHG
T AXIS - MUSE: 43 DEGREES
VENTRICULAR RATE- MUSE: 76 BPM

## 2024-09-28 ENCOUNTER — HOSPITAL ENCOUNTER (EMERGENCY)
Facility: HOSPITAL | Age: 29
Discharge: HOME | End: 2024-09-28
Attending: STUDENT IN AN ORGANIZED HEALTH CARE EDUCATION/TRAINING PROGRAM
Payer: COMMERCIAL

## 2024-09-28 VITALS
SYSTOLIC BLOOD PRESSURE: 108 MMHG | DIASTOLIC BLOOD PRESSURE: 50 MMHG | TEMPERATURE: 98.3 F | OXYGEN SATURATION: 96 % | HEART RATE: 112 BPM | RESPIRATION RATE: 16 BRPM

## 2024-09-28 DIAGNOSIS — R11.2 NAUSEA AND VOMITING, UNSPECIFIED VOMITING TYPE: ICD-10-CM

## 2024-09-28 DIAGNOSIS — K29.01 ACUTE GASTRITIS WITH HEMORRHAGE, UNSPECIFIED GASTRITIS TYPE: ICD-10-CM

## 2024-09-28 LAB
ALBUMIN SERPL BCG-MCNC: 5.1 G/DL (ref 3.5–5.2)
ALP SERPL-CCNC: 64 U/L (ref 40–150)
ALT SERPL W P-5'-P-CCNC: 19 U/L (ref 0–70)
ANION GAP SERPL CALCULATED.3IONS-SCNC: 17 MMOL/L (ref 7–15)
AST SERPL W P-5'-P-CCNC: 26 U/L (ref 0–45)
BASOPHILS # BLD AUTO: 0.1 10E3/UL (ref 0–0.2)
BASOPHILS NFR BLD AUTO: 0 %
BILIRUB SERPL-MCNC: 0.7 MG/DL
BUN SERPL-MCNC: 12.1 MG/DL (ref 6–20)
CALCIUM SERPL-MCNC: 9.6 MG/DL (ref 8.8–10.4)
CHLORIDE SERPL-SCNC: 101 MMOL/L (ref 98–107)
CREAT SERPL-MCNC: 1.03 MG/DL (ref 0.67–1.17)
EGFRCR SERPLBLD CKD-EPI 2021: >90 ML/MIN/1.73M2
EOSINOPHIL # BLD AUTO: 0 10E3/UL (ref 0–0.7)
EOSINOPHIL NFR BLD AUTO: 0 %
ERYTHROCYTE [DISTWIDTH] IN BLOOD BY AUTOMATED COUNT: 12 % (ref 10–15)
GLUCOSE SERPL-MCNC: 94 MG/DL (ref 70–99)
HCO3 SERPL-SCNC: 23 MMOL/L (ref 22–29)
HCT VFR BLD AUTO: 46.1 % (ref 40–53)
HGB BLD-MCNC: 16.1 G/DL (ref 13.3–17.7)
HOLD SPECIMEN: NORMAL
IMM GRANULOCYTES # BLD: 0.1 10E3/UL
IMM GRANULOCYTES NFR BLD: 0 %
LIPASE SERPL-CCNC: 13 U/L (ref 13–60)
LYMPHOCYTES # BLD AUTO: 1.1 10E3/UL (ref 0.8–5.3)
LYMPHOCYTES NFR BLD AUTO: 8 %
MCH RBC QN AUTO: 31.4 PG (ref 26.5–33)
MCHC RBC AUTO-ENTMCNC: 34.9 G/DL (ref 31.5–36.5)
MCV RBC AUTO: 90 FL (ref 78–100)
MONOCYTES # BLD AUTO: 0.6 10E3/UL (ref 0–1.3)
MONOCYTES NFR BLD AUTO: 4 %
NEUTROPHILS # BLD AUTO: 13.1 10E3/UL (ref 1.6–8.3)
NEUTROPHILS NFR BLD AUTO: 88 %
NRBC # BLD AUTO: 0 10E3/UL
NRBC BLD AUTO-RTO: 0 /100
PLATELET # BLD AUTO: 305 10E3/UL (ref 150–450)
POTASSIUM SERPL-SCNC: 4.1 MMOL/L (ref 3.4–5.3)
PROT SERPL-MCNC: 7.6 G/DL (ref 6.4–8.3)
RBC # BLD AUTO: 5.12 10E6/UL (ref 4.4–5.9)
SODIUM SERPL-SCNC: 141 MMOL/L (ref 135–145)
WBC # BLD AUTO: 15 10E3/UL (ref 4–11)

## 2024-09-28 PROCEDURE — 96375 TX/PRO/DX INJ NEW DRUG ADDON: CPT | Performed by: STUDENT IN AN ORGANIZED HEALTH CARE EDUCATION/TRAINING PROGRAM

## 2024-09-28 PROCEDURE — 250N000009 HC RX 250: Performed by: STUDENT IN AN ORGANIZED HEALTH CARE EDUCATION/TRAINING PROGRAM

## 2024-09-28 PROCEDURE — 250N000013 HC RX MED GY IP 250 OP 250 PS 637: Performed by: STUDENT IN AN ORGANIZED HEALTH CARE EDUCATION/TRAINING PROGRAM

## 2024-09-28 PROCEDURE — 99284 EMERGENCY DEPT VISIT MOD MDM: CPT | Mod: 25 | Performed by: STUDENT IN AN ORGANIZED HEALTH CARE EDUCATION/TRAINING PROGRAM

## 2024-09-28 PROCEDURE — 96374 THER/PROPH/DIAG INJ IV PUSH: CPT | Performed by: STUDENT IN AN ORGANIZED HEALTH CARE EDUCATION/TRAINING PROGRAM

## 2024-09-28 PROCEDURE — 96361 HYDRATE IV INFUSION ADD-ON: CPT | Performed by: STUDENT IN AN ORGANIZED HEALTH CARE EDUCATION/TRAINING PROGRAM

## 2024-09-28 PROCEDURE — 250N000011 HC RX IP 250 OP 636: Performed by: STUDENT IN AN ORGANIZED HEALTH CARE EDUCATION/TRAINING PROGRAM

## 2024-09-28 PROCEDURE — 85025 COMPLETE CBC W/AUTO DIFF WBC: CPT | Performed by: STUDENT IN AN ORGANIZED HEALTH CARE EDUCATION/TRAINING PROGRAM

## 2024-09-28 PROCEDURE — 83690 ASSAY OF LIPASE: CPT | Performed by: STUDENT IN AN ORGANIZED HEALTH CARE EDUCATION/TRAINING PROGRAM

## 2024-09-28 PROCEDURE — 80053 COMPREHEN METABOLIC PANEL: CPT | Performed by: STUDENT IN AN ORGANIZED HEALTH CARE EDUCATION/TRAINING PROGRAM

## 2024-09-28 PROCEDURE — 99284 EMERGENCY DEPT VISIT MOD MDM: CPT | Performed by: STUDENT IN AN ORGANIZED HEALTH CARE EDUCATION/TRAINING PROGRAM

## 2024-09-28 PROCEDURE — 258N000003 HC RX IP 258 OP 636: Performed by: STUDENT IN AN ORGANIZED HEALTH CARE EDUCATION/TRAINING PROGRAM

## 2024-09-28 PROCEDURE — 36415 COLL VENOUS BLD VENIPUNCTURE: CPT | Performed by: STUDENT IN AN ORGANIZED HEALTH CARE EDUCATION/TRAINING PROGRAM

## 2024-09-28 RX ORDER — ONDANSETRON 2 MG/ML
4 INJECTION INTRAMUSCULAR; INTRAVENOUS ONCE
Status: COMPLETED | OUTPATIENT
Start: 2024-09-28 | End: 2024-09-28

## 2024-09-28 RX ORDER — MAGNESIUM HYDROXIDE/ALUMINUM HYDROXICE/SIMETHICONE 120; 1200; 1200 MG/30ML; MG/30ML; MG/30ML
15 SUSPENSION ORAL ONCE
Status: COMPLETED | OUTPATIENT
Start: 2024-09-28 | End: 2024-09-28

## 2024-09-28 RX ADMIN — SODIUM CHLORIDE 1000 ML: 9 INJECTION, SOLUTION INTRAVENOUS at 17:50

## 2024-09-28 RX ADMIN — ALUMINUM HYDROXIDE, MAGNESIUM HYDROXIDE, AND DIMETHICONE 15 ML: 200; 20; 200 SUSPENSION ORAL at 17:49

## 2024-09-28 RX ADMIN — ONDANSETRON 4 MG: 2 INJECTION INTRAMUSCULAR; INTRAVENOUS at 17:49

## 2024-09-28 RX ADMIN — FAMOTIDINE 20 MG: 10 INJECTION, SOLUTION INTRAVENOUS at 17:50

## 2024-09-28 RX ADMIN — PANTOPRAZOLE SODIUM 40 MG: 40 INJECTION, POWDER, FOR SOLUTION INTRAVENOUS at 17:50

## 2024-09-28 ASSESSMENT — ACTIVITIES OF DAILY LIVING (ADL)
ADLS_ACUITY_SCORE: 35
ADLS_ACUITY_SCORE: 35

## 2024-09-28 ASSESSMENT — COLUMBIA-SUICIDE SEVERITY RATING SCALE - C-SSRS
1. IN THE PAST MONTH, HAVE YOU WISHED YOU WERE DEAD OR WISHED YOU COULD GO TO SLEEP AND NOT WAKE UP?: NO
6. HAVE YOU EVER DONE ANYTHING, STARTED TO DO ANYTHING, OR PREPARED TO DO ANYTHING TO END YOUR LIFE?: NO
2. HAVE YOU ACTUALLY HAD ANY THOUGHTS OF KILLING YOURSELF IN THE PAST MONTH?: NO

## 2024-09-28 NOTE — ED PROVIDER NOTES
ED PROVIDER NOTE  Patient Name: Aaron Conner  MRN: [id number]    CC:    Chief Complaint   Patient presents with    Abdominal Pain    Vomiting         MDM  28 year old male presenting with abdominal pain and vomiting.    In the ED, /78   Pulse 100   Temp 98.3  F (36.8  C) (Tympanic)   Resp 16   SpO2 96%     Physical exam revealed clear auscultation bilaterally.  Mild epigastric abdominal tenderness.  Grossly neurologically intact.  In no acute distress, no accessory muscle use.  Overall does not look critically ill or toxic.      I have independently reviewed and interpreted the patients test results which I have ordered this visit which are the following:  Labs were remarkable for white blood cell count 15.0.  Hemoglobin 16.1.  Sodium 141.  Potassium 4.4.  Creatinine 1.03.  AST 26.  ALT 19.  Lipase 13.    Patient was given Protonix 40 mg IV, Pepcid 20 mg IV, Maalox, Zofran, 1 L fluids.      Patient had significant proved in the symptoms.  We did discuss alcohol cessation and smoking cessation as these may increase the risk of ulcers and bleeding.  His hemoglobin is reassuring, and thus I do not believe there is any indication at this moment for severe significant GI bleed.  He does have a history of alcohol use in the past, unknown where there he has varices but given his platelet count of 305 I think this suggest against that.  in addition his hepatic enzymes are reassuring.  I recommended trial of Pepcid 2 times a day for the next 2 to 4 weeks.  We did discuss the possibility of needing EGD if his symptoms do not prove or testing for H. pylori.    Plan  - start pepcid x2-4 weeks  - The patient was advised to follow up with PCP in 2-3 days and to return to the ED if symptoms worsened, or if there were any other urgent or life-threatening concerns.  - Following counseling on the work-up, results, diagnosis, and treatment plan, the patient was amenable to discharge after all questions were answered. The  patient expressed agreement and understanding to the plan.      Clinical impression  Abdominal pain  Vomiting    Disposition  Home      HPI    Aaron Conner is a 28 year old male with PMH of ureterolithiasis who presents with epigastric abdominal pain.     History of obtained by: Patient    Patient states that over the last 3 years he has significantly decreased his alcohol use to only 2 times per week.  The amount of alcohol is not known.  However he states his significant decrease, he developed epigastric abdominal pain nausea general malaise and vomiting.  He has had speckles of blood and occasional small clots in his vomiting this morning.  He did drink quite a significant amount last night again did not say exactly how much.  He vomited approximately 5-6 times.  He continues to have some epigastric abdominal pain, and nausea.    PMH and SH reviewed.    10 point ROS reviewed and negative except as stated in HPI.    Past medical history:  Past Medical History:   Diagnosis Date    Allergic rhinitis, cause unspecified 12/10/2010    Anxiety 3/28/2011    Cephalgia 5/14/2012    Chronic tonsillitis 5/14/2012    Peripheral vertigo, unspecified 3/14/2011       Social history:  Social Connections: Not on file     Social History     Socioeconomic History    Marital status: Single   Tobacco Use    Smoking status: Former     Current packs/day: 1.00     Types: Cigarettes    Smokeless tobacco: Current     Types: Chew   Vaping Use    Vaping status: Some Days    Substances: Nicotine   Substance and Sexual Activity    Alcohol use: Yes     Comment: social    Drug use: Yes     Types: Marijuana     Comment: Daily    Sexual activity: Yes     Partners: Female     Birth control/protection: None   Other Topics Concern    Caffeine Concern Yes     Comment: soda 2 cups daily    Seat Belt Yes         I have reviewed the patients prescribed medications:    Current Facility-Administered Medications:     alum & mag hydroxide-simethicone  (MAALOX) suspension 15 mL, 15 mL, Oral, Once, Samuel Canales MD    famotidine (PEPCID) injection 20 mg, 20 mg, Intravenous, Q12H, Samuel Canales MD    ondansetron (ZOFRAN) injection 4 mg, 4 mg, Intravenous, Once, Samuel Canales MD    pantoprazole (PROTONIX) IV push injection 40 mg, 40 mg, Intravenous, Once, Samuel Canales MD    sodium chloride 0.9% BOLUS 1,000 mL, 1,000 mL, Intravenous, Once, Samuel Canales MD    Current Outpatient Medications:     ondansetron (ZOFRAN ODT) 4 MG ODT tab, Take 1 tablet (4 mg) by mouth every 6 hours as needed for nausea or vomiting (Patient not taking: Reported on 9/28/2024), Disp: 20 tablet, Rfl: 0    Allergies:  No Known Allergies      Vitals:    09/28/24 1720   BP: 123/78   Pulse: 100   Resp: 16   Temp: 98.3  F (36.8  C)   TempSrc: Tympanic   SpO2: 96%       Physical Exam  Vitals: /78   Pulse 100   Temp 98.3  F (36.8  C) (Tympanic)   Resp 16   SpO2 96%   Constitutional: awake, NAD  Eyes: No conjunctival injection and normal lids, PERRL, EOMI  ENT: external nose and ears atraumatic  Neck: Symmetric, trachea midline, Supple  CV: RRR, no murmurs appreciated.  Pulm: Unlabored respiratory effort, good air movement, CTAB, no w/c/r appreciated.  GI: Soft, Mild epigastric abdominal tenderness. , nondistended.  No rebound or guarding  MSK: No deformities.  No cyanosis.  Neuro: AOx4, normal speech, following commands, grossly symmetric strength in all extremities.  Cranial nerves III-XII grossly intact.   Skin: warm & dry, no rashes or lesions  Psych: Appropriate mood & affect    Past medical history, past surgical history, problem list, family history, social history, medication list, and allergies were reviewed as documented in epic snapshot.  Relevant review of systems are documented within the HPI above.    Complexity of the Problems Addressed  5 (High) - 1 or more chronic illnesses with severe exacerbation, progression, or side effects of treatment or 1 acute chronic illness or  injury that poses threat to live or bodily function    Complexity of Data  I have reviewed the following pertinent historical labs, diagnoses, tests, and/or imaging which contribute to complexity of this patient's care. 4 - (Moderate) - (1 of three) I have reviewed at least 3 of the 4; external notes, review results of unique test, ordering of unique test, assessment requiring independent history. OR independent     Complication Risk  Based on my interpretation of the current labs, historical tests and/or external tests. Patient does have a risk level as stated below of morbidity, threat to life, and bodily function, and severe exacerbation if not treated.   4 - Moderate ( Rx drug management, significant limitation of treatment options 2/2 social determinants of health, decision regarding elective major surgery without risk, decision regarding minor surgery with identified patient risk).  -Rx drug management such as giving new Rx in the ED, new Rx prescibed for home use, modification of Rx Drugs, review of home meds and considering dose adjustment but not dose adjustment made.  - major/ minor surgery discussions (regarding fracture reduction, joint relocation, chest tube, cardioversion, intubation)    ED Events, Labs and Imaging:       Scott Canales MD  Emergency Medicine    Dictation Disclaimer: Some notes are completed with voice-recognition dictation software. Errors are generally corrected in real time. Please contact me via Epic staff message if you note any errors requiring clarification.     Samuel Canales MD  09/28/24 7771

## 2024-09-28 NOTE — DISCHARGE INSTRUCTIONS
Follow-up with your primary care doctor the next 2 to 3 days.    Stay away from NSAIDs such as ibuprofen, Motrin, naproxen, aspirin, advil, etc.    Take Pepcid 2 times a day for the next 2 to 4 weeks.        For mild to moderate pain or fever you can take Tylenol if you do not have allergies to these.    You have any worsening or concerning symptoms please call 911 or return to the emergency department immediately.

## 2024-09-28 NOTE — ED TRIAGE NOTES
Pt reports having burning, constant pain to his epigastric area. Pt states that he was drinking last night, and then this morning he was fine, then his stomach started hurting. Pt then began to vomit and there was blood with clots in it. Pt did this 5 times or so. Pt states he is not a daily drinker but when he does drink its heavily. Pt states he is dizzy.

## 2024-12-08 ENCOUNTER — HOSPITAL ENCOUNTER (EMERGENCY)
Facility: HOSPITAL | Age: 29
Discharge: HOME | End: 2024-12-08
Attending: EMERGENCY MEDICINE
Payer: COMMERCIAL

## 2024-12-08 VITALS
RESPIRATION RATE: 18 BRPM | TEMPERATURE: 98.1 F | DIASTOLIC BLOOD PRESSURE: 86 MMHG | HEART RATE: 95 BPM | OXYGEN SATURATION: 97 % | SYSTOLIC BLOOD PRESSURE: 131 MMHG

## 2024-12-08 DIAGNOSIS — S05.01XA ABRASION OF RIGHT CORNEA, INITIAL ENCOUNTER: ICD-10-CM

## 2024-12-08 PROCEDURE — 99283 EMERGENCY DEPT VISIT LOW MDM: CPT

## 2024-12-08 PROCEDURE — 250N000009 HC RX 250: Performed by: EMERGENCY MEDICINE

## 2024-12-08 PROCEDURE — 99283 EMERGENCY DEPT VISIT LOW MDM: CPT | Performed by: EMERGENCY MEDICINE

## 2024-12-08 RX ORDER — POLYMYXIN B SULFATE AND TRIMETHOPRIM 1; 10000 MG/ML; [USP'U]/ML
1-2 SOLUTION OPHTHALMIC
Qty: 10 ML | Refills: 0 | Status: SHIPPED | OUTPATIENT
Start: 2024-12-08

## 2024-12-08 RX ORDER — TETRACAINE HYDROCHLORIDE 5 MG/ML
1-2 SOLUTION OPHTHALMIC ONCE
Status: COMPLETED | OUTPATIENT
Start: 2024-12-08 | End: 2024-12-08

## 2024-12-08 RX ADMIN — TETRACAINE HYDROCHLORIDE 2 DROP: 5 SOLUTION OPHTHALMIC at 16:19

## 2024-12-08 RX ADMIN — FLUORESCEIN SODIUM 1 STRIP: 1 STRIP OPHTHALMIC at 16:19

## 2024-12-08 NOTE — ED PROVIDER NOTES
EMERGENCY DEPARTMENT ENCOUNTER      NAME: Aaron Conner  AGE: 28 year old male  YOB: 1995  MRN: [id number]  EVALUATION DATE & TIME: 2024  4:13 PM    PCP: Osmel Salas    ED PROVIDER: Venancio Syed M.D.      Chief Complaint   Patient presents with    Eye Pain         FINAL IMPRESSION:  1. Abrasion of right cornea, initial encounter          ED COURSE & MEDICAL DECISION MAKIN year old male presents to the Emergency Department for evaluation of right eye pain.  A dog scratched the patient's right eye last night.  On exam he has a medium sized corneal abrasion just superior to the right pupil.  Subjective vision is normal.  No signs of deeper eye injury on exam.  Pain almost completely alleviated by tetracaine.  We discussed prophylactic Polytrim eyedrops and eye clinic follow-up.  Patient was discharged in stable condition.    At the conclusion of the encounter I discussed the results of all of the tests and the disposition. The questions were answered. The patient or family acknowledged understanding and was agreeable with the care plan.         MEDICATIONS GIVEN IN THE EMERGENCY:  Medications   fluorescein (FUL-BARRIE) ophthalmic strip 1 strip (1 strip Both Eyes $Given by Other 24)   tetracaine (PONTOCAINE) 0.5 % ophthalmic solution 1-2 drop (2 drops Both Eyes $Given 24)       NEW PRESCRIPTIONS STARTED AT TODAY'S ER VISIT  Discharge Medication List as of 2024  4:32 PM        START taking these medications    Details   polymixin b-trimethoprim (POLYTRIM) 16229-5.1 UNIT/ML-% ophthalmic solution Place 1-2 drops into the right eye every 4 hours (while awake)., Disp-10 mL, R-0, Local Print                =================================================================    HPI    Patient information was obtained from: Patient      Aaron Conner is a 28 year old male who presents to this ED today for evaluation of eye pain.  Patient presenting with right eye pain 1  day after his dog jumped up onto the bed and scratched his right eye.  He has had significant pain overnight.  Tried some  Visine drops with limited improvement.  When he is able to get his eye open, reports his vision is normal.  Reports photophobia.      REVIEW OF SYSTEMS   All systems reviewed and negative except as noted in HPI.    PAST MEDICAL HISTORY:  Past Medical History:   Diagnosis Date    Allergic rhinitis, cause unspecified 12/10/2010    Anxiety 3/28/2011    Cephalgia 5/14/2012    Chronic tonsillitis 5/14/2012    Peripheral vertigo, unspecified 3/14/2011       PAST SURGICAL HISTORY:  Past Surgical History:   Procedure Laterality Date    APPENDECTOMY      appendicitis    foreign  body removed from right long finger  1994    maxillary antrostomy  5/2011    LT           CURRENT MEDICATIONS:    No current facility-administered medications for this encounter.     Current Outpatient Medications   Medication Sig Dispense Refill    polymixin b-trimethoprim (POLYTRIM) 01394-4.1 UNIT/ML-% ophthalmic solution Place 1-2 drops into the right eye every 4 hours (while awake). 10 mL 0    ondansetron (ZOFRAN ODT) 4 MG ODT tab Take 1 tablet (4 mg) by mouth every 6 hours as needed for nausea or vomiting (Patient not taking: Reported on 9/28/2024) 20 tablet 0         ALLERGIES:  No Known Allergies    FAMILY HISTORY:  Family History   Problem Relation Age of Onset    Cancer Mother         ovarian    Eye Disorder Mother     Cancer Maternal Grandfather         lung       SOCIAL HISTORY:   Social History     Socioeconomic History    Marital status: Single   Tobacco Use    Smoking status: Former     Current packs/day: 1.00     Types: Cigarettes    Smokeless tobacco: Current     Types: Chew   Vaping Use    Vaping status: Some Days    Substances: Nicotine   Substance and Sexual Activity    Alcohol use: Yes     Comment: social    Drug use: Yes     Types: Marijuana     Comment: Daily    Sexual activity: Yes     Partners: Female      Birth control/protection: None   Other Topics Concern    Caffeine Concern Yes     Comment: soda 2 cups daily    Seat Belt Yes       VITALS:  /86   Pulse 95   Temp 98.1  F (36.7  C)   Resp 18   SpO2 97%     PHYSICAL EXAM    Constitutional: Well developed, Well nourished, NAD.  HENT: Right eye conjunctiva is injected.  There is a 2 mm abrasion to the superior cornea at the 12 o'clock position just superior to the pupil.  Pupils are equal and reactive bilaterally.  Extraocular movements are intact without pain.  Lids were everted with no foreign bodies identified.  Eyes: EOMI, Conjunctiva normal.  Respiratory: Breathing comfortably on room air. Speaks full sentences easily. Lungs clear to ascultation.  Cardiovascular: Normal heart rate, Regular rhythm. No peripheral edema.  Abdomen: Soft, nontender  Musculoskeletal: Good range of motion in all major joints. No major deformities noted.  Integument: Warm, Dry.  Neurologic: Alert & awake, Normal motor function, Normal sensory function, No focal deficits noted.   Psychiatric: Cooperative. Affect appropriate.         Venancio Syed M.D.  Emergency Medicine  [facility]  [address] [place] [id number] [phone]  Dept: [phone]       Venancio Syed MD  12/08/24 3715

## 2024-12-08 NOTE — ED TRIAGE NOTES
Patient presents w/ c/o scratching his right eye.   His dog jumped in bed early this morning and scratched his eye. No other complaints.      Triage Assessment (Adult)       Row Name 12/08/24 6512          Triage Assessment    Airway WDL WDL        Respiratory WDL    Respiratory WDL WDL;rhythm/pattern;expansion/retractions     Rhythm/Pattern, Respiratory unlabored;pattern regular;depth regular;no shortness of breath reported     Expansion/Accessory Muscles/Retractions no use of accessory muscles;no retractions;expansion symmetric

## 2024-12-08 NOTE — DISCHARGE INSTRUCTIONS
You were seen in the emergency department for a scratch of the outside of your right eye.  You have a corneal abrasion which is a small area of damage to the surface of the right eye just above the pupil.  The treatment is pain medications including anti-inflammatories as well as prophylactic eyedrops.  Please apply the eyedrops as prescribed.  we would suggest follow-up with ophthalmology especially if you have persistent significant pain over the next several days or have any vision concerns whatsoever.